# Patient Record
Sex: FEMALE | Race: WHITE | NOT HISPANIC OR LATINO | Employment: OTHER | URBAN - METROPOLITAN AREA
[De-identification: names, ages, dates, MRNs, and addresses within clinical notes are randomized per-mention and may not be internally consistent; named-entity substitution may affect disease eponyms.]

---

## 2023-03-07 DIAGNOSIS — F41.9 ANXIETY: Primary | ICD-10-CM

## 2023-03-07 RX ORDER — ALPRAZOLAM 0.5 MG/1
0.5 TABLET, EXTENDED RELEASE ORAL DAILY
Qty: 90 TABLET | Refills: 0 | Status: SHIPPED | OUTPATIENT
Start: 2023-03-07 | End: 2023-05-30 | Stop reason: SDUPTHER

## 2023-03-07 RX ORDER — ALPRAZOLAM 0.5 MG/1
1 TABLET, EXTENDED RELEASE ORAL DAILY
COMMUNITY
Start: 2013-03-21 | End: 2023-03-07 | Stop reason: SDUPTHER

## 2023-03-15 DIAGNOSIS — I10 BENIGN ESSENTIAL HYPERTENSION: ICD-10-CM

## 2023-03-15 DIAGNOSIS — E78.00 HYPERCHOLESTEROLEMIA: ICD-10-CM

## 2023-03-16 PROBLEM — E66.01 MORBID OBESITY (MULTI): Status: ACTIVE | Noted: 2023-03-16

## 2023-03-16 PROBLEM — R09.82 POST-NASAL DRIP: Status: ACTIVE | Noted: 2023-03-16

## 2023-03-16 PROBLEM — R07.9 CHEST PAIN: Status: ACTIVE | Noted: 2023-03-16

## 2023-03-16 PROBLEM — J45.40 MODERATE PERSISTENT ASTHMA WITHOUT COMPLICATION (HHS-HCC): Status: ACTIVE | Noted: 2023-03-16

## 2023-03-16 PROBLEM — M17.10 ARTHRITIS OF KNEE: Status: ACTIVE | Noted: 2023-03-16

## 2023-03-16 PROBLEM — Z96.611 STATUS POST REPLACEMENT OF RIGHT SHOULDER JOINT: Status: ACTIVE | Noted: 2023-03-16

## 2023-03-16 PROBLEM — R13.10 DYSPHAGIA: Status: ACTIVE | Noted: 2023-03-16

## 2023-03-16 PROBLEM — R73.09 ABNORMAL GLUCOSE: Status: ACTIVE | Noted: 2023-03-16

## 2023-03-16 PROBLEM — J31.0 RHINITIS, CHRONIC: Status: ACTIVE | Noted: 2023-03-16

## 2023-03-16 PROBLEM — K21.9 GERD (GASTROESOPHAGEAL REFLUX DISEASE): Status: ACTIVE | Noted: 2023-03-16

## 2023-03-16 PROBLEM — R53.1 WEAKNESS GENERALIZED: Status: ACTIVE | Noted: 2023-03-16

## 2023-03-16 PROBLEM — M17.11 LOCALIZED PRIMARY OSTEOARTHRITIS OF LOWER LEG, RIGHT: Status: ACTIVE | Noted: 2023-03-16

## 2023-03-16 PROBLEM — M54.12 CERVICAL RADICULAR PAIN: Status: ACTIVE | Noted: 2023-03-16

## 2023-03-16 PROBLEM — N90.4 LICHEN SCLEROSUS OF FEMALE GENITALIA: Status: ACTIVE | Noted: 2023-03-16

## 2023-03-16 PROBLEM — R06.00 DYSPNEA: Status: ACTIVE | Noted: 2023-03-16

## 2023-03-16 PROBLEM — K58.0 IRRITABLE BOWEL SYNDROME WITH DIARRHEA: Status: ACTIVE | Noted: 2023-03-16

## 2023-03-16 PROBLEM — J38.3 VOCAL CORD DYSFUNCTION: Status: ACTIVE | Noted: 2023-03-16

## 2023-03-16 PROBLEM — R05.3 COUGH, PERSISTENT: Status: ACTIVE | Noted: 2023-03-16

## 2023-03-16 PROBLEM — S43.429A ROTATOR CUFF (CAPSULE) SPRAIN: Status: ACTIVE | Noted: 2023-03-16

## 2023-03-16 PROBLEM — L02.01 ABSCESS OF SUBMENTAL REGION: Status: ACTIVE | Noted: 2023-03-16

## 2023-03-16 PROBLEM — G56.00 SEVERE CARPAL TUNNEL SYNDROME: Status: ACTIVE | Noted: 2023-03-16

## 2023-03-16 PROBLEM — J06.9 ACUTE URI: Status: ACTIVE | Noted: 2023-03-16

## 2023-03-16 PROBLEM — M47.812 SPONDYLOSIS OF CERVICAL REGION WITHOUT MYELOPATHY OR RADICULOPATHY: Status: ACTIVE | Noted: 2023-03-16

## 2023-03-16 PROBLEM — J95.04 TRACHEOCUTANEOUS FISTULA FOLLOWING TRACHEOSTOMY (MULTI): Status: ACTIVE | Noted: 2023-03-16

## 2023-03-16 PROBLEM — M16.12 ARTHRITIS OF LEFT HIP: Status: ACTIVE | Noted: 2023-03-16

## 2023-03-16 PROBLEM — M25.519 SHOULDER PAIN: Status: ACTIVE | Noted: 2023-03-16

## 2023-03-16 PROBLEM — R05.8 UPPER AIRWAY COUGH SYNDROME: Status: ACTIVE | Noted: 2023-03-16

## 2023-03-16 PROBLEM — J45.909 ASTHMA (HHS-HCC): Status: ACTIVE | Noted: 2023-03-16

## 2023-03-16 PROBLEM — M48.061 SPINAL STENOSIS OF LUMBAR REGION: Status: ACTIVE | Noted: 2023-03-16

## 2023-03-16 PROBLEM — M79.7 FIBROMYALGIA: Status: ACTIVE | Noted: 2023-03-16

## 2023-03-16 PROBLEM — E78.00 HYPERCHOLESTEROLEMIA: Status: ACTIVE | Noted: 2023-03-16

## 2023-03-16 PROBLEM — M62.81 MUSCLE WEAKNESS (GENERALIZED): Status: ACTIVE | Noted: 2023-03-16

## 2023-03-16 PROBLEM — Z93.0 STATUS POST TRACHEOSTOMY (MULTI): Status: ACTIVE | Noted: 2023-03-16

## 2023-03-16 PROBLEM — N95.2 VAGINAL ATROPHY: Status: ACTIVE | Noted: 2023-03-16

## 2023-03-16 PROBLEM — M25.559 ARTHRALGIA OF HIP: Status: ACTIVE | Noted: 2023-03-16

## 2023-03-16 PROBLEM — E03.9 HYPOTHYROIDISM: Status: ACTIVE | Noted: 2023-03-16

## 2023-03-16 PROBLEM — T78.3XXA ANGIOEDEMA: Status: ACTIVE | Noted: 2023-03-16

## 2023-03-16 PROBLEM — M25.569 JOINT PAIN, KNEE: Status: ACTIVE | Noted: 2023-03-16

## 2023-03-16 PROBLEM — R19.7 DIARRHEA: Status: ACTIVE | Noted: 2023-03-16

## 2023-03-16 PROBLEM — K25.9: Status: ACTIVE | Noted: 2023-03-16

## 2023-03-16 PROBLEM — R45.89 DEPRESSED MOOD: Status: ACTIVE | Noted: 2023-03-16

## 2023-03-16 PROBLEM — N90.89 VULVAR LESION: Status: ACTIVE | Noted: 2023-03-16

## 2023-03-16 PROBLEM — R06.1 STRIDOR: Status: ACTIVE | Noted: 2023-03-16

## 2023-03-16 PROBLEM — F41.9 ANXIETY: Status: ACTIVE | Noted: 2023-03-16

## 2023-03-16 PROBLEM — B35.6 TINEA CRURIS: Status: ACTIVE | Noted: 2023-03-16

## 2023-03-16 PROBLEM — E73.9 LACTOSE INTOLERANCE: Status: ACTIVE | Noted: 2023-03-16

## 2023-03-16 PROBLEM — J32.9 CHRONIC SINUSITIS: Status: ACTIVE | Noted: 2023-03-16

## 2023-03-16 PROBLEM — M47.816 SPONDYLOSIS OF LUMBAR JOINT: Status: ACTIVE | Noted: 2023-03-16

## 2023-03-16 PROBLEM — M51.36 DISC DEGENERATION, LUMBAR: Status: ACTIVE | Noted: 2023-03-16

## 2023-03-16 PROBLEM — R49.8 VOCAL FATIGUE: Status: ACTIVE | Noted: 2023-03-16

## 2023-03-16 PROBLEM — F32.5 MAJOR DEPRESSIVE DISORDER IN FULL REMISSION (CMS-HCC): Status: ACTIVE | Noted: 2023-03-16

## 2023-03-16 PROBLEM — R20.2 HAND PARESTHESIA: Status: ACTIVE | Noted: 2023-03-16

## 2023-03-16 PROBLEM — J30.2 SEASONAL ALLERGIC RHINITIS: Status: ACTIVE | Noted: 2023-03-16

## 2023-03-16 PROBLEM — D12.6 ADENOMATOUS POLYP OF COLON: Status: ACTIVE | Noted: 2023-03-16

## 2023-03-16 PROBLEM — M51.369 DISC DEGENERATION, LUMBAR: Status: ACTIVE | Noted: 2023-03-16

## 2023-03-16 PROBLEM — M70.61 GREATER TROCHANTERIC BURSITIS OF RIGHT HIP: Status: ACTIVE | Noted: 2023-03-16

## 2023-03-16 PROBLEM — G47.33 OBSTRUCTIVE SLEEP APNEA: Status: ACTIVE | Noted: 2023-03-16

## 2023-03-16 PROBLEM — U07.1 COVID-19 VIRUS INFECTION: Status: ACTIVE | Noted: 2023-03-16

## 2023-03-16 PROBLEM — M19.019 SHOULDER ARTHRITIS: Status: ACTIVE | Noted: 2023-03-16

## 2023-03-16 PROBLEM — M48.00 SPINAL STENOSIS: Status: ACTIVE | Noted: 2023-03-16

## 2023-03-16 PROBLEM — M21.969 ANKLE AND FOOT DEFORMITY, ACQUIRED: Status: ACTIVE | Noted: 2023-03-16

## 2023-03-16 PROBLEM — I10 BENIGN ESSENTIAL HYPERTENSION: Status: ACTIVE | Noted: 2023-03-16

## 2023-03-16 PROBLEM — M16.11 OSTEOARTHRITIS OF RIGHT HIP: Status: ACTIVE | Noted: 2023-03-16

## 2023-03-16 PROBLEM — M54.2 NECK PAIN: Status: ACTIVE | Noted: 2023-03-16

## 2023-03-17 RX ORDER — LEVOTHYROXINE SODIUM 150 UG/1
TABLET ORAL
Qty: 90 TABLET | Refills: 3 | Status: SHIPPED | OUTPATIENT
Start: 2023-03-17 | End: 2023-05-30 | Stop reason: SDUPTHER

## 2023-03-17 RX ORDER — LOSARTAN POTASSIUM 100 MG/1
TABLET ORAL
Qty: 90 TABLET | Refills: 3 | Status: SHIPPED | OUTPATIENT
Start: 2023-03-17 | End: 2023-05-30 | Stop reason: SDUPTHER

## 2023-03-20 DIAGNOSIS — R45.89 DEPRESSED MOOD: ICD-10-CM

## 2023-03-20 DIAGNOSIS — I10 BENIGN ESSENTIAL HYPERTENSION: ICD-10-CM

## 2023-03-20 RX ORDER — CITALOPRAM 40 MG/1
TABLET, FILM COATED ORAL
COMMUNITY
Start: 2012-02-09 | End: 2023-03-20 | Stop reason: SDUPTHER

## 2023-03-20 RX ORDER — CITALOPRAM 40 MG/1
40 TABLET, FILM COATED ORAL
Qty: 90 TABLET | Refills: 3 | Status: SHIPPED | OUTPATIENT
Start: 2023-03-20 | End: 2024-02-05

## 2023-03-20 RX ORDER — FUROSEMIDE 20 MG/1
20 TABLET ORAL DAILY PRN
Qty: 90 TABLET | Refills: 3 | Status: SHIPPED | OUTPATIENT
Start: 2023-03-20 | End: 2023-05-30 | Stop reason: SDUPTHER

## 2023-03-20 RX ORDER — FUROSEMIDE 20 MG/1
1 TABLET ORAL DAILY PRN
COMMUNITY
Start: 2014-12-15 | End: 2023-03-20 | Stop reason: SDUPTHER

## 2023-05-30 ENCOUNTER — OFFICE VISIT (OUTPATIENT)
Dept: PRIMARY CARE | Facility: CLINIC | Age: 73
End: 2023-05-30
Payer: MEDICARE

## 2023-05-30 ENCOUNTER — LAB (OUTPATIENT)
Dept: LAB | Facility: LAB | Age: 73
End: 2023-05-30
Payer: MEDICARE

## 2023-05-30 VITALS
DIASTOLIC BLOOD PRESSURE: 60 MMHG | SYSTOLIC BLOOD PRESSURE: 100 MMHG | BODY MASS INDEX: 34.82 KG/M2 | HEIGHT: 63 IN | WEIGHT: 196.5 LBS

## 2023-05-30 DIAGNOSIS — E78.00 HYPERCHOLESTEROLEMIA: ICD-10-CM

## 2023-05-30 DIAGNOSIS — K21.9 GASTROESOPHAGEAL REFLUX DISEASE, UNSPECIFIED WHETHER ESOPHAGITIS PRESENT: ICD-10-CM

## 2023-05-30 DIAGNOSIS — M51.36 DISC DEGENERATION, LUMBAR: ICD-10-CM

## 2023-05-30 DIAGNOSIS — J45.40 MODERATE PERSISTENT ASTHMA WITHOUT COMPLICATION (HHS-HCC): ICD-10-CM
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
## 2023-05-30 DIAGNOSIS — F41.9 ANXIETY: ICD-10-CM

## 2023-05-30 DIAGNOSIS — I10 BENIGN ESSENTIAL HYPERTENSION: ICD-10-CM

## 2023-05-30 DIAGNOSIS — E03.9 HYPOTHYROIDISM, UNSPECIFIED TYPE: ICD-10-CM

## 2023-05-30 LAB
ALANINE AMINOTRANSFERASE (SGPT) (U/L) IN SER/PLAS: 18 U/L (ref 7–45)
ALBUMIN (G/DL) IN SER/PLAS: 4.7 G/DL (ref 3.4–5)
ALKALINE PHOSPHATASE (U/L) IN SER/PLAS: 74 U/L (ref 33–136)
ANION GAP IN SER/PLAS: 13 MMOL/L (ref 10–20)
ASPARTATE AMINOTRANSFERASE (SGOT) (U/L) IN SER/PLAS: 24 U/L (ref 9–39)
BASOPHILS (10*3/UL) IN BLOOD BY AUTOMATED COUNT: 0.05 X10E9/L (ref 0–0.1)
BASOPHILS/100 LEUKOCYTES IN BLOOD BY AUTOMATED COUNT: 1 % (ref 0–2)
BILIRUBIN TOTAL (MG/DL) IN SER/PLAS: 0.6 MG/DL (ref 0–1.2)
CALCIUM (MG/DL) IN SER/PLAS: 10.1 MG/DL (ref 8.6–10.6)
CARBON DIOXIDE, TOTAL (MMOL/L) IN SER/PLAS: 30 MMOL/L (ref 21–32)
CHLORIDE (MMOL/L) IN SER/PLAS: 99 MMOL/L (ref 98–107)
CHOLESTEROL (MG/DL) IN SER/PLAS: 209 MG/DL (ref 0–199)
CHOLESTEROL IN HDL (MG/DL) IN SER/PLAS: 86.5 MG/DL
CHOLESTEROL/HDL RATIO: 2.4
CREATININE (MG/DL) IN SER/PLAS: 1 MG/DL (ref 0.5–1.05)
EOSINOPHILS (10*3/UL) IN BLOOD BY AUTOMATED COUNT: 0.09 X10E9/L (ref 0–0.4)
EOSINOPHILS/100 LEUKOCYTES IN BLOOD BY AUTOMATED COUNT: 1.9 % (ref 0–6)
ERYTHROCYTE DISTRIBUTION WIDTH (RATIO) BY AUTOMATED COUNT: 12.9 % (ref 11.5–14.5)
ERYTHROCYTE MEAN CORPUSCULAR HEMOGLOBIN CONCENTRATION (G/DL) BY AUTOMATED: 33.4 G/DL (ref 32–36)
ERYTHROCYTE MEAN CORPUSCULAR VOLUME (FL) BY AUTOMATED COUNT: 89 FL (ref 80–100)
ERYTHROCYTES (10*6/UL) IN BLOOD BY AUTOMATED COUNT: 4.1 X10E12/L (ref 4–5.2)
GFR FEMALE: 59 ML/MIN/1.73M2
GLUCOSE (MG/DL) IN SER/PLAS: 101 MG/DL (ref 74–99)
HEMATOCRIT (%) IN BLOOD BY AUTOMATED COUNT: 36.5 % (ref 36–46)
HEMOGLOBIN (G/DL) IN BLOOD: 12.2 G/DL (ref 12–16)
IMMATURE GRANULOCYTES/100 LEUKOCYTES IN BLOOD BY AUTOMATED COUNT: 0.4 % (ref 0–0.9)
LDL: 113 MG/DL (ref 0–99)
LEUKOCYTES (10*3/UL) IN BLOOD BY AUTOMATED COUNT: 4.8 X10E9/L (ref 4.4–11.3)
LYMPHOCYTES (10*3/UL) IN BLOOD BY AUTOMATED COUNT: 1.44 X10E9/L (ref 0.8–3)
LYMPHOCYTES/100 LEUKOCYTES IN BLOOD BY AUTOMATED COUNT: 29.8 % (ref 13–44)
MONOCYTES (10*3/UL) IN BLOOD BY AUTOMATED COUNT: 0.37 X10E9/L (ref 0.05–0.8)
MONOCYTES/100 LEUKOCYTES IN BLOOD BY AUTOMATED COUNT: 7.7 % (ref 2–10)
NEUTROPHILS (10*3/UL) IN BLOOD BY AUTOMATED COUNT: 2.86 X10E9/L (ref 1.6–5.5)
NEUTROPHILS/100 LEUKOCYTES IN BLOOD BY AUTOMATED COUNT: 59.2 % (ref 40–80)
NRBC (PER 100 WBCS) BY AUTOMATED COUNT: 0 /100 WBC (ref 0–0)
PLATELETS (10*3/UL) IN BLOOD AUTOMATED COUNT: 221 X10E9/L (ref 150–450)
POTASSIUM (MMOL/L) IN SER/PLAS: 4.5 MMOL/L (ref 3.5–5.3)
PROTEIN TOTAL: 6.8 G/DL (ref 6.4–8.2)
SODIUM (MMOL/L) IN SER/PLAS: 137 MMOL/L (ref 136–145)
THYROTROPIN (MIU/L) IN SER/PLAS BY DETECTION LIMIT <= 0.05 MIU/L: 1.11 MIU/L (ref 0.44–3.98)
TRIGLYCERIDE (MG/DL) IN SER/PLAS: 49 MG/DL (ref 0–149)
UREA NITROGEN (MG/DL) IN SER/PLAS: 23 MG/DL (ref 6–23)
VLDL: 10 MG/DL (ref 0–40)

## 2023-05-30 PROCEDURE — 85025 COMPLETE CBC W/AUTO DIFF WBC: CPT

## 2023-05-30 PROCEDURE — 80053 COMPREHEN METABOLIC PANEL: CPT

## 2023-05-30 PROCEDURE — 84443 ASSAY THYROID STIM HORMONE: CPT

## 2023-05-30 PROCEDURE — 80061 LIPID PANEL: CPT

## 2023-05-30 PROCEDURE — 3078F DIAST BP <80 MM HG: CPT | Performed by: INTERNAL MEDICINE

## 2023-05-30 PROCEDURE — 3074F SYST BP LT 130 MM HG: CPT | Performed by: INTERNAL MEDICINE

## 2023-05-30 PROCEDURE — 99214 OFFICE O/P EST MOD 30 MIN: CPT | Performed by: INTERNAL MEDICINE

## 2023-05-30 PROCEDURE — 36415 COLL VENOUS BLD VENIPUNCTURE: CPT

## 2023-05-30 RX ORDER — FLUTICASONE PROPIONATE 50 MCG
2 SPRAY, SUSPENSION (ML) NASAL DAILY
COMMUNITY
Start: 2018-05-08 | End: 2023-05-30 | Stop reason: SDUPTHER

## 2023-05-30 RX ORDER — LEVOTHYROXINE SODIUM 150 UG/1
150 TABLET ORAL DAILY
Qty: 90 TABLET | Refills: 3 | Status: SHIPPED | OUTPATIENT
Start: 2023-05-30 | End: 2024-05-29

## 2023-05-30 RX ORDER — FLUTICASONE PROPIONATE AND SALMETEROL 250; 50 UG/1; UG/1
1 POWDER RESPIRATORY (INHALATION) EVERY 12 HOURS
COMMUNITY
Start: 2021-10-06 | End: 2023-05-30 | Stop reason: SDUPTHER

## 2023-05-30 RX ORDER — FLUTICASONE PROPIONATE 50 MCG
2 SPRAY, SUSPENSION (ML) NASAL DAILY
Qty: 48 G | Refills: 3 | Status: SHIPPED | OUTPATIENT
Start: 2023-05-30 | End: 2024-05-29

## 2023-05-30 RX ORDER — SIMETHICONE 80 MG
TABLET,CHEWABLE ORAL
COMMUNITY

## 2023-05-30 RX ORDER — GLUCOSAMINE/CHONDROITIN/C/MANG 500-400 MG
1500 CAPSULE ORAL
COMMUNITY

## 2023-05-30 RX ORDER — MONTELUKAST SODIUM 10 MG/1
10 TABLET ORAL DAILY
COMMUNITY
Start: 2013-03-28 | End: 2024-02-05

## 2023-05-30 RX ORDER — MULTIVIT-MIN/IRON FUM/FOLIC AC 7.5 MG-4
1 TABLET ORAL DAILY
COMMUNITY
Start: 2014-09-30

## 2023-05-30 RX ORDER — KETOCONAZOLE 20 MG/G
CREAM TOPICAL 2 TIMES DAILY
COMMUNITY
Start: 2020-09-04

## 2023-05-30 RX ORDER — OMEPRAZOLE 40 MG/1
40 CAPSULE, DELAYED RELEASE ORAL 2 TIMES DAILY
Qty: 180 CAPSULE | Refills: 3 | Status: SHIPPED | OUTPATIENT
Start: 2023-05-30 | End: 2024-05-29

## 2023-05-30 RX ORDER — ALPRAZOLAM 0.5 MG/1
0.5 TABLET, EXTENDED RELEASE ORAL DAILY
Qty: 90 TABLET | Refills: 0 | Status: SHIPPED | OUTPATIENT
Start: 2023-05-30 | End: 2023-06-05

## 2023-05-30 RX ORDER — ALBUTEROL SULFATE 90 UG/1
2 AEROSOL, METERED RESPIRATORY (INHALATION) EVERY 6 HOURS PRN
COMMUNITY
Start: 2018-05-03 | End: 2023-11-07

## 2023-05-30 RX ORDER — ACETAMINOPHEN 500 MG
TABLET ORAL
COMMUNITY

## 2023-05-30 RX ORDER — ALBUTEROL SULFATE 0.83 MG/ML
2.5 SOLUTION RESPIRATORY (INHALATION) EVERY 8 HOURS PRN
COMMUNITY
Start: 2014-05-05

## 2023-05-30 RX ORDER — GABAPENTIN 300 MG/1
900 CAPSULE ORAL 3 TIMES DAILY
COMMUNITY
Start: 2012-11-20 | End: 2023-05-30 | Stop reason: SDUPTHER

## 2023-05-30 RX ORDER — NAPROXEN 500 MG/1
500 TABLET ORAL
COMMUNITY
Start: 2012-05-01 | End: 2024-01-24

## 2023-05-30 RX ORDER — CETIRIZINE HYDROCHLORIDE 10 MG/1
1 TABLET ORAL DAILY
COMMUNITY

## 2023-05-30 RX ORDER — CLOBETASOL PROPIONATE 0.5 MG/G
CREAM TOPICAL
COMMUNITY

## 2023-05-30 RX ORDER — ZINC SULFATE 50(220)MG
1 CAPSULE ORAL DAILY
COMMUNITY

## 2023-05-30 RX ORDER — NYSTATIN 100000 [USP'U]/G
POWDER TOPICAL
COMMUNITY
Start: 2019-12-09

## 2023-05-30 RX ORDER — CHOLECALCIFEROL (VITAMIN D3) 125 MCG
CAPSULE ORAL
COMMUNITY

## 2023-05-30 RX ORDER — GABAPENTIN 300 MG/1
900 CAPSULE ORAL 3 TIMES DAILY
Qty: 810 CAPSULE | Refills: 0 | Status: SHIPPED | OUTPATIENT
Start: 2023-05-30 | End: 2023-09-08

## 2023-05-30 RX ORDER — OMEPRAZOLE 40 MG/1
1 CAPSULE, DELAYED RELEASE ORAL 2 TIMES DAILY
COMMUNITY
Start: 2021-09-13 | End: 2023-05-30 | Stop reason: SDUPTHER

## 2023-05-30 RX ORDER — FLUTICASONE PROPIONATE AND SALMETEROL 250; 50 UG/1; UG/1
1 POWDER RESPIRATORY (INHALATION) EVERY 12 HOURS
Qty: 180 EACH | Refills: 3 | Status: SHIPPED | OUTPATIENT
Start: 2023-05-30 | End: 2024-05-29

## 2023-05-30 RX ORDER — PRAVASTATIN SODIUM 40 MG/1
40 TABLET ORAL DAILY
COMMUNITY
Start: 2012-01-10 | End: 2024-02-05

## 2023-05-30 RX ORDER — FUROSEMIDE 20 MG/1
20 TABLET ORAL DAILY PRN
Qty: 90 TABLET | Refills: 3 | Status: SHIPPED | OUTPATIENT
Start: 2023-05-30 | End: 2024-05-29

## 2023-05-30 RX ORDER — ESTRADIOL 0.1 MG/G
CREAM VAGINAL
COMMUNITY
Start: 2011-08-17

## 2023-05-30 RX ORDER — LOSARTAN POTASSIUM 50 MG/1
50 TABLET ORAL DAILY
Qty: 90 TABLET | Refills: 3 | Status: SHIPPED | OUTPATIENT
Start: 2023-05-30 | End: 2024-04-25

## 2023-05-30 RX ORDER — ACETAMINOPHEN 500 MG
1 TABLET ORAL 2 TIMES DAILY
COMMUNITY

## 2023-05-30 RX ORDER — TRIAMTERENE/HYDROCHLOROTHIAZID 37.5-25 MG
1 TABLET ORAL DAILY
COMMUNITY
Start: 2012-06-13 | End: 2024-02-05

## 2023-05-30 ASSESSMENT — ENCOUNTER SYMPTOMS
DEPRESSION: 0
LOSS OF SENSATION IN FEET: 1
OCCASIONAL FEELINGS OF UNSTEADINESS: 1
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
## 2023-05-31 NOTE — RESULT ENCOUNTER NOTE
Dear Patient,    GOOD NEWS    Attached to this note is a copy of the testing that I have ordered.The results indicate that there are no significant abnormalities in your results, even if some of the findings are reported as abnormal.    Please continue your current treatment plan as we have discussed and return for follow up as planned.    Do not hesitate to contact me if you have any questions or concerns.    Sincerely,  Skip Sal M.D.

## 2023-06-03 DIAGNOSIS — F41.9 ANXIETY: ICD-10-CM

## 2023-06-05 RX ORDER — ALPRAZOLAM 0.5 MG/1
0.5 TABLET, EXTENDED RELEASE ORAL DAILY
Qty: 90 TABLET | Refills: 0 | Status: SHIPPED | OUTPATIENT
Start: 2023-06-05 | End: 2023-12-11 | Stop reason: SDUPTHER

## 2023-07-12 ENCOUNTER — DOCUMENTATION (OUTPATIENT)
Dept: PRIMARY CARE | Facility: CLINIC | Age: 73
End: 2023-07-12
Payer: MEDICARE

## 2023-07-12 NOTE — PROGRESS NOTES
PT indicates that Pes planus and foot pain have not resolved despite the use of OTC orthotics. Will fax handwritten Rx to PT for custom foot orthotics.

## 2023-09-06 DIAGNOSIS — M51.36 DISC DEGENERATION, LUMBAR: ICD-10-CM

## 2023-09-08 RX ORDER — GABAPENTIN 300 MG/1
900 CAPSULE ORAL 3 TIMES DAILY
Qty: 810 CAPSULE | Refills: 3 | Status: SHIPPED | OUTPATIENT
Start: 2023-09-08 | End: 2024-05-28 | Stop reason: DRUGHIGH

## 2023-10-11 PROBLEM — Z87.891 PERSONAL HISTORY OF NICOTINE DEPENDENCE: Status: ACTIVE | Noted: 2022-05-02

## 2023-10-11 PROBLEM — L30.4 ERYTHEMA INTERTRIGO: Status: ACTIVE | Noted: 2023-01-31

## 2023-10-11 PROBLEM — D18.01 HEMANGIOMA OF SKIN AND SUBCUTANEOUS TISSUE: Status: ACTIVE | Noted: 2023-01-31

## 2023-10-11 PROBLEM — D48.5 NEOPLASM OF UNCERTAIN BEHAVIOR OF SKIN: Status: ACTIVE | Noted: 2023-01-31

## 2023-10-11 PROBLEM — G47.33 OBSTRUCTIVE SLEEP APNEA (ADULT) (PEDIATRIC): Status: ACTIVE | Noted: 2022-05-02

## 2023-10-11 PROBLEM — D22.70 MELANOCYTIC NEVI OF UNSPECIFIED LOWER LIMB, INCLUDING HIP: Status: ACTIVE | Noted: 2023-01-31

## 2023-10-11 PROBLEM — M41.9 SCOLIOSIS: Status: ACTIVE | Noted: 2022-05-02

## 2023-10-11 PROBLEM — L82.0 INFLAMED SEBORRHEIC KERATOSIS: Status: ACTIVE | Noted: 2023-01-31

## 2023-10-11 PROBLEM — D22.60 MELANOCYTIC NEVI OF UNSPECIFIED UPPER LIMB, INCLUDING SHOULDER: Status: ACTIVE | Noted: 2023-01-31

## 2023-10-11 PROBLEM — H52.202 ASTIGMATISM, LEFT: Status: ACTIVE | Noted: 2017-08-24

## 2023-10-11 PROBLEM — Z96.642 PRESENCE OF LEFT ARTIFICIAL HIP JOINT: Status: ACTIVE | Noted: 2022-05-02

## 2023-10-11 PROBLEM — R29.898 HIP WEAKNESS: Status: ACTIVE | Noted: 2023-10-11

## 2023-10-11 PROBLEM — L57.0 ACTINIC KERATOSIS: Status: ACTIVE | Noted: 2023-01-31

## 2023-10-11 PROBLEM — H52.201 ASTIGMATISM OF RIGHT EYE: Status: ACTIVE | Noted: 2017-08-24

## 2023-10-11 PROBLEM — M47.816 SPONDYLOSIS WITHOUT MYELOPATHY OR RADICULOPATHY, LUMBAR REGION: Status: ACTIVE | Noted: 2022-05-02

## 2023-10-11 PROBLEM — E11.9 TYPE 2 DIABETES MELLITUS WITHOUT COMPLICATIONS (MULTI): Status: ACTIVE | Noted: 2022-05-02

## 2023-10-11 PROBLEM — M51.369 OTHER INTERVERTEBRAL DISC DEGENERATION, LUMBAR REGION: Status: ACTIVE | Noted: 2022-05-02

## 2023-10-11 PROBLEM — M47.22 OTHER SPONDYLOSIS WITH RADICULOPATHY, CERVICAL REGION: Status: ACTIVE | Noted: 2022-05-02

## 2023-10-11 PROBLEM — D22.5 MELANOCYTIC NEVI OF TRUNK: Status: ACTIVE | Noted: 2023-01-31

## 2023-10-11 PROBLEM — R26.89 IMBALANCE: Status: ACTIVE | Noted: 2023-10-11

## 2023-10-11 PROBLEM — M21.969: Status: ACTIVE | Noted: 2022-05-02

## 2023-10-11 PROBLEM — L57.9 SKIN CHANGES DUE TO CHRONIC EXPOSURE TO NONIONIZING RADIATION, UNSPECIFIED: Status: ACTIVE | Noted: 2023-01-31

## 2023-10-11 PROBLEM — E66.01 MORBID (SEVERE) OBESITY DUE TO EXCESS CALORIES (MULTI): Status: ACTIVE | Noted: 2022-05-02

## 2023-10-11 PROBLEM — D22.39 MELANOCYTIC NEVI OF OTHER PARTS OF FACE: Status: ACTIVE | Noted: 2023-01-31

## 2023-10-11 PROBLEM — F32.5 MAJOR DEPRESSIVE DISORDER, SINGLE EPISODE IN FULL REMISSION (CMS-HCC): Status: ACTIVE | Noted: 2022-05-02

## 2023-10-11 PROBLEM — Z79.82 LONG TERM (CURRENT) USE OF ASPIRIN: Status: ACTIVE | Noted: 2022-05-02

## 2023-10-11 PROBLEM — D22.4 MELANOCYTIC NEVI OF SCALP AND NECK: Status: ACTIVE | Noted: 2023-01-31

## 2023-10-11 PROBLEM — M51.36 OTHER INTERVERTEBRAL DISC DEGENERATION, LUMBAR REGION: Status: ACTIVE | Noted: 2022-05-02
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
## 2023-10-11 PROBLEM — Z96.653 ARTIFICIAL KNEE JOINT PRESENT, BILATERAL: Status: ACTIVE | Noted: 2022-05-02

## 2023-10-11 PROBLEM — F43.10 POST-TRAUMATIC STRESS DISORDER, UNSPECIFIED: Status: ACTIVE | Noted: 2022-05-02

## 2023-10-11 RX ORDER — TALC
POWDER (GRAM) TOPICAL
COMMUNITY
Start: 2022-05-05

## 2023-10-11 RX ORDER — FLUTICASONE PROPIONATE 110 UG/1
AEROSOL, METERED RESPIRATORY (INHALATION)
COMMUNITY
Start: 2017-11-08

## 2023-10-11 RX ORDER — OMEPRAZOLE 20 MG/1
CAPSULE, DELAYED RELEASE ORAL
COMMUNITY
Start: 2011-06-10

## 2023-10-11 RX ORDER — ASPIRIN 81 MG/1
TABLET ORAL
COMMUNITY
Start: 2022-05-03

## 2023-10-11 RX ORDER — VITAMIN E MIXED 400 UNIT
CAPSULE ORAL
COMMUNITY
Start: 2003-05-29

## 2023-10-11 RX ORDER — GUAIFENESIN 1200 MG/1
1 TABLET, EXTENDED RELEASE ORAL EVERY 12 HOURS
COMMUNITY

## 2023-10-11 RX ORDER — NAPROXEN SODIUM 220 MG/1
1 TABLET ORAL DAILY
COMMUNITY

## 2023-10-11 RX ORDER — GLUCOSAMINE/CHONDR SU A SOD 750-600 MG
TABLET ORAL
COMMUNITY
Start: 2022-05-04

## 2023-10-11 RX ORDER — TRAMADOL HYDROCHLORIDE 50 MG/1
TABLET ORAL
COMMUNITY
Start: 2022-05-03 | End: 2024-03-18 | Stop reason: ALTCHOICE

## 2023-10-11 RX ORDER — LOSARTAN POTASSIUM 100 MG/1
1 TABLET ORAL DAILY
COMMUNITY
Start: 2022-02-13

## 2023-10-12 ENCOUNTER — TREATMENT (OUTPATIENT)
Dept: PHYSICAL THERAPY | Facility: CLINIC | Age: 73
End: 2023-10-12
Payer: MEDICARE

## 2023-10-12 DIAGNOSIS — R29.898 WEAKNESS OF BOTH HIPS: ICD-10-CM

## 2023-10-12 DIAGNOSIS — R26.89 IMBALANCE: Primary | ICD-10-CM

## 2023-10-12 PROCEDURE — 97110 THERAPEUTIC EXERCISES: CPT | Mod: GP

## 2023-10-12 PROCEDURE — 97112 NEUROMUSCULAR REEDUCATION: CPT | Mod: GP

## 2023-10-12 ASSESSMENT — ENCOUNTER SYMPTOMS
DEPRESSION: 0
OCCASIONAL FEELINGS OF UNSTEADINESS: 1
LOSS OF SENSATION IN FEET: 0

## 2023-10-12 NOTE — PROGRESS NOTES
Physical Therapy Treatment    Patient Name: Mary See  MRN: [id number]  Today's Date: 10/12/2023  Visit: 13/MN     SUBJECTIVE:  Patient was carrying a heavy bag while stepping down a step into living room.  Slowly slid down the wall until was sitting.  No injury.  Needed chair to pull up on to rise from floor.  Feels fibromyalgia is flared up lately.    OBJECTIVE:  Trendelenberg gait    TREATMENT:  Therex:  Scifit (LEs only) x 6  min  Gastroc and soleus stretch on slantboard x 1 min  .Multihip:  -plate 1 hip abduction L, plate 2 on R x 20 reps  -plate 2 hip flexion and extension x 30 reps  8 inch step ups x 10 reps bilat  6 inch hip hikes x 20 reps bilat    Neuromuscular Re-education:   Balance Training:  -rockerboard- AP and LR with UE assist- dynamic and static x 1 min each  -AP on 1/2 foam x 1 min  -AP toe raises and heel raises on 1/2 foam x 30 reps  -full tandem x 30 sec bilat    ASSESSMENT:   Patient appropriately challenged with exercises.  Fatigue in hips with hip hikes as well as increased challenge on 8 inch vs 6 inch step ups.  More difficulty with tandem with R foot in back.    PLAN:    Likely DC next visit.

## 2023-10-26 ENCOUNTER — APPOINTMENT (OUTPATIENT)
Dept: PHYSICAL THERAPY | Facility: CLINIC | Age: 73
End: 2023-10-26
Payer: MEDICARE

## 2023-10-26 DIAGNOSIS — Z96.653 ARTIFICIAL KNEE JOINT PRESENT, BILATERAL: Primary | ICD-10-CM

## 2023-10-31 RX ORDER — AMOXICILLIN 500 MG/1
500 TABLET, FILM COATED ORAL SEE ADMIN INSTRUCTIONS
Status: SHIPPED | OUTPATIENT
Start: 2023-10-31 | End: 2023-11-04

## 2023-11-03 DIAGNOSIS — J45.909 ASTHMA, UNSPECIFIED ASTHMA SEVERITY, UNSPECIFIED WHETHER COMPLICATED, UNSPECIFIED WHETHER PERSISTENT (HHS-HCC): Primary | ICD-10-CM

## 2023-11-07 ENCOUNTER — APPOINTMENT (OUTPATIENT)
Dept: ORTHOPEDIC SURGERY | Facility: CLINIC | Age: 73
End: 2023-11-07
Payer: MEDICARE

## 2023-11-07 RX ORDER — ALBUTEROL SULFATE 90 UG/1
AEROSOL, METERED RESPIRATORY (INHALATION)
Qty: 34 G | Refills: 3 | Status: SHIPPED | OUTPATIENT
Start: 2023-11-07

## 2023-11-09 ENCOUNTER — TREATMENT (OUTPATIENT)
Dept: PHYSICAL THERAPY | Facility: CLINIC | Age: 73
End: 2023-11-09
Payer: MEDICARE

## 2023-11-09 DIAGNOSIS — R26.89 IMBALANCE: Primary | ICD-10-CM

## 2023-11-09 DIAGNOSIS — R29.898 WEAKNESS OF BOTH HIPS: ICD-10-CM

## 2023-11-09 PROCEDURE — 97110 THERAPEUTIC EXERCISES: CPT | Mod: GP

## 2023-11-09 NOTE — PROGRESS NOTES
Physical Therapy Treatment    Patient Name: Mary See  MRN: [id number]  Today's Date: 11/9/2023  Visit: 14/MN     SUBJECTIVE:  Patient states has not fallen since last visit seen.    OBJECTIVE:  Trendelenberg gait    LE Strength (R/L):  Hip flexion=4/4  Hip abduction= 3/3+  Hip extension= able to full bridge  Knee flexion= 5/5  Knee extension= 5/5  Ankle DF= 5/5    WISDOM= 45/56    TREATMENT:  Therex:  Scifit (LEs only) x 5  min  Gastroc and soleus stretch on slantboard x 1 min  .Multihip:  -plate 2 hip abduction x 20 reps  -plate 2 hip flexion and extension x 30 reps    Reviewed HEP and updated (see in chart)  ASSESSMENT:   Patient has shown improvement in balance.  Does have some remaining imbalance which is likely due to hip weakness and foot weakness/misalignment.  Has HEP to continue to address this.    PLAN:    DC to indep HEP.

## 2023-11-13 ENCOUNTER — APPOINTMENT (OUTPATIENT)
Dept: PHYSICAL THERAPY | Facility: CLINIC | Age: 73
End: 2023-11-13
Payer: MEDICARE

## 2023-11-29 ENCOUNTER — APPOINTMENT (OUTPATIENT)
Dept: PRIMARY CARE | Facility: CLINIC | Age: 73
End: 2023-11-29
Payer: MEDICARE

## 2023-12-05 DIAGNOSIS — Z47.1 AFTERCARE FOLLOWING LEFT HIP JOINT REPLACEMENT SURGERY: Primary | ICD-10-CM

## 2023-12-05 DIAGNOSIS — Z96.642 AFTERCARE FOLLOWING LEFT HIP JOINT REPLACEMENT SURGERY: Primary | ICD-10-CM

## 2023-12-05 RX ORDER — AMOXICILLIN 500 MG/1
500 TABLET, FILM COATED ORAL SEE ADMIN INSTRUCTIONS
Status: SHIPPED | OUTPATIENT
Start: 2023-12-05 | End: 2023-12-10

## 2023-12-11 ENCOUNTER — OFFICE VISIT (OUTPATIENT)
Dept: PRIMARY CARE | Facility: CLINIC | Age: 73
End: 2023-12-11
Payer: MEDICARE

## 2023-12-11 VITALS
SYSTOLIC BLOOD PRESSURE: 110 MMHG | HEART RATE: 84 BPM | WEIGHT: 222.8 LBS | DIASTOLIC BLOOD PRESSURE: 72 MMHG | BODY MASS INDEX: 39.47 KG/M2 | TEMPERATURE: 97.6 F

## 2023-12-11 DIAGNOSIS — M19.072 PRIMARY OSTEOARTHRITIS OF BOTH FEET: Primary | ICD-10-CM

## 2023-12-11 DIAGNOSIS — M19.071 PRIMARY OSTEOARTHRITIS OF BOTH FEET: Primary | ICD-10-CM

## 2023-12-11 DIAGNOSIS — F41.9 ANXIETY: ICD-10-CM

## 2023-12-11 PROCEDURE — 1036F TOBACCO NON-USER: CPT | Performed by: INTERNAL MEDICINE

## 2023-12-11 PROCEDURE — 3078F DIAST BP <80 MM HG: CPT | Performed by: INTERNAL MEDICINE

## 2023-12-11 PROCEDURE — 99215 OFFICE O/P EST HI 40 MIN: CPT | Performed by: INTERNAL MEDICINE

## 2023-12-11 PROCEDURE — 4010F ACE/ARB THERAPY RXD/TAKEN: CPT | Performed by: INTERNAL MEDICINE

## 2023-12-11 PROCEDURE — 1126F AMNT PAIN NOTED NONE PRSNT: CPT | Performed by: INTERNAL MEDICINE

## 2023-12-11 PROCEDURE — 3074F SYST BP LT 130 MM HG: CPT | Performed by: INTERNAL MEDICINE

## 2023-12-11 RX ORDER — ALPRAZOLAM 0.5 MG/1
0.5 TABLET, EXTENDED RELEASE ORAL DAILY
Qty: 90 TABLET | Refills: 0 | Status: SHIPPED | OUTPATIENT
Start: 2023-12-11 | End: 2024-03-19 | Stop reason: SDUPTHER

## 2023-12-11 ASSESSMENT — PAIN SCALES - GENERAL: PAINLEVEL: 0-NO PAIN

## 2023-12-11 NOTE — PROGRESS NOTES
Subjective   Patient ID: Mary See is a 73 y.o. female who presents for Follow-up (Ms. See stated that she's having pain in her lower back, feet and left shoulder).  Mary has severe pain in her feet. It is NOT the pain of diabetic neuropathy. She has severe foot deformity and has been seeing podiatry. She has custom orthotics which add to her stability. The podiatrist says her pain is due to arthritis. She has had multiple X-rays which are purported to show arthritis. This impacts her ADLs. She has balance issues which are severely exacerbated by her foot pain. She has had a fall earlier this year. She is planning on spending the winter in [place].    Some of her weight has regained. Her podiatrist is Dr. Wetzel.      Current Outpatient Medications   Medication Instructions    acetaminophen (Tylenol Extra Strength) 500 mg tablet oral    albuterol 90 mcg/actuation inhaler USE 2 INHALATIONS BY MOUTH  EVERY 6 HOURS AS NEEDED FOR SHORTNESS OF BREATH    albuterol 2.5 mg, nebulization, Every 8 hours PRN    ALPRAZolam XR (XANAX XR) 0.5 mg, oral, Daily    ascorbic acid (VITAMIN C ORAL) 1 tablet, oral, Daily    ascorbic acid, vitamin C, 1,000 mg ER tablet 1 tablet, oral, Daily    aspirin (Adult Low Dose Aspirin) 81 mg EC tablet 1 tablet 2 TIMES DAILY (route: oral)    calcium carbonate-vitamin D3 600 mg-20 mcg (800 unit) tablet 1 tablet, oral, 2 times daily    carboxymethylcellulose-glycern 0.5-0.9 % drops Use 1-2 Drops in both eyes as needed.    cetirizine (ZyrTEC) 10 mg tablet 1 tablet, oral, Daily    cholecalciferol (Vitamin D-3) 125 MCG (5000 UT) capsule oral    citalopram (CELEXA) 40 mg, oral, Daily RT    clobetasol (Temovate) 0.05 % cream APPLY TO AFFECTED AREA(S) TOPICALLY ONCE DAILY FOR 1 WEEK, THEN TWICE WEEKLY FOR MAINTENANCE.    estradiol (Estrace) 0.1 MG/GM vaginal cream vaginal    fluticasone (Flonase) 50 mcg/actuation nasal spray 2 sprays, Each Nostril, Daily    fluticasone (Flovent) 110 mcg/actuation  inhaler inhalation    furosemide (LASIX) 20 mg, oral, Daily PRN    gabapentin (NEURONTIN) 900 mg, oral, 3 times daily    glucosamine-chondroit-vit C-Mn (Glucosamine Chondroitin MaxStr) 500-400 mg capsule 1,500 mg, oral    glucosamine/chondr wray A sod (glucosamine-chondroitin) 750-600 mg tablet tablet 2 tablet DAILY (route: oral)    guaiFENesin (Mucinex) 1,200 mg tablet extended release 12hr 1 tablet, oral, Every 12 hours, FOR CONGESTION    ketoconazole (NIZOral) 2 % cream Topical, 2 times daily    krill/om3/dha/epa/om6/lip/astx (KRILL OIL, OMEGA 3 AND 6, ORAL) oral    levothyroxine (SYNTHROID, LEVOXYL) 150 mcg, oral, Daily    losartan (Cozaar) 100 mg tablet 1 tablet, oral, Daily    losartan (COZAAR) 50 mg, oral, Daily    magnesium oxide-Mg AA chelate (Magnesium, oxide/AA chelate,) 300 mg capsule 1 capsule, oral, Daily    melatonin 3 mg tablet 2 tablet BEDTIME (route: oral)    montelukast (SINGULAIR) 10 mg, oral, Daily    multivitamin with minerals (multivit-min-iron fum-folic ac) tablet 1 tablet, oral, Daily    naproxen (NAPROSYN) 500 mg, oral, 2 times daily with meals    nystatin (Mycostatin) 100,000 unit/gram powder APPLY 2-3 TIMES DAILY TO AFFECTED AREA(S).  As needed.    omega 3-dha-epa-fish oil (Fish OiL) 1,200 (144-216) mg capsule 1 capsule, oral, Daily    omeprazole (PriLOSEC) 20 mg DR capsule oral    omeprazole (PRILOSEC) 40 mg, oral, 2 times daily    pravastatin (PRAVACHOL) 40 mg, oral, Daily    simethicone (Mylicon) 80 mg chewable tablet oral    traMADol (Ultram) 50 mg tablet 1 tablet EVERY 6 HOURS (route: oral)    triamterene-hydrochlorothiazid (Maxzide-25) 37.5-25 mg tablet 1 tablet, oral, Daily    vitamin E 180 mg (400 unit) capsule Take one(1) capsule twice daily.    Wixela Inhub 250-50 mcg/dose diskus inhaler 1 puff, inhalation, Every 12 hours    zinc sulfate (Zincate) 220 (50 Zn) MG capsule 1 capsule, oral, Daily     Review of Systems    Objective   /72 (BP Location: Left arm, Patient Position:  Sitting, BP Cuff Size: Adult)   Pulse 84   Temp 36.4 °C (97.6 °F) (Temporal)   Wt 101 kg (222 lb 12.8 oz)   BMI 39.47 kg/m²   Physical Exam  There is severe flat foot deformity B. She has no ulceration.    Assessment/Plan   Problem List Items Addressed This Visit             ICD-10-CM    Primary osteoarthritis of both feet - Primary M19.071, M19.072     At present, this is a disabling condition and I unfortunately do not see any easy intervention to reduce her pain. APT for generalized weakness did not lead to improvement in balance scores. She will need to consider adapting her environment to mitigate the impact of this condition on her independence. She may need to consider using a scooter and performing/implementing environmental modification to her dwelling. I do not think that surgical reconstruction of both feet is a realistic option. She should consider a mobility evaluation and purchasing a vehicle which might accommodate a scooter. She may wish to discuss her OA with a physiatrist and consult with a pain management specialist to see if neuromodulation might be of use. She is using appropriate, .non-addictive, but admittedly less than effective oral treatments for pain.

## 2023-12-11 NOTE — ASSESSMENT & PLAN NOTE
At present, this is a disabling condition and I unfortunately do not see any easy intervention to reduce her pain. APT for generalized weakness did not lead to improvement in balance scores. She will need to consider adapting her environment to mitigate the impact of this condition on her independence. She may need to consider using a scooter and performing/implementing environmental modification to her dwelling. I do not think that surgical reconstruction of both feet is a realistic option. She should consider a mobility evaluation and purchasing a vehicle which might accommodate a scooter. She may wish to discuss her OA with a physiatrist and consult with a pain management specialist to see if neuromodulation might be of use. She is using appropriate, .non-addictive, but admittedly less than effective oral treatments for pain.

## 2023-12-18 ENCOUNTER — ANCILLARY PROCEDURE (OUTPATIENT)
Dept: RADIOLOGY | Facility: CLINIC | Age: 73
End: 2023-12-18
Payer: MEDICARE

## 2023-12-18 DIAGNOSIS — M19.071 PRIMARY OSTEOARTHRITIS OF BOTH FEET: ICD-10-CM

## 2023-12-18 DIAGNOSIS — M19.072 PRIMARY OSTEOARTHRITIS OF BOTH FEET: ICD-10-CM

## 2023-12-18 PROCEDURE — 73630 X-RAY EXAM OF FOOT: CPT | Mod: RIGHT SIDE | Performed by: RADIOLOGY

## 2023-12-18 PROCEDURE — 73630 X-RAY EXAM OF FOOT: CPT | Mod: LT

## 2023-12-18 PROCEDURE — 73630 X-RAY EXAM OF FOOT: CPT | Mod: RT,FY

## 2023-12-18 PROCEDURE — 73630 X-RAY EXAM OF FOOT: CPT | Mod: LEFT SIDE | Performed by: RADIOLOGY

## 2023-12-20 NOTE — RESULT ENCOUNTER NOTE
Mary,  Not unexpectedly, the x-rays of your feet show severe advanced arthritis.  I really think a strategy of mitigating the disability caused by your chronic foot pain is what needs to be pursued.  I do not imagine that there is a simple fix to this problem.  Although this interpretation is attached to the left foot x-ray it also stands for the right foot x-ray and I am not going to send you a separate note about the right foot.    Do not hesitate to contact me if you have questions or concerns.    Sincerely,  Skip Sal M.D.

## 2024-01-24 DIAGNOSIS — M51.36 DISC DEGENERATION, LUMBAR: Primary | ICD-10-CM

## 2024-01-24 RX ORDER — NAPROXEN 500 MG/1
500 TABLET ORAL
Qty: 180 TABLET | Refills: 3 | Status: SHIPPED | OUTPATIENT
Start: 2024-01-24

## 2024-02-05 DIAGNOSIS — J45.30 MILD PERSISTENT ASTHMA WITHOUT COMPLICATION (HHS-HCC): ICD-10-CM

## 2024-02-05 DIAGNOSIS — R45.89 DEPRESSED MOOD: ICD-10-CM

## 2024-02-05 DIAGNOSIS — E78.00 HYPERCHOLESTEROLEMIA: Primary | ICD-10-CM

## 2024-02-05 DIAGNOSIS — I10 BENIGN ESSENTIAL HYPERTENSION: ICD-10-CM

## 2024-02-05 RX ORDER — PRAVASTATIN SODIUM 40 MG/1
40 TABLET ORAL DAILY
Qty: 90 TABLET | Refills: 3 | Status: SHIPPED | OUTPATIENT
Start: 2024-02-05 | End: 2024-05-28

## 2024-02-05 RX ORDER — MONTELUKAST SODIUM 10 MG/1
10 TABLET ORAL DAILY
Qty: 90 TABLET | Refills: 3 | Status: SHIPPED | OUTPATIENT
Start: 2024-02-05

## 2024-02-05 RX ORDER — TRIAMTERENE/HYDROCHLOROTHIAZID 37.5-25 MG
1 TABLET ORAL DAILY
Qty: 90 TABLET | Refills: 3 | Status: SHIPPED | OUTPATIENT
Start: 2024-02-05

## 2024-02-05 RX ORDER — CITALOPRAM 40 MG/1
60 TABLET, FILM COATED ORAL DAILY
Qty: 135 TABLET | Refills: 3 | Status: SHIPPED | OUTPATIENT
Start: 2024-02-05 | End: 2024-05-06 | Stop reason: SDUPTHER

## 2024-03-12 DIAGNOSIS — F41.9 ANXIETY: ICD-10-CM

## 2024-03-12 RX ORDER — ALPRAZOLAM 0.5 MG/1
0.5 TABLET, EXTENDED RELEASE ORAL DAILY
Qty: 90 TABLET | Refills: 0 | OUTPATIENT
Start: 2024-03-12 | End: 2024-06-10

## 2024-03-18 ENCOUNTER — OFFICE VISIT (OUTPATIENT)
Dept: PAIN MEDICINE | Facility: HOSPITAL | Age: 74
End: 2024-03-18
Payer: MEDICARE

## 2024-03-18 DIAGNOSIS — M19.072 PRIMARY OSTEOARTHRITIS OF BOTH FEET: ICD-10-CM

## 2024-03-18 DIAGNOSIS — M19.071 PRIMARY OSTEOARTHRITIS OF BOTH FEET: ICD-10-CM

## 2024-03-18 PROCEDURE — 1036F TOBACCO NON-USER: CPT | Performed by: ANESTHESIOLOGY

## 2024-03-18 PROCEDURE — 99203 OFFICE O/P NEW LOW 30 MIN: CPT | Performed by: ANESTHESIOLOGY

## 2024-03-18 PROCEDURE — 99213 OFFICE O/P EST LOW 20 MIN: CPT | Performed by: ANESTHESIOLOGY

## 2024-03-18 PROCEDURE — 4010F ACE/ARB THERAPY RXD/TAKEN: CPT | Performed by: ANESTHESIOLOGY

## 2024-03-18 PROCEDURE — 1125F AMNT PAIN NOTED PAIN PRSNT: CPT | Performed by: ANESTHESIOLOGY

## 2024-03-18 ASSESSMENT — PAIN SCALES - GENERAL: PAINLEVEL: 8

## 2024-03-18 NOTE — PROGRESS NOTES
Patient ID: Mary See is a 74 y.o. female who presents for initial evaluation of Foot Pain. Patient describes progressive bilateral foot pain and decreased ROM over the pas 30 years that severely increased about 3 years ago. She reports severe decrease in mobility over the past year with 1 fall almost every month. She's been seen by podiatry regularly, has tried PT and orthotics with limited success and was told her foot deformities are not surgically reparable. Patient takes Naproxen and Gabapentin for pain which does not provide much relief. Pain is constant when walking on feet and lately is painful even with light application of pressure when feet rest on ground.      Patient reports the pain began approximately                           Imaging:  Xrays Dec 2023     Right foot:  There is moderate joint space narrowing with osteophytosis the  midfoot articulations. There is moderate 1st MTP degenerative change  as well. There is soft tissue edema about the midfoot. There is pes  planus deformity. No definite fracture seen    Left foot:    There is severe arthropathy in the midfoot with joint space  narrowing, osteophytosis and sclerosis. In addition there is  fragmentation of the lateral navicular and subluxation of the  talonavicular and naviculocuneiform joints. 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    Lab Results   Component Value Date    HGBA1C 5.2 11/23/2021       Current Outpatient Medications   Medication Instructions    acetaminophen (Tylenol Extra Strength) 500 mg tablet oral    albuterol 90 mcg/actuation inhaler USE 2 INHALATIONS BY MOUTH  EVERY 6 HOURS AS NEEDED FOR SHORTNESS OF BREATH    albuterol 2.5 mg, nebulization, Every 8 hours PRN    ALPRAZolam XR (XANAX XR) 0.5 mg, oral, Daily    ascorbic acid (VITAMIN C ORAL) 1 tablet, oral, Daily    ascorbic acid, vitamin C, 1,000 mg ER tablet 1 tablet, oral, Daily    aspirin (Adult Low Dose Aspirin) 81 mg EC tablet 1 tablet 2 TIMES  DAILY (route: oral)    calcium carbonate-vitamin D3 600 mg-20 mcg (800 unit) tablet 1 tablet, oral, 2 times daily    carboxymethylcellulose-glycern 0.5-0.9 % drops Use 1-2 Drops in both eyes as needed.    cetirizine (ZyrTEC) 10 mg tablet 1 tablet, oral, Daily    cholecalciferol (Vitamin D-3) 125 MCG (5000 UT) capsule oral    citalopram (CELEXA) 60 mg, oral, Daily, Take 1 and 1/2 (one and one-half) tablets by mouth daily.    clobetasol (Temovate) 0.05 % cream APPLY TO AFFECTED AREA(S) TOPICALLY ONCE DAILY FOR 1 WEEK, THEN TWICE WEEKLY FOR MAINTENANCE.    estradiol (Estrace) 0.1 MG/GM vaginal cream vaginal    fluticasone (Flonase) 50 mcg/actuation nasal spray 2 sprays, Each Nostril, Daily    fluticasone (Flovent) 110 mcg/actuation inhaler inhalation    furosemide (LASIX) 20 mg, oral, Daily PRN    gabapentin (NEURONTIN) 900 mg, oral, 3 times daily    glucosamine-chondroit-vit C-Mn (Glucosamine Chondroitin MaxStr) 500-400 mg capsule 1,500 mg, oral    glucosamine/chondr wray A sod (glucosamine-chondroitin) 750-600 mg tablet tablet 2 tablet DAILY (route: oral)    guaiFENesin (Mucinex) 1,200 mg tablet extended release 12hr 1 tablet, oral, Every 12 hours, FOR CONGESTION    ketoconazole (NIZOral) 2 % cream Topical, 2 times daily    krill/om3/dha/epa/om6/lip/astx (KRILL OIL, OMEGA 3 AND 6, ORAL) oral    levothyroxine (SYNTHROID, LEVOXYL) 150 mcg, oral, Daily    losartan (Cozaar) 100 mg tablet 1 tablet, oral, Daily    losartan (COZAAR) 50 mg, oral, Daily    magnesium oxide-Mg AA chelate (Magnesium, oxide/AA chelate,) 300 mg capsule 1 capsule, oral, Daily    melatonin 3 mg tablet 2 tablet BEDTIME (route: oral)    montelukast (SINGULAIR) 10 mg, oral, Daily, as directed    multivitamin with minerals (multivit-min-iron fum-folic ac) tablet 1 tablet, oral, Daily    naproxen (NAPROSYN) 500 mg, oral, 2 times daily with meals    nystatin (Mycostatin) 100,000 unit/gram powder APPLY 2-3 TIMES DAILY TO AFFECTED AREA(S).  As needed.     omega 3-dha-epa-fish oil (Fish OiL) 1,200 (144-216) mg capsule 1 capsule, oral, Daily    omeprazole (PriLOSEC) 20 mg DR capsule oral    omeprazole (PRILOSEC) 40 mg, oral, 2 times daily    pravastatin (PRAVACHOL) 40 mg, oral, Daily    simethicone (Mylicon) 80 mg chewable tablet oral    triamterene-hydrochlorothiazid (Maxzide-25) 37.5-25 mg tablet 1 tablet, oral, Daily    vitamin E 180 mg (400 unit) capsule Take one(1) capsule twice daily.    Wixela Inhub 250-50 mcg/dose diskus inhaler 1 puff, inhalation, Every 12 hours    zinc sulfate (Zincate) 220 (50 Zn) MG capsule 1 capsule, oral, Daily        Past Medical History:   Diagnosis Date    Carpal tunnel syndrome, bilateral upper limbs 2020    Severe carpal tunnel syndrome of both wrists    Inadequate sleep hygiene 10/01/2018    Inadequate sleep hygiene    Jaw pain 2018    Jaw pain    Other conditions influencing health status 2018    Knee Replacement    Other specified postprocedural states 10/01/2018    History of hip surgery        Past Surgical History:   Procedure Laterality Date    ANKLE SURGERY  10/01/2018    Ankle Surgery    CATARACT EXTRACTION  10/01/2018    Cataract Surgery     SECTION, CLASSIC  10/01/2018     Section    HERNIA REPAIR  10/01/2018    Inguinal Hernia Repair    OTHER SURGICAL HISTORY  2021    Tracheostomy    OTHER SURGICAL HISTORY  10/01/2018    Corneal LASIK Bilateral    TONSILLECTOMY  10/01/2018    Tonsillectomy    TOTAL HIP ARTHROPLASTY  10/01/2018    Total Hip Replacement    TOTAL KNEE ARTHROPLASTY  10/01/2018    Knee Replacement        Family History   Problem Relation Name Age of Onset    Cancer Mother          ADRENAL GLAND; DIGESTIVE ORGAN    Other (ANXIETY AND DEPRESSION) Mother      Alcohol abuse Father      Liver cancer Father      Ovarian cancer Sister      Dilated cardiomyopathy Sister          NONISCHEMIC        Allergies   Allergen Reactions    Ibuprofen Shortness of breath     GI UPSET     Gluten Unknown     bloating,pain ,gas and diarrrhea    Adhesive Tape-Silicones Unknown and Other     incision glue    Lactose Other    Miconazole Hives    Other Unknown and Rash     Dial soap    Soap Rash     Dial soap        Objective     There were no vitals filed for this visit.     Physical Exam    GENERAL EXAM  Vital Signs: Vital signs to include heart rate, respiration rate, blood pressure, and temperature were reviewed.  General Appearance:  Awake, alert, healthy appearing, well developed, No acute distress.  Head: Normocephalic without evidence of head injury.  Neck: The appearance of the neck was normal without swelling with a midline trachea.  Eyes: The eyelids and eyebrows exhibited no abnormalities.  Pupils were not pin-point.  Sclera was without icterus.  Lungs: Respiration rhythm and depth was normal.  Respiratory movements were normal without labored breathing.  Cardiovascular: No peripheral edema was present.    Neurological: Patient was oriented to time, place, and person.  Speech was normal.  Balance, gait, and stance were unremarkable.    Psychiatric: Appearance was normal with appropriate dress.  Mood was euthymic and affect was normal.  Skin: Affected regions were without ecchymosis or skin lesions.    Musculoskeletal Exam:  5/5 muscle strength of bilateral hip flexors, knee extensors, ankle dorsiflexors and plantarflexors, and EHL  Light touch sensation intact to bilateral upper and lower extremities  Bilateral feet have flat souls with calluses  Mild pain to palpation left plantar fascia   ROM toes intact   LIMITED ROM on inversion and cannot venessa feet at all       Assessment/Plan   Diagnoses and all orders for this visit:  Primary osteoarthritis of both feet  -     Referral to Pain Medicine      Mary See is a 74 y.o. female who presents for bilateral foot pain 2/2 deformity for 30 years, more significantly increased over the past 3 years. Images significant for severe arthritis and pes  deformity. Patient sees podiatry regularly and has failed most conservative measures. There is not much we can offer her in terms of pain management other than possibly trying some different medications. We recommend she see orthopedic surgery for further evaluation.     Plan:  - Will refer patient for orthopedic surgery  - Advised patient to talk to her PCP about switching from her Citalopram, to Duloxetine   - Follow-up as needed    Rafat Spears MD  Anesthesiology     Patient seen and examined with attending, Dr. Santos who agrees with assessment and plan.

## 2024-03-19 NOTE — PROGRESS NOTES
"Chief complaint: Bilateral foot pain    Dear Dr. Sal, Skip CHAVEZ MD    I had the pleasure of seeing your patient, Mary See, in clinic today. As you know, She is a pleasant 74 y.o. right handed woman with past medical history significant for HTN, HLD, hypothyroidism, diabetes, IBS, depression, anxiety, fibromyalgia, migraines, asthma, MALIHA, tracheocutaneous fistula following tracheostomy who presents for evaluation of bilateral foot pain.    TIMELINE OF COMPLAINT(S):    She says that she has had many years of pain, but flatfeet \"all my life\".  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  She has had various different orthotics over the past 30 years, and her arch just kept getting worse bilaterally.  At 1 point a podiatrist put her into AFOs, but these hurt too much.  She recently got custom made orthotics from  which helped with her balance, but now with her pain.  No orthotics have ever helped with her pain, but help help with balance, stability, falling less.    She does fall, previously more frequently, either due to mechanical reasons or she can clear her toes and then falls forward.    Pain was present intermittnetly for many decades. Really bad in 50's. Worse with standing and walking 15 min , gets worse with time    Both knees, hips, R shoulder replaced, all helped.     She does have chronic low back pain with episodes of bilateral sciatica, 2011 and 2013  back injections helped.  She does not feel that the back pain in the foot pain is connected at all.    She has an appointment scheduled with Dr. Petersen coming up.    Pain:  LOCATION- Bilateral plantar foot, usually L>R starts at distal heel and spreads on plantar surface  RADIATION-  CONSTANT or INTERMITTENT- Intermittent   SEVERITY/QUANTITY- 8  QUALITY- burning and sharp, gnawing  WEAKNESS- No  NUMBNESS/TINGLING- Feet  ASSOCIATED WITH-used to fall a lot more before " recent orthotics, toe would snag or drag. Last major fall (hit head) 8/23.  EXACERBATED BY- Standing, walking  BETTER WITH- Sitting-feet up  TRIED- Tylenol takes edge off , CBD gel helped shoulder not feet      Anti-Inflammatories: Naproxen helps other pain, but not feet      Muscle relaxants:      Anti-depressants:      Neuroleptics: Gabapentin 900 mg 2-3 times daily helps      LDN:    PHYSICAL THERAPY: Yes, Fall 2023 for balance and feet pain, didn't help. No HEP  CHIROPRACTIC MANIPULATION: Yes  TENS unit: No  ACUPUNCTURE TREATMENTS: Yes, not for this  DEEP TISSUE MASSAGE THERAPY:  No  OSTEOPATHIC MANIPULATION THERAPY: No    EMG/NCS: No    INJECTIONS: only injections in the back, not feet    IMAGING: Yes    === 06/14/18 ===    MRI CERVICAL SPINE WO CONTRAST    - Impression -  *Cervical spondylosis as described  THIS EXAMIN[place] [facility] [place]    === 11/03/21 ===    CT 3D RECONSTRUCTION    - Impression -  1. Severe glenohumeral and acromioclavicular joint degenerative  change.  2. Rotator cuff muscle atrophy, moderate in the infraspinatus muscle.    === 12/18/23 ===    XR FOOT 3+ VIEWS RIGHT    - Impression -  Moderate degenerative change about the midfoot with pes planus  deformity. Findings may represent early neuropathic arthropathy  (Charcot foot)    Left foot xray 12/18/23  IMPRESSION:  Destructive arthropathy in the midfoot compatible with neuropathic  arthropathy (Charcot foot).            Lab Results   Component Value Date    HGBA1C 5.2 11/23/2021       FUNCTIONAL HISTORY: The patient is independent in all ADLs, mobility, and driving. The patient uses a cane for outside the home. Metatarsal pads help    SH:  Lives in: [place]  Lives with:   Occupation: Retired  Tobacco: Former,, in the 1970's on and off 7 years  Alcohol: Occasionally  Drugs: No    ROS: The patient denies any bowel or bladder incontinence/accidents, night sweats, fevers, chills, recent significant weight loss. A 14 point  review of systems was reviewed with the patient and is as above and otherwise negative.  ROS questionnaire positive for weight gain, poor balance, difficulty walking, falls, palpitations, shortness of breath, muscle pain/tightness, joint pain, back pain, swelling, rash, depression, anxiety    Physical Exam  Musculoskeletal:        Feet:    Feet:      Comments: Bilateral foot pain.         PHYSICAL EXAM    GEN - Alert, well-developed, well-nourished, no acute distress  PSYCH - Cooperative, appropriate mood and affect  HEENT - NC/AT  RESP - Non-labored respirations, equal expansion  CV - warm and well-perfused, No cyanosis or edema in extremities.   ABD- soft, ND, obese  SKIN - No rash.    Severe pes planus bilaterally, multiple calluses in the plantar aspect of the feet, mild tenderness to palpation in the midfoot metacarpals bilaterally, no tenderness elsewhere.  Full range of motion passively, she had some difficulty with EHL and ankle dorsiflexion active movement.    NEURO -   LE strength 5/5 -  including hip flexors (4/5 bilaterally), knee flexors, knee extensors, ankle dorsiflexors (4/5 bilaterally), ankle plantar flexors, and EHL (2/5 on the right, 5/5 on the left)  Sensation - intact to light touch in bilateral lower extremities.   Reflexes -absent patellar and Achilles reflexes bilaterally No Clonus  GAIT -wide base, shortened stride length, waddling gait, glue medius weakness, antalgic, bilateral foot out-toeing and bilateral pes planus.      IMPRESSION:    This is a pleasant 74 y.o. right handed woman with past medical history significant for HTN, HLD, hypothyroidism, diabetes, IBS, depression, anxiety, fibromyalgia, migraines, asthma, MALIHA, tracheocutaneous fistula following tracheostomy who presents for evaluation of bilateral foot pain.  Physical exam is notable for bilateral foot dorsiflexion weakness and right EHL weakness, likely stemming from her chronic lumbar radiculopathy.  Symptoms and physical  exam findings in this complex patient are undoubtably multifactorial in nature but due to combination of moderate to severe degenerative changes of the midfoot, exacerbated by pes planus, bilateral lower extremity weakness, abnormal gait biomechanics, possibly contribution from spinal stenosis/radiculopathy.        -Patient Education: Extensive time was spent educating the patient on relevant anatomy, clinical findings and imaging, as well as discussing the potential diagnoses as discussed above.   -Try Voltaren gel on the area of pain 4 times per day for 2 weeks straight and then as needed up to 4 times per day.  This is over-the-counter.    -Call the Gait lab at [phone].    -Consider other medications in the future  -Consider ultrasound-guided midfoot injections  -Proceed with appointment with Dr. Petersen  -The most important is to resume daily strengthening exercises, some exercises provided  -Follow-up 4 to 6 weeks, or after seeing the gait lab        The patient expressed understanding and agreement with the assessment and plan. Patient encouraged to contact us should they have any questions, concerns, or any changes in symptoms.     Thank you for allowing me to participate in the care of your patient.      ** Dictated with voice recognition software, please forgive any errors in grammar and/or spelling **

## 2024-03-20 ENCOUNTER — CONSULT (OUTPATIENT)
Dept: PHYSICAL MEDICINE AND REHAB | Facility: CLINIC | Age: 74
End: 2024-03-20
Payer: MEDICARE

## 2024-03-20 VITALS
DIASTOLIC BLOOD PRESSURE: 62 MMHG | WEIGHT: 242 LBS | TEMPERATURE: 97.9 F | OXYGEN SATURATION: 98 % | HEART RATE: 66 BPM | HEIGHT: 63 IN | SYSTOLIC BLOOD PRESSURE: 143 MMHG | BODY MASS INDEX: 42.88 KG/M2

## 2024-03-20 DIAGNOSIS — M21.42 PES PLANUS OF BOTH FEET: ICD-10-CM

## 2024-03-20 DIAGNOSIS — M19.072 PRIMARY OSTEOARTHRITIS OF BOTH FEET: ICD-10-CM

## 2024-03-20 DIAGNOSIS — M14.672 CHARCOT'S JOINT OF LEFT FOOT: ICD-10-CM

## 2024-03-20 DIAGNOSIS — M21.41 PES PLANUS OF BOTH FEET: ICD-10-CM

## 2024-03-20 DIAGNOSIS — R29.898 WEAKNESS OF BOTH LOWER EXTREMITIES: ICD-10-CM

## 2024-03-20 DIAGNOSIS — M19.071 PRIMARY OSTEOARTHRITIS OF BOTH FEET: ICD-10-CM

## 2024-03-20 DIAGNOSIS — M14.671 CHARCOT'S JOINT OF RIGHT FOOT: Primary | ICD-10-CM

## 2024-03-20 PROBLEM — M21.40 FLAT FOOT: Status: ACTIVE | Noted: 2024-03-20

## 2024-03-20 PROCEDURE — 99205 OFFICE O/P NEW HI 60 MIN: CPT | Performed by: PHYSICAL MEDICINE & REHABILITATION

## 2024-03-20 RX ORDER — ALPRAZOLAM 0.5 MG/1
0.5 TABLET, EXTENDED RELEASE ORAL DAILY
Qty: 90 TABLET | Refills: 0 | Status: SHIPPED | OUTPATIENT
Start: 2024-03-20 | End: 2024-06-18

## 2024-03-20 ASSESSMENT — PAIN SCALES - GENERAL: PAINLEVEL: 8

## 2024-03-20 NOTE — PATIENT INSTRUCTIONS
-Try Voltaren gel on the area of pain 4 times per day for 2 weeks straight and then as needed up to 4 times per day.  This is over-the-counter.    -Call the Gait lab at [phone].    -Consider other medications in the future  -Consider ultrasound-guided midfoot injections  -Proceed with appointment with Dr. Petersen  -The most important is to resume daily strengthening exercises, some exercises provided  -Follow-up 4 to 6 weeks, or after seeing the gait lab

## 2024-03-20 NOTE — LETTER
"March 20, 2024     Skip Sal MD  [address] [facility] [address]    Patient: Mary See   YOB: 1950   Date of Visit: 3/20/2024       Dear Dr. Skip Sal MD:    Thank you for referring Mary See to me for evaluation. Below are my notes for this consultation.  If you have questions, please do not hesitate to call me. I look forward to following your patient along with you.       Sincerely,     Juanis Knight MD      CC: No Recipients  ______________________________________________________________________________________    Chief complaint: Bilateral foot pain    Dear Dr. Sal, Skip CHAVEZ MD    I had the pleasure of seeing your patient, Mary See, in clinic today. As you know, She is a pleasant 74 y.o. right handed woman with past medical history significant for HTN, HLD, hypothyroidism, diabetes, IBS, depression, anxiety, fibromyalgia, migraines, asthma, MALIHA, tracheocutaneous fistula following tracheostomy who presents for evaluation of bilateral foot pain.    TIMELINE OF COMPLAINT(S):    She says that she has had many years of pain, but flatfeet \"all my life\".  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  She has had various different orthotics over the past 30 years, and her arch just kept getting worse bilaterally.  At 1 point a podiatrist put her into AFOs, but these hurt too much.  She recently got custom made orthotics from  which helped with her balance, but now with her pain.  No orthotics have ever helped with her pain, but help help with balance, stability, falling less.    She does fall, previously more frequently, either due to mechanical reasons or she can clear her toes and then falls forward.    Pain was present intermittnetly for many decades. Really bad in 50's. Worse with standing and walking 15 min , gets worse with time    Both knees, hips, R shoulder replaced, all " helped.     She does have chronic low back pain with episodes of bilateral sciatica, 2011 and 2013  back injections helped.  She does not feel that the back pain in the foot pain is connected at all.    She has an appointment scheduled with Dr. Petersen coming up.    Pain:  LOCATION- Bilateral plantar foot, usually L>R starts at distal heel and spreads on plantar surface  RADIATION-  CONSTANT or INTERMITTENT- Intermittent   SEVERITY/QUANTITY- 8  QUALITY- burning and sharp, gnawing  WEAKNESS- No  NUMBNESS/TINGLING- Feet  ASSOCIATED WITH-used to fall a lot more before recent orthotics, toe would snag or drag. Last major fall (hit head) 8/23.  EXACERBATED BY- Standing, walking  BETTER WITH- Sitting-feet up  TRIED- Tylenol takes edge off , CBD gel helped shoulder not feet      Anti-Inflammatories: Naproxen helps other pain, but not feet      Muscle relaxants:      Anti-depressants:      Neuroleptics: Gabapentin 900 mg 2-3 times daily helps      LDN:    PHYSICAL THERAPY: Yes, Fall 2023 for balance and feet pain, didn't help. No HEP  CHIROPRACTIC MANIPULATION: Yes  TENS unit: No  ACUPUNCTURE TREATMENTS: Yes, not for this  DEEP TISSUE MASSAGE THERAPY:  No  OSTEOPATHIC MANIPULATION THERAPY: No    EMG/NCS: No    INJECTIONS: only injections in the back, not feet    IMAGING: Yes    === 06/14/18 ===    MRI CERVICAL SPINE WO CONTRAST    - Impression -  *Cervical spondylosis as described  THIS EXAMINATION WAS INTERPRETED AT [facility] [place]    === 11/03/21 ===    CT 3D RECONSTRUCTION    - Impression -  1. Severe glenohumeral and acromioclavicular joint degenerative  change.  2. Rotator cuff muscle atrophy, moderate in the infraspinatus muscle.    === 12/18/23 ===    XR FOOT 3+ VIEWS RIGHT    - Impression -  Moderate degenerative change about the midfoot with pes planus  deformity. Findings may represent early neuropathic arthropathy  (Charcot foot)    Left foot xray 12/18/23  IMPRESSION:  Destructive arthropathy in the midfoot compatible with  neuropathic  arthropathy (Charcot foot).            Lab Results   Component Value Date    HGBA1C 5.2 11/23/2021       FUNCTIONAL HISTORY: The patient is independent in all ADLs, mobility, and driving. The patient uses a cane for outside the home. Metatarsal pads help    SH:  Lives in: [place]  Lives with:   Occupation: Retired  Tobacco: Former,, in the 1970's on and off 7 years  Alcohol: Occasionally  Drugs: No    ROS: The patient denies any bowel or bladder incontinence/accidents, night sweats, fevers, chills, recent significant weight loss. A 14 point review of systems was reviewed with the patient and is as above and otherwise negative.  ROS questionnaire positive for weight gain, poor balance, difficulty walking, falls, palpitations, shortness of breath, muscle pain/tightness, joint pain, back pain, swelling, rash, depression, anxiety    Physical Exam  Musculoskeletal:        Feet:    Feet:      Comments: Bilateral foot pain.         PHYSICAL EXAM    GEN - Alert, well-developed, well-nourished, no acute distress  PSYCH - Cooperative, appropriate mood and affect  HEENT - NC/AT  RESP - Non-labored respirations, equal expansion  CV - warm and well-perfused, No cyanosis or edema in extremities.   ABD- soft, ND, obese  SKIN - No rash.    Severe pes planus bilaterally, multiple calluses in the plantar aspect of the feet, mild tenderness to palpation in the midfoot metacarpals bilaterally, no tenderness elsewhere.  Full range of motion passively, she had some difficulty with EHL and ankle dorsiflexion active movement.    NEURO -   LE strength 5/5 -  including hip flexors (4/5 bilaterally), knee flexors, knee extensors, ankle dorsiflexors (4/5 bilaterally), ankle plantar flexors, and EHL (2/5 on the right, 5/5 on the left)  Sensation - intact to light touch in bilateral lower extremities.   Reflexes -absent patellar and Achilles reflexes bilaterally No Clonus  GAIT -wide base, shortened stride length, waddling  gait, glue medius weakness, antalgic, bilateral foot out-toeing and bilateral pes planus.      IMPRESSION:    This is a pleasant 74 y.o. right handed woman with past medical history significant for HTN, HLD, hypothyroidism, diabetes, IBS, depression, anxiety, fibromyalgia, migraines, asthma, MALIHA, tracheocutaneous fistula following tracheostomy who presents for evaluation of bilateral foot pain.  Physical exam is notable for bilateral foot dorsiflexion weakness and right EHL weakness, likely stemming from her chronic lumbar radiculopathy.  Symptoms and physical exam findings in this complex patient are undoubtably multifactorial in nature but due to combination of moderate to severe degenerative changes of the midfoot, exacerbated by pes planus, bilateral lower extremity weakness, abnormal gait biomechanics, possibly contribution from spinal stenosis/radiculopathy.        -Patient Education: Extensive time was spent educating the patient on relevant anatomy, clinical findings and imaging, as well as discussing the potential diagnoses as discussed above.   -Try Voltaren gel on the area of pain 4 times per day for 2 weeks straight and then as needed up to 4 times per day.  This is over-the-counter.    -Call the Gait lab at [phone].    -Consider other medications in the future  -Consider ultrasound-guided midfoot injections  -Proceed with appointment with Dr. Petersen  -The most important is to resume daily strengthening exercises, some exercises provided  -Follow-up 4 to 6 weeks, or after seeing the gait lab        The patient expressed understanding and agreement with the assessment and plan. Patient encouraged to contact us should they have any questions, concerns, or any changes in symptoms.     Thank you for allowing me to participate in the care of your patient.      ** Dictated with voice recognition software, please forgive any errors in grammar and/or spelling **

## 2024-03-26 ENCOUNTER — APPOINTMENT (OUTPATIENT)
Dept: ORTHOPEDIC SURGERY | Facility: CLINIC | Age: 74
End: 2024-03-26
Payer: MEDICARE

## 2024-03-26 ENCOUNTER — APPOINTMENT (OUTPATIENT)
Dept: RADIOLOGY | Facility: CLINIC | Age: 74
End: 2024-03-26
Payer: MEDICARE

## 2024-04-11 ENCOUNTER — OFFICE VISIT (OUTPATIENT)
Dept: ORTHOPEDIC SURGERY | Facility: CLINIC | Age: 74
End: 2024-04-11
Payer: MEDICARE

## 2024-04-11 ENCOUNTER — HOSPITAL ENCOUNTER (OUTPATIENT)
Dept: RADIOLOGY | Facility: CLINIC | Age: 74
Discharge: HOME | End: 2024-04-11
Payer: MEDICARE

## 2024-04-11 DIAGNOSIS — M79.672 BILATERAL FOOT PAIN: ICD-10-CM

## 2024-04-11 DIAGNOSIS — M79.671 BILATERAL FOOT PAIN: ICD-10-CM

## 2024-04-11 DIAGNOSIS — M14.671 CHARCOT'S JOINT OF RIGHT FOOT: ICD-10-CM

## 2024-04-11 DIAGNOSIS — M14.672 CHARCOT'S JOINT OF LEFT FOOT: Primary | ICD-10-CM

## 2024-04-11 DIAGNOSIS — M19.071 PRIMARY OSTEOARTHRITIS OF BOTH FEET: ICD-10-CM

## 2024-04-11 DIAGNOSIS — M19.072 PRIMARY OSTEOARTHRITIS OF BOTH FEET: ICD-10-CM

## 2024-04-11 DIAGNOSIS — M21.41 PES PLANUS OF BOTH FEET: ICD-10-CM

## 2024-04-11 DIAGNOSIS — M21.42 PES PLANUS OF BOTH FEET: ICD-10-CM

## 2024-04-11 PROCEDURE — 1160F RVW MEDS BY RX/DR IN RCRD: CPT | Performed by: STUDENT IN AN ORGANIZED HEALTH CARE EDUCATION/TRAINING PROGRAM

## 2024-04-11 PROCEDURE — 73630 X-RAY EXAM OF FOOT: CPT | Mod: 50

## 2024-04-11 PROCEDURE — 1125F AMNT PAIN NOTED PAIN PRSNT: CPT | Performed by: STUDENT IN AN ORGANIZED HEALTH CARE EDUCATION/TRAINING PROGRAM

## 2024-04-11 PROCEDURE — 4010F ACE/ARB THERAPY RXD/TAKEN: CPT | Performed by: STUDENT IN AN ORGANIZED HEALTH CARE EDUCATION/TRAINING PROGRAM

## 2024-04-11 PROCEDURE — 99213 OFFICE O/P EST LOW 20 MIN: CPT | Performed by: STUDENT IN AN ORGANIZED HEALTH CARE EDUCATION/TRAINING PROGRAM

## 2024-04-11 PROCEDURE — 73630 X-RAY EXAM OF FOOT: CPT | Mod: BILATERAL PROCEDURE | Performed by: RADIOLOGY

## 2024-04-11 PROCEDURE — 1036F TOBACCO NON-USER: CPT | Performed by: STUDENT IN AN ORGANIZED HEALTH CARE EDUCATION/TRAINING PROGRAM

## 2024-04-11 PROCEDURE — 1159F MED LIST DOCD IN RCRD: CPT | Performed by: STUDENT IN AN ORGANIZED HEALTH CARE EDUCATION/TRAINING PROGRAM

## 2024-04-11 ASSESSMENT — PAIN SCALES - GENERAL: PAINLEVEL_OUTOF10: 8

## 2024-04-11 ASSESSMENT — PAIN - FUNCTIONAL ASSESSMENT: PAIN_FUNCTIONAL_ASSESSMENT: 0-10

## 2024-04-11 NOTE — LETTER
April 11, 2024     Nhung Santos MD PhD  [id number]  [facility] [address]    Patient: Mary See   YOB: 1950   Date of Visit: 4/11/2024       Dear Dr. Nhung Santos MD PhD:    Thank you for referring Mary See to me for evaluation. Below are my notes for this consultation.  If you have questions, please do not hesitate to call me. I look forward to following your patient along with you.       Sincerely,     Wiliam Petersen MD      CC: No Recipients  ______________________________________________________________________________________    ORTHOPAEDIC SURGERY HISTORY & PHYSICAL     Chief Complaint:  Bilateral r>L foot pain    History Of Present Illness  Mary See is a 74 y.o. female who presents for evaluation of bilateral longstanding right worse than left foot pain.  This has been going on for more than 10 years.  Patient reports no significant pain at rest but does report baseline 4 out of 10 pain with ambulation that can exacerbate to 7-8 out of 10 with walking after 15 minutes and she ambulates with the use of a cane at baseline.  She takes Naprosyn at baseline for pain in multiple joints.  She has had bilateral knee, hip and right total shoulder arthroplasty.      Patient has not had any significant change in symptoms of late.  She has followed with a podiatrist and most recently updated her orthotics in October 2023.  She is awaiting a device that is being fabricated with the gait lab and she is planning to pick this up next week.  She has had a history of bilateral foot drop and has difficulty raising her great toe on the right.  Patient reports that she has had flatfeet all of her life they have only progressed over time.     Past Medical History  Past Medical History:   Diagnosis Date   • Carpal tunnel syndrome, bilateral upper limbs 05/18/2020    Severe carpal tunnel syndrome of both wrists   • Inadequate sleep hygiene 10/01/2018    Inadequate sleep hygiene   • Jaw pain  07/16/2018    Jaw pain   • Other conditions influencing health status 07/16/2018    Knee Replacement   • Other specified postprocedural states 10/01/2018    History of hip surgery       Surgical History  Recent Surgeries in Orthopaedic Surgery            No cases to display             Social History  Social History     Socioeconomic History   • Marital status:      Spouse name: Not on file   • Number of children: Not on file   • Years of education: Not on file   • Highest education level: Not on file   Occupational History   • Not on file   Tobacco Use   • Smoking status: Never   • Smokeless tobacco: Never   Vaping Use   • Vaping status: Never Used   Substance and Sexual Activity   • Alcohol use: Never   • Drug use: Never   • Sexual activity: Not on file   Other Topics Concern   • Not on file   Social History Narrative   • Not on file     Social Determinants of Health     Financial Resource Strain: Not on file   Food Insecurity: Not on file   Transportation Needs: Not on file   Physical Activity: Not on file   Stress: Not on file   Social Connections: Not on file   Intimate Partner Violence: Not on file   Housing Stability: Not on file       Family History  Family History   Problem Relation Name Age of Onset   • Cancer Mother          ADRENAL GLAND; DIGESTIVE ORGAN   • Other (ANXIETY AND DEPRESSION) Mother     • Alcohol abuse Father     • Liver cancer Father     • Ovarian cancer Sister     • Dilated cardiomyopathy Sister          NONISCHEMIC        Allergies  Allergies   Allergen Reactions   • Ibuprofen Shortness of breath     GI UPSET   • Gluten Unknown     bloating,pain ,gas and diarrrhea   • Adhesive Tape-Silicones Unknown and Other     incision glue   • Lactose Other   • Miconazole Hives   • Other Unknown and Rash     Dial soap   • Soap Rash     Dial soap       Review of Systems  REVIEW OF SYSTEMS  Constitutional: no unplanned weight loss  Psychiatric: no suicidal ideation  ENT: no vision changes, no  sinus problems  Pulmonary: no shortness of breath  Lymphatic: no enlarged lymph nodes  Cardiovascular: no chest pain or shortness of breath  Gastrointestinal: no stomach problems  Genitourinary: no dysuria   Skin: no rashes  Endocrine: no thyroid problems  Neurological: no headache, no numbness  Hematological: no easy bruising  Musculoskeletal: Bilateral foot pain    Physical Exam  PHYSICAL EXAMINATION  Constitutional Exam: well developed and well nourished  Psychiatric Exam: alert and oriented, appropriate mood and behavior  Eye Exam: EOMI  Pulmonary Exam: breathing non-labored, no apparent distress  Lymphatic exam: no appreciable lymphadenopathy in the lower extremities  Cardiovascular exam: RRR to peripheral palpation, DP pulses 2+, PT 2+, toes are pink with good capillary refill, no pitting edema  Skin exam: no open lesions, rashes, abrasions or ulcerations  Neurological exam: sensation to light touch intact in both lower extremities in peripheral and dermatomal distributions (except for any abnormalities noted in musculoskeletal exam)    Musculoskeletal exam: Bilateral lower extremity examination.  Patient has diffuse pain about bilateral feet with localized tenderness to palpation about the sinus Tarsi bilaterally.  She has minimal tenderness palpation along the joint line of the tibiotalar joint bilaterally.  She has no obvious plantar ulceration bilaterally.  Patient has greater than physiologic hindfoot valgus, right worse than left with bilateral rocker-bottom deformity and 2 through 5 claw toe deformity bilaterally.  Patient is unable to perform assisted or double leg heel raise.  Patient has relatively pain-free ankle range of motion but restricted and painful subtalar and midtarsal joint range of motion.  Patient has sensation intact light touch grossly in saphenous, sural, superficial peroneal, deep peroneal and tibial nerve distribution.  Patient has 5 out of 5 strength in plantarflexion with 4- out  of 5 strength in dorsiflexion bilaterally and absent EHL function on the right.  She has 2+ DP/PT pulse palpated.    Last Recorded Vitals  There were no vitals taken for this visit.    Laboratory Results    No results found for this or any previous visit (from the past 24 hour(s)).    Radiology Results   X-ray imaging 3 view weightbearing bilateral feet reviewed from 04/11/2024 and independently evaluated by me demonstrates severe degenerative changes of the bilateral feet, particular the midfoot likely consistent with Charcot process versus OA with notable peritalar subluxation of the talonavicular joint on the right with rocker-bottom deformity bilaterally and degenerative midfoot changes versus Charcot arthropathy of the left side.    Assessment/Plan:  74-year-old female who presents for evaluation of bilateral foot pain likely stemming from arthritis versus Charcot process in the setting of bilateral pes planus.  I have reviewed the diagnosis and treatment options extensively with the patient.  In my impression the patient may continue weightbearing as tolerated in her bilateral lower extremity.  I discussed the role for TCI and Crow walkers, patient is currently awaiting construction of the device from the gait lab.  I discussed that she should trial this device and consideration could be made for modification versus obtaining new orthotic in the future.  I will plan to see the patient back in approximately 2 months to monitor clinical progress.  I reviewed that from a surgical perspective she would likely require a significant realignment arthrodesis, possibly involving the ankle. The patient is expressly not interested in pursuing surgery for this problem at this time.  Upon return, patient would require 3 view weightbearing bilateral ankle.    Wiliam Petersen MD, EFRAIN  Department of Orthopaedic Surgery  [facility]    The diagnosis and treatment plan were reviewed with  the patient. All questions were answered. The patient verbalized understanding of the treatment plan. There were no barriers to understanding identified.    Note dictated with [employer] software.  Completed without full type editing and sent to avoid delay.

## 2024-04-11 NOTE — PROGRESS NOTES
ORTHOPAEDIC SURGERY HISTORY & PHYSICAL     Chief Complaint:  Bilateral r>L foot pain    History Of Present Illness  Mary See is a 74 y.o. female who presents for evaluation of bilateral longstanding right worse than left foot pain.  This has been going on for more than 10 years.  Patient reports no significant pain at rest but does report baseline 4 out of 10 pain with ambulation that can exacerbate to 7-8 out of 10 with walking after 15 minutes and she ambulates with the use of a cane at baseline.  She takes Naprosyn at baseline for pain in multiple joints.  She has had bilateral knee, hip and right total shoulder arthroplasty.      Patient has not had any significant change in symptoms of late.  She has followed with a podiatrist and most recently updated her orthotics in October 2023.  She is awaiting a device that is being fabricated with the gait lab and she is planning to pick this up next week.  She has had a history of bilateral foot drop and has difficulty raising her great toe on the right.  Patient reports that she has had flatfeet all of her life they have only progressed over time.     Past Medical History  Past Medical History:   Diagnosis Date    Carpal tunnel syndrome, bilateral upper limbs 05/18/2020    Severe carpal tunnel syndrome of both wrists    Inadequate sleep hygiene 10/01/2018    Inadequate sleep hygiene    Jaw pain 07/16/2018    Jaw pain    Other conditions influencing health status 07/16/2018    Knee Replacement    Other specified postprocedural states 10/01/2018    History of hip surgery       Surgical History  Recent Surgeries in Orthopaedic Surgery            No cases to display             Social History  Social History     Socioeconomic History    Marital status:      Spouse name: Not on file    Number of children: Not on file    Years of education: Not on file    Highest education level: Not on file   Occupational History    Not on file   Tobacco Use    Smoking status:  Never    Smokeless tobacco: Never   Vaping Use    Vaping status: Never Used   Substance and Sexual Activity    Alcohol use: Never    Drug use: Never    Sexual activity: Not on file   Other Topics Concern    Not on file   Social History Narrative    Not on file     Social Determinants of Health     Financial Resource Strain: Not on file   Food Insecurity: Not on file   Transportation Needs: Not on file   Physical Activity: Not on file   Stress: Not on file   Social Connections: Not on file   Intimate Partner Violence: Not on file   Housing Stability: Not on file       Family History  Family History   Problem Relation Name Age of Onset    Cancer Mother          ADRENAL GLAND; DIGESTIVE ORGAN    Other (ANXIETY AND DEPRESSION) Mother      Alcohol abuse Father      Liver cancer Father      Ovarian cancer Sister      Dilated cardiomyopathy Sister          NONISCHEMIC        Allergies  Allergies   Allergen Reactions    Ibuprofen Shortness of breath     GI UPSET    Gluten Unknown     bloating,pain ,gas and diarrrhea    Adhesive Tape-Silicones Unknown and Other     incision glue    Lactose Other    Miconazole Hives    Other Unknown and Rash     Dial soap    Soap Rash     Dial soap       Review of Systems  REVIEW OF SYSTEMS  Constitutional: no unplanned weight loss  Psychiatric: no suicidal ideation  ENT: no vision changes, no sinus problems  Pulmonary: no shortness of breath  Lymphatic: no enlarged lymph nodes  Cardiovascular: no chest pain or shortness of breath  Gastrointestinal: no stomach problems  Genitourinary: no dysuria   Skin: no rashes  Endocrine: no thyroid problems  Neurological: no headache, no numbness  Hematological: no easy bruising  Musculoskeletal: Bilateral foot pain    Physical Exam  PHYSICAL EXAMINATION  Constitutional Exam: well developed and well nourished  Psychiatric Exam: alert and oriented, appropriate mood and behavior  Eye Exam: EOMI  Pulmonary Exam: breathing non-labored, no apparent  distress  Lymphatic exam: no appreciable lymphadenopathy in the lower extremities  Cardiovascular exam: RRR to peripheral palpation, DP pulses 2+, PT 2+, toes are pink with good capillary refill, no pitting edema  Skin exam: no open lesions, rashes, abrasions or ulcerations  Neurological exam: sensation to light touch intact in both lower extremities in peripheral and dermatomal distributions (except for any abnormalities noted in musculoskeletal exam)    Musculoskeletal exam: Bilateral lower extremity examination.  Patient has diffuse pain about bilateral feet with localized tenderness to palpation about the sinus Tarsi bilaterally.  She has minimal tenderness palpation along the joint line of the tibiotalar joint bilaterally.  She has no obvious plantar ulceration bilaterally.  Patient has greater than physiologic hindfoot valgus, right worse than left with bilateral rocker-bottom deformity and 2 through 5 claw toe deformity bilaterally.  Patient is unable to perform assisted or double leg heel raise.  Patient has relatively pain-free ankle range of motion but restricted and painful subtalar and midtarsal joint range of motion.  Patient has sensation intact light touch grossly in saphenous, sural, superficial peroneal, deep peroneal and tibial nerve distribution.  Patient has 5 out of 5 strength in plantarflexion with 4- out of 5 strength in dorsiflexion bilaterally and absent EHL function on the right.  She has 2+ DP/PT pulse palpated.    Last Recorded Vitals  There were no vitals taken for this visit.    Laboratory Results    No results found for this or any previous visit (from the past 24 hour(s)).    Radiology Results   X-ray imaging 3 view weightbearing bilateral feet reviewed from 04/11/2024 and independently evaluated by me demonstrates severe degenerative changes of the bilateral feet, particular the midfoot likely consistent with Charcot process versus OA with notable peritalar subluxation of the  talonavicular joint on the right with rocker-bottom deformity bilaterally and degenerative midfoot changes versus Charcot arthropathy of the left side.    Assessment/Plan:  74-year-old female who presents for evaluation of bilateral foot pain likely stemming from arthritis versus Charcot process in the setting of bilateral pes planus.  I have reviewed the diagnosis and treatment options extensively with the patient.  In my impression the patient may continue weightbearing as tolerated in her bilateral lower extremity.  I discussed the role for TCI and Crow walkers, patient is currently awaiting construction of the device from the gait lab.  I discussed that she should trial this device and consideration could be made for modification versus obtaining new orthotic in the future.  I will plan to see the patient back in approximately 2 months to monitor clinical progress.  I reviewed that from a surgical perspective she would likely require a significant realignment arthrodesis, possibly involving the ankle. The patient is expressly not interested in pursuing surgery for this problem at this time.  Upon return, patient would require 3 view weightbearing bilateral ankle.    Wiliam Petersen MD, EFRAIN  Department of Orthopaedic Surgery  [facility]    The diagnosis and treatment plan were reviewed with the patient. All questions were answered. The patient verbalized understanding of the treatment plan. There were no barriers to understanding identified.    Note dictated with [employer] software.  Completed without full type editing and sent to avoid delay.

## 2024-04-23 DIAGNOSIS — I10 BENIGN ESSENTIAL HYPERTENSION: ICD-10-CM

## 2024-04-25 RX ORDER — LOSARTAN POTASSIUM 50 MG/1
50 TABLET ORAL DAILY
Qty: 90 TABLET | Refills: 3 | Status: SHIPPED | OUTPATIENT
Start: 2024-04-25

## 2024-05-03 ENCOUNTER — HOSPITAL ENCOUNTER (OUTPATIENT)
Dept: RADIOLOGY | Facility: HOSPITAL | Age: 74
Discharge: HOME | End: 2024-05-03
Payer: MEDICARE

## 2024-05-03 ENCOUNTER — OFFICE VISIT (OUTPATIENT)
Dept: ORTHOPEDIC SURGERY | Facility: HOSPITAL | Age: 74
End: 2024-05-03
Payer: MEDICARE

## 2024-05-03 VITALS — WEIGHT: 242 LBS | HEIGHT: 63 IN | BODY MASS INDEX: 42.88 KG/M2

## 2024-05-03 DIAGNOSIS — M19.011 PRIMARY OSTEOARTHRITIS OF BOTH SHOULDERS: ICD-10-CM

## 2024-05-03 DIAGNOSIS — M19.012 PRIMARY OSTEOARTHRITIS OF LEFT SHOULDER: Primary | ICD-10-CM

## 2024-05-03 DIAGNOSIS — M19.012 PRIMARY OSTEOARTHRITIS OF BOTH SHOULDERS: ICD-10-CM

## 2024-05-03 DIAGNOSIS — Z01.818 PREOP EXAMINATION: ICD-10-CM

## 2024-05-03 PROCEDURE — 99214 OFFICE O/P EST MOD 30 MIN: CPT | Performed by: NURSE PRACTITIONER

## 2024-05-03 PROCEDURE — L3670 SO ACRO/CLAV CAN WEB PRE OTS: HCPCS | Performed by: NURSE PRACTITIONER

## 2024-05-03 PROCEDURE — 1159F MED LIST DOCD IN RCRD: CPT | Performed by: NURSE PRACTITIONER

## 2024-05-03 PROCEDURE — 73030 X-RAY EXAM OF SHOULDER: CPT | Mod: 50

## 2024-05-03 PROCEDURE — 1160F RVW MEDS BY RX/DR IN RCRD: CPT | Performed by: NURSE PRACTITIONER

## 2024-05-03 PROCEDURE — 73030 X-RAY EXAM OF SHOULDER: CPT | Mod: BILATERAL PROCEDURE | Performed by: RADIOLOGY

## 2024-05-03 PROCEDURE — 4010F ACE/ARB THERAPY RXD/TAKEN: CPT | Performed by: NURSE PRACTITIONER

## 2024-05-03 PROCEDURE — 1125F AMNT PAIN NOTED PAIN PRSNT: CPT | Performed by: NURSE PRACTITIONER

## 2024-05-03 ASSESSMENT — PAIN - FUNCTIONAL ASSESSMENT: PAIN_FUNCTIONAL_ASSESSMENT: 0-10

## 2024-05-03 ASSESSMENT — PAIN SCALES - GENERAL: PAINLEVEL_OUTOF10: 8

## 2024-05-03 ASSESSMENT — PAIN DESCRIPTION - DESCRIPTORS: DESCRIPTORS: SHARP

## 2024-05-03 NOTE — PROGRESS NOTES
Provider Impression/Assessment:  Mary See has end-stage arthritis of left shoulder with history of rotator cuff insufficiency. +2 years status post right reverse shoulder replacement.       Patient Discussion/Plan:  At this time the patient has failed conservative management of activity modifications, exercises, anti-inflammatories and injections for her left shoulder. The risks, benefits and alternatives of a shoulder replacement were discussed at length with Dr Nix previously. These were reviewed with the patient today. They understand that this is a salvage procedure getting a 70-75% of the normal shoulder. The primary reason to perform this procedure is for pain. Function is a secondary gain. The risks of surgery are infection, dislocation, nerve injury, blood loss. The benefits are pain relief and restoration of function. The patient verbalize an understanding of the risks benefits alternatives and the expected outcomes and wishes to proceed with elective surgery. Patient was given a handout today on reverse shoulder replacement.     The PLAN will be for LEFT reverse total shoulder replacement, pending final CT review.     The rehabilitation after surgery was discussed at length. It would be approximately 2-4 weeks in a sling with activities as tolerated at the waist level. Following this they will start stretching and range of motion exercises. We anticipate they will make a near full recovery around 3 months, but with continued improvement up to a year after surgery. We also discussed the hospital course. Usually patients stay one night but sometimes they are able to leave same day. We will plan on patient leaving same day as they have good family support for immediate post operative care following surgery to assist at home.     Patient was offered a follow up visit with Dr Nix in clinic prior to surgery. She declined needing another visit with our team before surgery in July. She verbalizes  understanding that she can see us back in clinic anytime prior to surgery as needed.     We have scheduled Mary See for reverse total shoulder replacement on 7/26/24 to take place at [facility]. Preoperative CT scan will need to be completed for pre-operative planning with Blueprint protocol prior to surgery date to be sure that we did not miss anything from a structural standpoint. Patient was fitted for their post operative sling today. She already has a cold therapy machine. She is aware that they need to be medically cleared by the surgical anesthesia team prior to surgery.    All questions were answered to the patient's satisfaction and they are comfortable with the above plan. Patient was discussed with Dr Nix and he is in agreement as well with the surgical plan.     Should you have any questions or concerns after your visit today, please do not hesitate to call the office at [phone]. I am honored to be a provider on your health care team and remain dedicated to helping you achieve your healthcare goals.  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 She is well known to our office and is presenting in clinic today with left shoulder pain. She is over 2 years S/P right reverse total shoulder replacement, done 1/28/22. She is thrilled with her right shoulder since replacement over 2 years ago. She has returned to everything she wants to do with her right shoulder at this point. XR of both shoulders today. Mary has had left shoulder pain for several years now. She is unable to perform most ADLs due to the daily pain. Mary reports decreased shoulder strength and ROM. She denies any new trauma to either shoulder since her last visit. Mary is interested in scheduling surgery for her left shoulder. She  continues to use a cane for balance. She has had injections for her left shoulder and worked with physical therapy in the past. Nothing has helped him. She is not sleeping due to the left shoulder pain. Alternating with Tylenol and Naproxen, without much relief now. She does report pain that radiates to her neck, but not passed her elbow. Her goal is to return to [id number] playing after surgery.    Status post left radial head fracture and rotator cuff tear.     Intolerance to surgical glue and steris strips. Has used Benadryl for symptoms  Smoking Status: Non-smoker  Diabetic: Denies  BMI: 41    Review of Systems:   Review of systems for all 14 systems is negative for complaint except for the above noted findings in the history of present illness.    Family, social, and medical histories are obtained and reviewed.    Allergies:  Allergies   Allergen Reactions    Ibuprofen Shortness of breath     GI UPSET    Gluten Unknown     bloating,pain ,gas and diarrrhea    Adhesive Tape-Silicones Unknown and Other     incision glue    Lactose Other    Miconazole Hives    Other Unknown and Rash     Dial soap    Soap Rash     Dial soap       Past Medical History:   Diagnosis Date    Carpal tunnel syndrome, bilateral upper limbs 2020    Severe carpal tunnel syndrome of both wrists    Inadequate sleep hygiene 10/01/2018    Inadequate sleep hygiene    Jaw pain 2018    Jaw pain    Other conditions influencing health status 2018    Knee Replacement    Other specified postprocedural states 10/01/2018    History of hip surgery       Past Surgical History:   Procedure Laterality Date    ANKLE SURGERY  10/01/2018    Ankle Surgery    CATARACT EXTRACTION  10/01/2018    Cataract Surgery     SECTION, CLASSIC  10/01/2018     Section    HERNIA REPAIR  10/01/2018    Inguinal Hernia Repair    OTHER SURGICAL HISTORY  2021    Tracheostomy    OTHER SURGICAL HISTORY  10/01/2018    Corneal LASIK Bilateral     SHOULDER SURGERY Right     TONSILLECTOMY  10/01/2018    Tonsillectomy    TOTAL HIP ARTHROPLASTY  10/01/2018    Total Hip Replacement    TOTAL KNEE ARTHROPLASTY  10/01/2018    Knee Replacement       Social History     Socioeconomic History    Marital status:      Spouse name: Not on file    Number of children: Not on file    Years of education: Not on file    Highest education level: Not on file   Occupational History    Not on file   Tobacco Use    Smoking status: Never    Smokeless tobacco: Never   Vaping Use    Vaping status: Never Used   Substance and Sexual Activity    Alcohol use: Never    Drug use: Never    Sexual activity: Not on file   Other Topics Concern    Not on file   Social History Narrative    Not on file     Social Determinants of Health     Financial Resource Strain: Not on file   Food Insecurity: Not on file   Transportation Needs: Not on file   Physical Activity: Not on file   Stress: Not on file   Social Connections: Not on file   Intimate Partner Violence: Not on file   Housing Stability: Not on file       Medications:    Current Outpatient Medications:     acetaminophen (Tylenol Extra Strength) 500 mg tablet, Take by mouth., Disp: , Rfl:     albuterol 2.5 mg /3 mL (0.083 %) nebulizer solution, Take 3 mL (2.5 mg) by nebulization every 8 hours if needed., Disp: , Rfl:     albuterol 90 mcg/actuation inhaler, USE 2 INHALATIONS BY MOUTH  EVERY 6 HOURS AS NEEDED FOR SHORTNESS OF BREATH, Disp: 34 g, Rfl: 3    ALPRAZolam XR (Xanax XR) 0.5 mg 24 hr tablet, Take 1 tablet (0.5 mg) by mouth once daily., Disp: 90 tablet, Rfl: 0    ascorbic acid (VITAMIN C ORAL), Take 1 tablet by mouth once daily., Disp: , Rfl:     ascorbic acid, vitamin C, 1,000 mg ER tablet, Take 1 tablet (1,000 mg) by mouth once daily., Disp: , Rfl:     aspirin (Adult Low Dose Aspirin) 81 mg EC tablet, 1 tablet 2 TIMES DAILY (route: oral), Disp: , Rfl:     calcium carbonate-vitamin D3 600 mg-20 mcg (800 unit) tablet, Take 1 tablet  by mouth 2 times a day., Disp: , Rfl:     carboxymethylcellulose-glycern 0.5-0.9 % drops, Use 1-2 Drops in both eyes as needed., Disp: , Rfl:     cetirizine (ZyrTEC) 10 mg tablet, Take 1 tablet (10 mg) by mouth once daily., Disp: , Rfl:     cholecalciferol (Vitamin D-3) 125 MCG (5000 UT) capsule, Take by mouth., Disp: , Rfl:     citalopram (CeleXA) 40 mg tablet, Take 0.5 tablets (20 mg) by mouth once daily., Disp: 15 tablet, Rfl: 1    clobetasol (Temovate) 0.05 % cream, APPLY TO AFFECTED AREA(S) TOPICALLY ONCE DAILY FOR 1 WEEK, THEN TWICE WEEKLY FOR MAINTENANCE., Disp: , Rfl:     DULoxetine (Cymbalta) 30 mg DR capsule, Take 1 capsule (30 mg) by mouth once daily for 7 days, THEN 2 capsules (60 mg) once daily for 21 days. Do not crush or chew.., Disp: 49 capsule, Rfl: 2    estradiol (Estrace) 0.1 MG/GM vaginal cream, Insert into the vagina., Disp: , Rfl:     fluticasone (Flonase) 50 mcg/actuation nasal spray, Administer 2 sprays into each nostril once daily., Disp: 48 g, Rfl: 3    fluticasone (Flovent) 110 mcg/actuation inhaler, Inhale., Disp: , Rfl:     furosemide (Lasix) 20 mg tablet, Take 1 tablet (20 mg) by mouth once daily as needed (edema)., Disp: 90 tablet, Rfl: 3    gabapentin (Neurontin) 300 mg capsule, TAKE 3 CAPSULES BY MOUTH 3 TIMES DAILY, Disp: 810 capsule, Rfl: 3    glucosamine-chondroit-vit C-Mn (Glucosamine Chondroitin MaxStr) 500-400 mg capsule, Take 1,500 mg by mouth., Disp: , Rfl:     glucosamine/chondr wray A sod (glucosamine-chondroitin) 750-600 mg tablet tablet, 2 tablet DAILY (route: oral), Disp: , Rfl:     guaiFENesin (Mucinex) 1,200 mg tablet extended release 12hr, Take 1 tablet (1,200 mg) by mouth every 12 hours. FOR CONGESTION, Disp: , Rfl:     ketoconazole (NIZOral) 2 % cream, Apply topically twice a day., Disp: , Rfl:     krill/om3/dha/epa/om6/lip/astx (KRILL OIL, OMEGA 3 AND 6, ORAL), Take by mouth., Disp: , Rfl:     levothyroxine (Synthroid, Levoxyl) 150 mcg tablet, Take 1 tablet (150 mcg)  by mouth once daily., Disp: 90 tablet, Rfl: 3    losartan (Cozaar) 100 mg tablet, Take 1 tablet (100 mg) by mouth once daily., Disp: , Rfl:     losartan (Cozaar) 50 mg tablet, TAKE 1 TABLET BY MOUTH ONCE  DAILY, Disp: 90 tablet, Rfl: 3    magnesium oxide-Mg AA chelate (Magnesium, oxide/AA chelate,) 300 mg capsule, Take 1 capsule (300 mg) by mouth once daily., Disp: , Rfl:     melatonin 3 mg tablet, 2 tablet BEDTIME (route: oral), Disp: , Rfl:     montelukast (Singulair) 10 mg tablet, TAKE 1 TABLET BY MOUTH DAILY AS  DIRECTED, Disp: 90 tablet, Rfl: 3    multivitamin with minerals (multivit-min-iron fum-folic ac) tablet, Take 1 tablet by mouth once daily., Disp: , Rfl:     naproxen (Naprosyn) 500 mg tablet, TAKE 1 TABLET BY MOUTH TWICE  DAILY WITH FOOD, Disp: 180 tablet, Rfl: 3    nystatin (Mycostatin) 100,000 unit/gram powder, APPLY 2-3 TIMES DAILY TO AFFECTED AREA(S).  As needed., Disp: , Rfl:     omega 3-dha-epa-fish oil (Fish OiL) 1,200 (144-216) mg capsule, Take 1 capsule (1,200 mg) by mouth once daily., Disp: , Rfl:     omeprazole (PriLOSEC) 20 mg DR capsule, Take by mouth., Disp: , Rfl:     omeprazole (PriLOSEC) 40 mg DR capsule, Take 1 capsule (40 mg) by mouth 2 times a day., Disp: 180 capsule, Rfl: 3    pravastatin (Pravachol) 40 mg tablet, TAKE 1 TABLET BY MOUTH DAILY, Disp: 90 tablet, Rfl: 3    simethicone (Mylicon) 80 mg chewable tablet, Chew., Disp: , Rfl:     triamterene-hydrochlorothiazid (Maxzide-25) 37.5-25 mg tablet, TAKE 1 TABLET BY MOUTH DAILY, Disp: 90 tablet, Rfl: 3    vitamin E 180 mg (400 unit) capsule, Take one(1) capsule twice daily., Disp: , Rfl:     Wixela Inhub 250-50 mcg/dose diskus inhaler, Inhale 1 puff every 12 hours., Disp: 180 each, Rfl: 3    zinc sulfate (Zincate) 220 (50 Zn) MG capsule, Take 1 capsule (50 mg of elemental zinc) by mouth once daily., Disp: , Rfl:     Family History:  Family History   Problem Relation Name Age of Onset    Cancer Mother          ADRENAL GLAND;  DIGESTIVE ORGAN    Other (ANXIETY AND DEPRESSION) Mother      Alcohol abuse Father      Liver cancer Father      Ovarian cancer Sister      Dilated cardiomyopathy Sister          NONISCHEMIC       Diagnoses/Problems:  Left shoulder arthritis    Physical Examination:  Mary See is a well-developed well-nourished female in no acute distress. Breathes with normal chest rises. Eye exam reveals round pupils. Awake alert and oriented x3.     Examination of right shoulder reveals well healed incision. Active range of motion 160° of forward elevation. 60° of external rotation. Internal rotation was to T12. Cross arm adduction without pain. Can reach opposite axilla.     Examination of left shoulder reveals skin is intact. No edema. No ecchymosis. No erythema. Active forward elevation to 80°, passively correctable with pain. External rotation to 10°, fixed. Internally rotates to buttock. No pain over acromioclavicular joint. Neurovascular intact distally.     Results/Imaging:  X-ray today of left shoulder shows severe end-stage arthritis. No acute fractures. No dislocation.     X-ray today of right shoulder shows excellent alignment of reverse shoulder replacement. No signs of lucency, dislocation or acute findings. I personally spent time reviewing digital images of the radiology testing with the patient today.     Medical Equipment:  Patient was prescribed a shoulder sling for postoperative care. The patient will have weakness, instability and/or deformity of their LEFT arm which requires stabilization from this orthosis to improve their function after surgery.     Verbal and written instructions for the use, wear schedule, cleaning and application of this item were given. Patient was instructed that should the brace result in increased pain, decreased sensation, increased swelling, or an overall worsening of their medical condition, to please contact our office immediately at [phone].     Orthotic management and  training was provided for skin care, modifications due to healing tissues, edema changes, interruption in skin integrity, and safety precautions with the orthosis.      *While intending to generate a timely document that accurately reflects the content of the visit, no guarantee can be provided that every grammatical or spelling mistake has been or will be identified or corrected. Thank you for your understanding.

## 2024-05-03 NOTE — PROGRESS NOTES
"Chief complaint: Bilateral foot pain, follow-up    This is a pleasant 74 y.o. right handed woman with past medical history significant for HTN, HLD, hypothyroidism, diabetes, IBS, depression, anxiety, fibromyalgia, migraines, asthma, MALIHA, tracheocutaneous fistula following tracheostomy who presents for follow-up of bilateral foot pain.    She was last seen here on 3/20/2024, at which point I advised her to try Voltaren gel. This helps    I advised her to call the gait lab. She got the gait  shoe attachments about 3 weeks ago. Feels like they help her walk better. She is using her orthotics, voltaren, and exercises and its all helps. Can walk a bit longer now before they start hurting, feels more movement in her ligaments and her callous is softening.      Advised to continue her exercises. She is doing this, helps.     I advised her to proceed with her appointment with Dr. Petersen and she saw him on 4/11/2024, note personally reviewed today.  He wanted to try the gait  shoe before considering other orthotics or surgical intervention.  Surgical options would include a significant realignment arthrodesis possibly involving the ankle.    L shoulder replacement 7/26/24 coming up.     She is interested in trying cymbalta.    She rates her pain as    Otherwise, there have been no changes to her medications or past medical history since the last visit    _____________________________________  5/6/2024: wean down on citalopram, start Cymbalta, Continue voltaren gel as needed, continue HEP, orthotics. Consider midfoot injections.     ____________________________________  As reminder:    TIMELINE OF COMPLAINT(S):    She says that she has had many years of pain, but flatfeet \"all my life\".  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  She has had various different orthotics over the past 30 years, and her arch just kept getting " worse bilaterally.  At 1 point a podiatrist put her into AFOs, but these hurt too much.  She recently got custom made orthotics from  which helped with her balance, but now with her pain.  No orthotics have ever helped with her pain, but help help with balance, stability, falling less.    She does fall, previously more frequently, either due to mechanical reasons or she can clear her toes and then falls forward.    Pain was present intermittnetly for many decades. Really bad in 50's. Worse with standing and walking 15 min , gets worse with time    Both knees, hips, R shoulder replaced, all helped.     She does have chronic low back pain with episodes of bilateral sciatica, 2011 and 2013  back injections helped.  She does not feel that the back pain in the foot pain is connected at all.    She has an appointment scheduled with Dr. Petersen coming up.    Pain:  LOCATION- Bilateral plantar foot, usually L>R starts at distal heel and spreads on plantar surface  RADIATION-  CONSTANT or INTERMITTENT- Intermittent   SEVERITY/QUANTITY- 8  QUALITY- burning and sharp, gnawing  WEAKNESS- No  NUMBNESS/TINGLING- Feet  ASSOCIATED WITH-used to fall a lot more before recent orthotics, toe would snag or drag. Last major fall (hit head) 8/23.  EXACERBATED BY- Standing, walking  BETTER WITH- Sitting-feet up  TRIED- Tylenol takes edge off , CBD gel helped shoulder not feet      Anti-Inflammatories: Naproxen helps other pain, but not feet      Muscle relaxants:      Anti-depressants:      Neuroleptics: Gabapentin 900 mg 2-3 times daily helps      LDN:    PHYSICAL THERAPY: Yes, Fall 2023 for balance and feet pain, didn't help. No HEP  CHIROPRACTIC MANIPULATION: Yes  TENS unit: No  ACUPUNCTURE TREATMENTS: Yes, not for this  DEEP TISSUE MASSAGE THERAPY:  No  OSTEOPATHIC MANIPULATION THERAPY: No    EMG/NCS: No    INJECTIONS: only injections in the back, not feet    IMAGING: Yes    === 06/14/18 ===    MRI CERVICAL SPINE WO CONTRAST    -  Impression -  *Cervical spondylosis as described  [place] [facility] [place]    === 11/03/21 ===    CT 3D RECONSTRUCTION    - Impression -  1. Severe glenohumeral and acromioclavicular joint degenerative  change.  2. Rotator cuff muscle atrophy, moderate in the infraspinatus muscle.    === 12/18/23 ===    XR FOOT 3+ VIEWS RIGHT    - Impression -  Moderate degenerative change about the midfoot with pes planus  deformity. Findings may represent early neuropathic arthropathy  (Charcot foot)    Left foot xray 12/18/23  IMPRESSION:  Destructive arthropathy in the midfoot compatible with neuropathic  arthropathy (Charcot foot).            Lab Results   Component Value Date    HGBA1C 5.2 11/23/2021       FUNCTIONAL HISTORY: The patient is independent in all ADLs, mobility, and driving. The patient uses a cane for outside the home. Metatarsal pads help    SH:  Lives in: [place]  Lives with:   Occupation: Retired  Tobacco: Former,, in the 1970's on and off 7 years  Alcohol: Occasionally  Drugs: No    ______________________________    ROS: The patient denies any bowel or bladder incontinence/accidents, night sweats, fevers, chills, recent significant weight loss. A 14 point review of systems was reviewed with the patient and is as above and otherwise negative.  ROS questionnaire positive for balance, difficulty walking, muscle pain/tightness, depression and anxiety, limited range of motion        PHYSICAL EXAM    GEN - Alert, well-developed, well-nourished, no acute distress  PSYCH - Cooperative, appropriate mood and affect  HEENT - NC/AT  RESP - Non-labored respirations, equal expansion  CV - warm and well-perfused, No cyanosis or edema in extremities.   ABD- soft, ND, obese  SKIN - No rash.    Previous:    Severe pes planus bilaterally, multiple calluses in the plantar aspect of the feet, mild tenderness to palpation in the midfoot metacarpals bilaterally, no tenderness elsewhere.  Full range of motion  passively, she had some difficulty with EHL and ankle dorsiflexion active movement.    NEURO -   LE strength 5/5 -  including hip flexors (4/5 bilaterally), knee flexors, knee extensors, ankle dorsiflexors (4/5 bilaterally), ankle plantar flexors, and EHL (2/5 on the right, 5/5 on the left)  Sensation - intact to light touch in bilateral lower extremities.   Reflexes -absent patellar and Achilles reflexes bilaterally No Clonus  GAIT -wide base, shortened stride length, waddling gait, glue medius weakness, antalgic, bilateral foot out-toeing and bilateral pes planus.      IMPRESSION:    This is a pleasant 74 y.o. right handed woman with past medical history significant for HTN, HLD, hypothyroidism, diabetes, IBS, depression, anxiety, fibromyalgia, migraines, asthma, MALIHA, tracheocutaneous fistula following tracheostomy who presents for follow-up of bilateral foot pain.  Physical exam is notable for bilateral foot dorsiflexion weakness and right EHL weakness, likely stemming from her chronic lumbar radiculopathy.  Symptoms and physical exam findings in this complex patient are undoubtably multifactorial in nature but due to combination of moderate to severe degenerative changes of the midfoot, exacerbated by pes planus, bilateral lower extremity weakness, abnormal gait biomechanics, possibly contribution from spinal stenosis/radiculopathy.        -Wean down on citalopram to 20 mg daily  -Start Cymbalta according to up titration instructions. The medication mechanism, side effects as well as the risks, benefits, and alternatives were discussed. Goals of therapy discussed. The patient expressed understanding of this.    -Continue Voltaren gel on the area of pain 4 times per day as needed  -Consider other medications in the future  -Consider ultrasound-guided midfoot injections  -The most important is to resume daily strengthening exercises, some exercises provided  -Follow-up 3 months      The patient expressed  understanding and agreement with the assessment and plan. Patient encouraged to contact us should they have any questions, concerns, or any changes in symptoms.     Thank you for allowing me to participate in the care of your patient.      ** Dictated with voice recognition software, please forgive any errors in grammar and/or spelling **

## 2024-05-03 NOTE — PATIENT INSTRUCTIONS
-Wean down on citalopram to 20 mg daily  -Start Cymbalta according to up titration instructions.  -Continue Voltaren gel on the area of pain 4 times per day as needed  -Consider other medications in the future  -Consider ultrasound-guided midfoot injections  -The most important is to resume daily strengthening exercises, some exercises provided  -Follow-up 3 months      GOOD LUCK WITH SHOULDER SURGERY!

## 2024-05-06 ENCOUNTER — OFFICE VISIT (OUTPATIENT)
Dept: PHYSICAL MEDICINE AND REHAB | Facility: CLINIC | Age: 74
End: 2024-05-06
Payer: MEDICARE

## 2024-05-06 VITALS
SYSTOLIC BLOOD PRESSURE: 118 MMHG | TEMPERATURE: 96.6 F | HEIGHT: 63 IN | WEIGHT: 234 LBS | BODY MASS INDEX: 41.46 KG/M2 | DIASTOLIC BLOOD PRESSURE: 71 MMHG | RESPIRATION RATE: 16 BRPM | HEART RATE: 60 BPM

## 2024-05-06 DIAGNOSIS — G89.29 CHRONIC RIGHT-SIDED LOW BACK PAIN WITHOUT SCIATICA: ICD-10-CM

## 2024-05-06 DIAGNOSIS — M14.671 CHARCOT'S JOINT OF RIGHT FOOT: ICD-10-CM

## 2024-05-06 DIAGNOSIS — M25.551 RIGHT HIP PAIN: ICD-10-CM

## 2024-05-06 DIAGNOSIS — R29.898 WEAKNESS OF BOTH LOWER EXTREMITIES: Primary | ICD-10-CM

## 2024-05-06 DIAGNOSIS — M70.61 TROCHANTERIC BURSITIS OF RIGHT HIP: ICD-10-CM

## 2024-05-06 DIAGNOSIS — M21.41 PES PLANUS OF BOTH FEET: ICD-10-CM

## 2024-05-06 DIAGNOSIS — M19.071 PRIMARY OSTEOARTHRITIS OF BOTH FEET: ICD-10-CM

## 2024-05-06 DIAGNOSIS — M53.3 SACROILIAC JOINT DYSFUNCTION OF RIGHT SIDE: ICD-10-CM

## 2024-05-06 DIAGNOSIS — R45.89 DEPRESSED MOOD: ICD-10-CM

## 2024-05-06 DIAGNOSIS — M21.42 PES PLANUS OF BOTH FEET: ICD-10-CM

## 2024-05-06 DIAGNOSIS — M14.672 CHARCOT'S JOINT OF LEFT FOOT: ICD-10-CM

## 2024-05-06 DIAGNOSIS — M54.50 CHRONIC RIGHT-SIDED LOW BACK PAIN WITHOUT SCIATICA: ICD-10-CM

## 2024-05-06 DIAGNOSIS — M79.18 MYOFASCIAL PAIN: ICD-10-CM

## 2024-05-06 DIAGNOSIS — M19.072 PRIMARY OSTEOARTHRITIS OF BOTH FEET: ICD-10-CM

## 2024-05-06 PROCEDURE — 99214 OFFICE O/P EST MOD 30 MIN: CPT | Performed by: PHYSICAL MEDICINE & REHABILITATION

## 2024-05-06 PROCEDURE — 3074F SYST BP LT 130 MM HG: CPT | Performed by: PHYSICAL MEDICINE & REHABILITATION

## 2024-05-06 PROCEDURE — 1159F MED LIST DOCD IN RCRD: CPT | Performed by: PHYSICAL MEDICINE & REHABILITATION

## 2024-05-06 PROCEDURE — 1160F RVW MEDS BY RX/DR IN RCRD: CPT | Performed by: PHYSICAL MEDICINE & REHABILITATION

## 2024-05-06 PROCEDURE — 3078F DIAST BP <80 MM HG: CPT | Performed by: PHYSICAL MEDICINE & REHABILITATION

## 2024-05-06 PROCEDURE — 1125F AMNT PAIN NOTED PAIN PRSNT: CPT | Performed by: PHYSICAL MEDICINE & REHABILITATION

## 2024-05-06 PROCEDURE — 4010F ACE/ARB THERAPY RXD/TAKEN: CPT | Performed by: PHYSICAL MEDICINE & REHABILITATION

## 2024-05-06 RX ORDER — DULOXETIN HYDROCHLORIDE 30 MG/1
CAPSULE, DELAYED RELEASE ORAL DAILY
Qty: 49 CAPSULE | Refills: 2 | Status: SHIPPED | OUTPATIENT
Start: 2024-05-06 | End: 2024-05-28 | Stop reason: SINTOL

## 2024-05-06 RX ORDER — CITALOPRAM 40 MG/1
20 TABLET, FILM COATED ORAL DAILY
Qty: 15 TABLET | Refills: 1 | Status: SHIPPED | OUTPATIENT
Start: 2024-05-06 | End: 2024-07-05

## 2024-05-06 ASSESSMENT — PAIN SCALES - GENERAL: PAINLEVEL: 8

## 2024-05-17 ENCOUNTER — HOSPITAL ENCOUNTER (OUTPATIENT)
Dept: RADIOLOGY | Facility: CLINIC | Age: 74
Discharge: HOME | End: 2024-05-17
Payer: MEDICARE

## 2024-05-17 DIAGNOSIS — M19.012 PRIMARY OSTEOARTHRITIS OF LEFT SHOULDER: ICD-10-CM

## 2024-05-17 PROCEDURE — 73200 CT UPPER EXTREMITY W/O DYE: CPT | Mod: LT

## 2024-05-21 ENCOUNTER — APPOINTMENT (OUTPATIENT)
Dept: RADIOLOGY | Facility: CLINIC | Age: 74
End: 2024-05-21
Payer: MEDICARE

## 2024-05-28 ENCOUNTER — LAB (OUTPATIENT)
Dept: LAB | Facility: LAB | Age: 74
End: 2024-05-28
Payer: MEDICARE

## 2024-05-28 ENCOUNTER — OFFICE VISIT (OUTPATIENT)
Dept: PRIMARY CARE | Facility: CLINIC | Age: 74
End: 2024-05-28
Payer: MEDICARE

## 2024-05-28 VITALS
DIASTOLIC BLOOD PRESSURE: 83 MMHG | SYSTOLIC BLOOD PRESSURE: 132 MMHG | BODY MASS INDEX: 40.74 KG/M2 | WEIGHT: 230 LBS | TEMPERATURE: 98.3 F | HEART RATE: 78 BPM

## 2024-05-28 DIAGNOSIS — E66.01 MORBID (SEVERE) OBESITY DUE TO EXCESS CALORIES (MULTI): ICD-10-CM

## 2024-05-28 DIAGNOSIS — E66.01 MORBID (SEVERE) OBESITY DUE TO EXCESS CALORIES (MULTI): Primary | ICD-10-CM

## 2024-05-28 DIAGNOSIS — M51.36 DISC DEGENERATION, LUMBAR: ICD-10-CM

## 2024-05-28 DIAGNOSIS — E78.00 HYPERCHOLESTEROLEMIA: ICD-10-CM

## 2024-05-28 DIAGNOSIS — E03.9 HYPOTHYROIDISM, UNSPECIFIED TYPE: ICD-10-CM

## 2024-05-28 LAB
ALBUMIN SERPL BCP-MCNC: 4.2 G/DL (ref 3.4–5)
ALP SERPL-CCNC: 96 U/L (ref 33–136)
ALT SERPL W P-5'-P-CCNC: 15 U/L (ref 7–45)
ANION GAP SERPL CALC-SCNC: 14 MMOL/L (ref 10–20)
AST SERPL W P-5'-P-CCNC: 20 U/L (ref 9–39)
BASOPHILS # BLD AUTO: 0.05 X10*3/UL (ref 0–0.1)
BASOPHILS NFR BLD AUTO: 0.9 %
BILIRUB SERPL-MCNC: 0.5 MG/DL (ref 0–1.2)
BUN SERPL-MCNC: 17 MG/DL (ref 6–23)
CALCIUM SERPL-MCNC: 9.5 MG/DL (ref 8.6–10.6)
CHLORIDE SERPL-SCNC: 102 MMOL/L (ref 98–107)
CHOLEST SERPL-MCNC: 278 MG/DL (ref 0–199)
CHOLESTEROL/HDL RATIO: 4.1
CO2 SERPL-SCNC: 27 MMOL/L (ref 21–32)
CREAT SERPL-MCNC: 0.9 MG/DL (ref 0.5–1.05)
EGFRCR SERPLBLD CKD-EPI 2021: 67 ML/MIN/1.73M*2
EOSINOPHIL # BLD AUTO: 0.13 X10*3/UL (ref 0–0.4)
EOSINOPHIL NFR BLD AUTO: 2.2 %
ERYTHROCYTE [DISTWIDTH] IN BLOOD BY AUTOMATED COUNT: 12.9 % (ref 11.5–14.5)
GLUCOSE SERPL-MCNC: 89 MG/DL (ref 74–99)
HCT VFR BLD AUTO: 37.6 % (ref 36–46)
HDLC SERPL-MCNC: 67.6 MG/DL
HGB BLD-MCNC: 12.1 G/DL (ref 12–16)
IMM GRANULOCYTES # BLD AUTO: 0.03 X10*3/UL (ref 0–0.5)
IMM GRANULOCYTES NFR BLD AUTO: 0.5 % (ref 0–0.9)
LDLC SERPL CALC-MCNC: 169 MG/DL
LYMPHOCYTES # BLD AUTO: 1.56 X10*3/UL (ref 0.8–3)
LYMPHOCYTES NFR BLD AUTO: 26.9 %
MCH RBC QN AUTO: 28.8 PG (ref 26–34)
MCHC RBC AUTO-ENTMCNC: 32.2 G/DL (ref 32–36)
MCV RBC AUTO: 90 FL (ref 80–100)
MONOCYTES # BLD AUTO: 0.44 X10*3/UL (ref 0.05–0.8)
MONOCYTES NFR BLD AUTO: 7.6 %
NEUTROPHILS # BLD AUTO: 3.58 X10*3/UL (ref 1.6–5.5)
NEUTROPHILS NFR BLD AUTO: 61.9 %
NON HDL CHOLESTEROL: 210 MG/DL (ref 0–149)
NRBC BLD-RTO: 0 /100 WBCS (ref 0–0)
PLATELET # BLD AUTO: 219 X10*3/UL (ref 150–450)
POTASSIUM SERPL-SCNC: 4 MMOL/L (ref 3.5–5.3)
PROT SERPL-MCNC: 6.6 G/DL (ref 6.4–8.2)
RBC # BLD AUTO: 4.2 X10*6/UL (ref 4–5.2)
SODIUM SERPL-SCNC: 139 MMOL/L (ref 136–145)
TRIGL SERPL-MCNC: 205 MG/DL (ref 0–149)
TSH SERPL-ACNC: 1.3 MIU/L (ref 0.44–3.98)
VLDL: 41 MG/DL (ref 0–40)
WBC # BLD AUTO: 5.8 X10*3/UL (ref 4.4–11.3)

## 2024-05-28 PROCEDURE — 80061 LIPID PANEL: CPT

## 2024-05-28 PROCEDURE — 1160F RVW MEDS BY RX/DR IN RCRD: CPT | Performed by: INTERNAL MEDICINE

## 2024-05-28 PROCEDURE — 1036F TOBACCO NON-USER: CPT | Performed by: INTERNAL MEDICINE

## 2024-05-28 PROCEDURE — 36415 COLL VENOUS BLD VENIPUNCTURE: CPT

## 2024-05-28 PROCEDURE — 3079F DIAST BP 80-89 MM HG: CPT | Performed by: INTERNAL MEDICINE

## 2024-05-28 PROCEDURE — 4010F ACE/ARB THERAPY RXD/TAKEN: CPT | Performed by: INTERNAL MEDICINE

## 2024-05-28 PROCEDURE — 84443 ASSAY THYROID STIM HORMONE: CPT

## 2024-05-28 PROCEDURE — 1125F AMNT PAIN NOTED PAIN PRSNT: CPT | Performed by: INTERNAL MEDICINE

## 2024-05-28 PROCEDURE — 1159F MED LIST DOCD IN RCRD: CPT | Performed by: INTERNAL MEDICINE

## 2024-05-28 PROCEDURE — 99214 OFFICE O/P EST MOD 30 MIN: CPT | Performed by: INTERNAL MEDICINE

## 2024-05-28 PROCEDURE — 85025 COMPLETE CBC W/AUTO DIFF WBC: CPT

## 2024-05-28 PROCEDURE — 80053 COMPREHEN METABOLIC PANEL: CPT

## 2024-05-28 PROCEDURE — G2211 COMPLEX E/M VISIT ADD ON: HCPCS | Performed by: INTERNAL MEDICINE

## 2024-05-28 PROCEDURE — 3075F SYST BP GE 130 - 139MM HG: CPT | Performed by: INTERNAL MEDICINE

## 2024-05-28 RX ORDER — GABAPENTIN 300 MG/1
300 CAPSULE ORAL 2 TIMES DAILY
Qty: 810 CAPSULE | Refills: 3 | Status: SHIPPED | OUTPATIENT
Start: 2024-05-28

## 2024-05-28 ASSESSMENT — PAIN SCALES - GENERAL: PAINLEVEL: 2

## 2024-05-28 NOTE — PROGRESS NOTES
Subjective   Patient ID: Mary See is a 74 y.o. female who presents for Follow-up.  With regard to her severe foot pain, there has been a FAILURE of trial with duloxetine due to side effects. She is using Voltaren gel, which helps a little and has specific footwear modification which make gait easier. Still, three is significant impact on daily activities. She is not interested in surgery. Questions of neuromodulation have not been investigated, nor has pain pump. Her morbid obesity is a REAL ISSUE. She is having a R reverse shoulder replacement in July.  Aakash has CKD 3. She is driving him around d/t his bad back.    Very interested in Wegovy. Will sample Ozempic and get pharmacy involvement to see if covered.      Current Outpatient Medications   Medication Instructions    acetaminophen (Tylenol Extra Strength) 500 mg tablet oral    albuterol 90 mcg/actuation inhaler USE 2 INHALATIONS BY MOUTH  EVERY 6 HOURS AS NEEDED FOR SHORTNESS OF BREATH    albuterol 2.5 mg, nebulization, Every 8 hours PRN    ALPRAZolam XR (XANAX XR) 0.5 mg, oral, Daily    ascorbic acid (VITAMIN C ORAL) 1 tablet, oral, Daily    ascorbic acid, vitamin C, 1,000 mg ER tablet 1 tablet, oral, Daily    aspirin (Adult Low Dose Aspirin) 81 mg EC tablet 1 tablet 2 TIMES DAILY (route: oral)    calcium carbonate-vitamin D3 600 mg-20 mcg (800 unit) tablet 1 tablet, oral, 2 times daily    carboxymethylcellulose-glycern 0.5-0.9 % drops Use 1-2 Drops in both eyes as needed.    cetirizine (ZyrTEC) 10 mg tablet 1 tablet, oral, Daily    cholecalciferol (Vitamin D-3) 125 MCG (5000 UT) capsule oral    citalopram (CELEXA) 20 mg, oral, Daily    clobetasol (Temovate) 0.05 % cream APPLY TO AFFECTED AREA(S) TOPICALLY ONCE DAILY FOR 1 WEEK, THEN TWICE WEEKLY FOR MAINTENANCE.    estradiol (Estrace) 0.1 MG/GM vaginal cream vaginal    fluticasone (Flonase) 50 mcg/actuation nasal spray 2 sprays, Each Nostril, Daily    fluticasone (Flovent) 110 mcg/actuation inhaler  inhalation    furosemide (LASIX) 20 mg, oral, Daily PRN    gabapentin (NEURONTIN) 300 mg, oral, 2 times daily    glucosamine-chondroit-vit C-Mn (Glucosamine Chondroitin MaxStr) 500-400 mg capsule 1,500 mg, oral    glucosamine/chondr wray A sod (glucosamine-chondroitin) 750-600 mg tablet tablet 2 tablet DAILY (route: oral)    guaiFENesin (Mucinex) 1,200 mg tablet extended release 12hr 1 tablet, oral, Every 12 hours, FOR CONGESTION    ketoconazole (NIZOral) 2 % cream Topical, 2 times daily    krill/om3/dha/epa/om6/lip/astx (KRILL OIL, OMEGA 3 AND 6, ORAL) oral    levothyroxine (SYNTHROID, LEVOXYL) 150 mcg, oral, Daily    losartan (Cozaar) 100 mg tablet 1 tablet, oral, Daily    losartan (COZAAR) 50 mg, oral, Daily    magnesium oxide-Mg AA chelate (Magnesium, oxide/AA chelate,) 300 mg capsule 1 capsule, oral, Daily    melatonin 3 mg tablet 2 tablet BEDTIME (route: oral)    montelukast (SINGULAIR) 10 mg, oral, Daily, as directed    multivitamin with minerals (multivit-min-iron fum-folic ac) tablet 1 tablet, oral, Daily    naproxen (NAPROSYN) 500 mg, oral, 2 times daily (morning and late afternoon)    nystatin (Mycostatin) 100,000 unit/gram powder APPLY 2-3 TIMES DAILY TO AFFECTED AREA(S).  As needed.    omega 3-dha-epa-fish oil (Fish OiL) 1,200 (144-216) mg capsule 1 capsule, oral, Daily    omeprazole (PriLOSEC) 20 mg DR capsule oral    omeprazole (PRILOSEC) 40 mg, oral, 2 times daily    simethicone (Mylicon) 80 mg chewable tablet oral    triamterene-hydrochlorothiazid (Maxzide-25) 37.5-25 mg tablet 1 tablet, oral, Daily    vitamin E 180 mg (400 unit) capsule Take one(1) capsule twice daily.    Wixela Inhub 250-50 mcg/dose diskus inhaler 1 puff, inhalation, Every 12 hours    zinc sulfate (Zincate) 220 (50 Zn) MG capsule 1 capsule, oral, Daily     Review of Systems  All other systems are reviewed and are without complaint.    Objective   /83 (BP Location: Left arm, Patient Position: Sitting, BP Cuff Size: Adult)    Pulse 78   Temp 36.8 °C (98.3 °F) (Oral)   Wt 104 kg (230 lb)   BMI 40.74 kg/m²   Physical Exam  >50% of today's visit was counseling and coordination of care.      Assessment/Plan   Problem List Items Addressed This Visit             ICD-10-CM    Disc degeneration, lumbar M51.36    Relevant Medications    gabapentin (Neurontin) 300 mg capsule    Hypercholesterolemia E78.00    Relevant Orders    CBC and Auto Differential    Comprehensive Metabolic Panel    Lipid Panel    Hypothyroidism E03.9    Relevant Orders    Thyroid Stimulating Hormone    Morbid (severe) obesity due to excess calories (Multi) - Primary E66.01     Semaglutide ramp. Sample given.         Relevant Medications    semaglutide 0.25 mg or 0.5 mg (2 mg/3 mL) pen injector (Start on 6/2/2024)    Other Relevant Orders    CBC and Auto Differential    Follow Up In [facility] Pharmacy

## 2024-05-29 DIAGNOSIS — I10 BENIGN ESSENTIAL HYPERTENSION: ICD-10-CM

## 2024-05-29 DIAGNOSIS — E78.00 HYPERCHOLESTEROLEMIA: Primary | ICD-10-CM

## 2024-05-29 DIAGNOSIS — E66.01 MORBID (SEVERE) OBESITY DUE TO EXCESS CALORIES (MULTI): ICD-10-CM

## 2024-05-29 NOTE — RESULT ENCOUNTER NOTE
I'm glad you are feeling better, but I have to note that your cholesterol is much higher off of a statin. There arenow great non-statin methods to lower cholesterol effectively which do not have musculoskeletal pain as a side effect. I would like you to go to the [facility] for a consultation with [facility] multidisciplinary team of cardiology, endocrinology, dietary professionals to adjust your treatment so that all targets can be achieved to reduce your cardiovascular risk.  I am placing a referral in the chart for you to get this appointment.    The remainder of your testing is satisfactory.  Do not hesitate to contact me if you have questions or concerns.    Sincerely,  Skip Sal M.D.

## 2024-06-11 ENCOUNTER — HOSPITAL ENCOUNTER (EMERGENCY)
Facility: HOSPITAL | Age: 74
Discharge: HOME | End: 2024-06-12
Attending: EMERGENCY MEDICINE
Payer: MEDICARE

## 2024-06-11 ENCOUNTER — APPOINTMENT (OUTPATIENT)
Dept: RADIOLOGY | Facility: HOSPITAL | Age: 74
End: 2024-06-11
Payer: MEDICARE

## 2024-06-11 DIAGNOSIS — S09.90XA CLOSED HEAD INJURY, INITIAL ENCOUNTER: ICD-10-CM
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
## 2024-06-11 PROCEDURE — 72125 CT NECK SPINE W/O DYE: CPT

## 2024-06-11 PROCEDURE — 70450 CT HEAD/BRAIN W/O DYE: CPT | Performed by: RADIOLOGY

## 2024-06-11 PROCEDURE — 70450 CT HEAD/BRAIN W/O DYE: CPT

## 2024-06-11 PROCEDURE — 99285 EMERGENCY DEPT VISIT HI MDM: CPT | Mod: 25

## 2024-06-11 PROCEDURE — 72125 CT NECK SPINE W/O DYE: CPT | Performed by: RADIOLOGY

## 2024-06-11 RX ORDER — ACETAMINOPHEN 325 MG/1
975 TABLET ORAL ONCE
Status: COMPLETED | OUTPATIENT
Start: 2024-06-11 | End: 2024-06-11

## 2024-06-11 ASSESSMENT — PAIN SCALES - GENERAL
PAINLEVEL_OUTOF10: 2
PAINLEVEL_OUTOF10: 2
PAINLEVEL_OUTOF10: 3

## 2024-06-11 ASSESSMENT — PAIN - FUNCTIONAL ASSESSMENT
PAIN_FUNCTIONAL_ASSESSMENT: 0-10
PAIN_FUNCTIONAL_ASSESSMENT: 0-10

## 2024-06-11 ASSESSMENT — PAIN DESCRIPTION - LOCATION: LOCATION: HEAD

## 2024-06-12 ENCOUNTER — APPOINTMENT (OUTPATIENT)
Dept: ORTHOPEDIC SURGERY | Facility: CLINIC | Age: 74
End: 2024-06-12
Payer: MEDICARE

## 2024-06-12 VITALS
HEART RATE: 66 BPM | DIASTOLIC BLOOD PRESSURE: 75 MMHG | WEIGHT: 220 LBS | TEMPERATURE: 98.1 F | HEIGHT: 63 IN | RESPIRATION RATE: 18 BRPM | OXYGEN SATURATION: 98 % | BODY MASS INDEX: 38.98 KG/M2 | SYSTOLIC BLOOD PRESSURE: 116 MMHG

## 2024-06-12 DIAGNOSIS — M21.42 PES PLANUS OF BOTH FEET: ICD-10-CM

## 2024-06-12 DIAGNOSIS — M21.41 PES PLANUS OF BOTH FEET: ICD-10-CM

## 2024-06-12 DIAGNOSIS — M14.672 CHARCOT'S JOINT OF LEFT FOOT: ICD-10-CM

## 2024-06-12 DIAGNOSIS — M19.072 PRIMARY OSTEOARTHRITIS OF BOTH FEET: ICD-10-CM

## 2024-06-12 DIAGNOSIS — M19.071 PRIMARY OSTEOARTHRITIS OF BOTH FEET: ICD-10-CM

## 2024-06-12 DIAGNOSIS — M14.671 CHARCOT'S JOINT OF RIGHT FOOT: ICD-10-CM

## 2024-06-12 NOTE — ED PROVIDER NOTES
HPI   Chief Complaint   Patient presents with    Fall     Patient lost balance in garage ; hit back of head; no LOC; no thinners; patient c/o headache; A&O x4;  patient has goose egg on back of head       Patient presents after a fall today.  She is apparently supposed be using cane or walker.  She has not fallen for over a year.  She was driving in her garbage cans try to get in the door and then fell backwards hit the back of her head.  She denies any loss consciousness.  She denies any injury to her extremities or torso.  Has no neck or back pain.                          No data recorded                   Patient History   Past Medical History:   Diagnosis Date    Carpal tunnel syndrome, bilateral upper limbs 2020    Severe carpal tunnel syndrome of both wrists    Inadequate sleep hygiene 10/01/2018    Inadequate sleep hygiene    Jaw pain 2018    Jaw pain    Other conditions influencing health status 2018    Knee Replacement    Other specified postprocedural states 10/01/2018    History of hip surgery     Past Surgical History:   Procedure Laterality Date    ANKLE SURGERY  10/01/2018    Ankle Surgery    CATARACT EXTRACTION  10/01/2018    Cataract Surgery     SECTION, CLASSIC  10/01/2018     Section    HERNIA REPAIR  10/01/2018    Inguinal Hernia Repair    OTHER SURGICAL HISTORY  2021    Tracheostomy    OTHER SURGICAL HISTORY  10/01/2018    Corneal LASIK Bilateral    SHOULDER SURGERY Right     TONSILLECTOMY  10/01/2018    Tonsillectomy    TOTAL HIP ARTHROPLASTY  10/01/2018    Total Hip Replacement    TOTAL KNEE ARTHROPLASTY  10/01/2018    Knee Replacement     Family History   Problem Relation Name Age of Onset    Cancer Mother          ADRENAL GLAND; DIGESTIVE ORGAN    Other (ANXIETY AND DEPRESSION) Mother      Alcohol abuse Father      Liver cancer Father      Ovarian cancer Sister      Dilated cardiomyopathy Sister          NONISCHEMIC     Social History     Tobacco Use     Smoking status: Never     Passive exposure: Never    Smokeless tobacco: Never   Vaping Use    Vaping status: Never Used   Substance Use Topics    Alcohol use: Never    Drug use: Never       Physical Exam   ED Triage Vitals   Temp Pulse Resp BP   -- -- -- --      SpO2 Temp src Heart Rate Source Patient Position   -- -- -- --      BP Location FiO2 (%)     -- --       Physical Exam  Vitals and nursing note reviewed.   Constitutional:       General: She is not in acute distress.     Appearance: She is well-developed.   HENT:      Head: Normocephalic.      Comments: Large 5 cm contusion at the posterior occiput somewhat right of center  Eyes:      Conjunctiva/sclera: Conjunctivae normal.   Cardiovascular:      Rate and Rhythm: Normal rate and regular rhythm.      Heart sounds: No murmur heard.  Pulmonary:      Effort: Pulmonary effort is normal. No respiratory distress.      Breath sounds: Normal breath sounds.   Abdominal:      Palpations: Abdomen is soft.      Tenderness: There is no abdominal tenderness.   Musculoskeletal:         General: No swelling.      Cervical back: Neck supple.   Skin:     General: Skin is warm and dry.      Capillary Refill: Capillary refill takes less than 2 seconds.   Neurological:      Mental Status: She is alert.   Psychiatric:         Mood and Affect: Mood normal.         ED Course & MDM   Diagnoses as of 06/13/24 0025   Fall, initial encounter   Closed head injury, initial encounter       Medical Decision Making  Patient is full range of motion of her neck.  No given her age and mechanism I do feel CT head neck is indicated.  Takes no blood thinners.  Has a large contusion on the back of the head.  No external bleeding.  CT is unremarkable.  Discharged home.    Procedure  Procedures     Skip Bell MD  06/13/24 0026

## 2024-06-17 ENCOUNTER — APPOINTMENT (OUTPATIENT)
Dept: PHARMACY | Facility: HOSPITAL | Age: 74
End: 2024-06-17
Payer: MEDICARE

## 2024-06-17 DIAGNOSIS — R45.89 DEPRESSED MOOD: ICD-10-CM

## 2024-06-17 DIAGNOSIS — E66.01 MORBID (SEVERE) OBESITY DUE TO EXCESS CALORIES (MULTI): ICD-10-CM

## 2024-06-17 RX ORDER — DOCUSATE SODIUM 100 MG/1
100 CAPSULE, LIQUID FILLED ORAL 2 TIMES DAILY PRN
COMMUNITY

## 2024-06-17 RX ORDER — CITALOPRAM 40 MG/1
40 TABLET, FILM COATED ORAL DAILY
Qty: 90 TABLET | Refills: 0 | OUTPATIENT
Start: 2024-06-17 | End: 2024-09-15

## 2024-06-17 NOTE — PROGRESS NOTES
Pharmacist Clinic: Weight Management    Mary See was referred to the Clinical Pharmacy Team for weight management.     Referring Provider: Skip Sal MD  Problem List Items Addressed This Visit       Depressed mood    Morbid (severe) obesity due to excess calories (Multi)    Relevant Medications    semaglutide 0.25 mg or 0.5 mg (2 mg/3 mL) pen injector (Start on 6/23/2024)    Other Relevant Orders    Follow Up In [facility] - Pharmacy       HISTORY OF PRESENT ILLNESS    Diet: coffee and water, meat, vegetables, fruit; avoids bread and pasta, intolerant of gluten    Exercise Routine: experiences pain in feet, issues walking.  active around the house.     Weight  220 lb (initial)  Has lost 24 pounds in first 4 weeks    PHARMACY ASSESSMENT  Pertinent PMH Review:  - PMH of Pancreatitis: No  - PMH of Retinopathy: No  - PMH of MTC: No    Allergies: Ibuprofen, Gluten, Adhesive tape-silicones, Lactose, Miconazole, Other, and Soap     [id number] [address] AT [place] [id number]  Phone: [phone] Fax: [phone]    [facility] - [facility] [address]  [address]  [facility] [address]  [facility] [address]  Phone: [phone] Fax: [phone]    Affordability/Accessibility: Ozempic covered by insurance plan, off label use    DRUG INTERACTIONS  - No significant drug-drug interactions exist that require adjustment to therapy    LAB  Lab Results   Component Value Date    BILITOT 0.5 05/28/2024    CALCIUM 9.5 05/28/2024    CO2 27 05/28/2024     05/28/2024    CREATININE 0.90 05/28/2024    GLUCOSE 89 05/28/2024    ALKPHOS 96 05/28/2024    K 4.0 05/28/2024    PROT 6.6 05/28/2024     05/28/2024    AST 20 05/28/2024    ALT 15 05/28/2024    BUN 17 05/28/2024    ANIONGAP 14 05/28/2024    PHOS 2.3 (L) 05/11/2021     05/08/2021    ALBUMIN 4.2 05/28/2024    EGFR 67 05/28/2024     Lab Results    Component Value Date    TRIG 205 (H) 05/28/2024    CHOL 278 (H) 05/28/2024    LDLCALC 169 (H) 05/28/2024    HDL 67.6 05/28/2024     Lab Results   Component Value Date    HGBA1C 5.2 11/23/2021       Current Outpatient Medications on File Prior to Visit   Medication Sig Dispense Refill    acetaminophen (Tylenol Extra Strength) 500 mg tablet Take by mouth.      albuterol 2.5 mg /3 mL (0.083 %) nebulizer solution Take 3 mL (2.5 mg) by nebulization every 8 hours if needed.      albuterol 90 mcg/actuation inhaler USE 2 INHALATIONS BY MOUTH  EVERY 6 HOURS AS NEEDED FOR SHORTNESS OF BREATH 34 g 3    ALPRAZolam XR (Xanax XR) 0.5 mg 24 hr tablet Take 1 tablet (0.5 mg) by mouth once daily. 90 tablet 0    ascorbic acid (VITAMIN C ORAL) Take 1 tablet by mouth once daily.      calcium carbonate-vitamin D3 600 mg-20 mcg (800 unit) tablet Take 1 tablet by mouth 2 times a day.      carboxymethylcellulose-glycern 0.5-0.9 % drops Use 1-2 Drops in both eyes as needed.      cetirizine (ZyrTEC) 10 mg tablet Take 1 tablet (10 mg) by mouth once daily.      cholecalciferol (Vitamin D-3) 125 MCG (5000 UT) capsule Take by mouth.      citalopram (CeleXA) 40 mg tablet Take 0.5 tablets (20 mg) by mouth once daily. 15 tablet 1    clobetasol (Temovate) 0.05 % cream APPLY TO AFFECTED AREA(S) TOPICALLY ONCE DAILY FOR 1 WEEK, THEN TWICE WEEKLY FOR MAINTENANCE.      estradiol (Estrace) 0.1 MG/GM vaginal cream Insert into the vagina.      fluticasone (Flonase) 50 mcg/actuation nasal spray Administer 2 sprays into each nostril once daily. 48 g 3    furosemide (Lasix) 20 mg tablet Take 1 tablet (20 mg) by mouth once daily as needed (edema). 90 tablet 3    gabapentin (Neurontin) 300 mg capsule Take 1 capsule (300 mg) by mouth 2 times a day. 810 capsule 3    glucosamine-chondroit-vit C-Mn (Glucosamine Chondroitin MaxStr) 500-400 mg capsule Take 1,500 mg by mouth.      guaiFENesin (Mucinex) 1,200 mg tablet extended release 12hr Take 1 tablet (1,200 mg) by  mouth every 12 hours. FOR CONGESTION      ketoconazole (NIZOral) 2 % cream Apply topically twice a day.      krill/om3/dha/epa/om6/lip/astx (KRILL OIL, OMEGA 3 AND 6, ORAL) Take by mouth.      levothyroxine (Synthroid, Levoxyl) 150 mcg tablet Take 1 tablet (150 mcg) by mouth once daily. 90 tablet 3    losartan (Cozaar) 50 mg tablet TAKE 1 TABLET BY MOUTH ONCE  DAILY 90 tablet 3    magnesium oxide-Mg AA chelate (Magnesium, oxide/AA chelate,) 300 mg capsule Take 1 capsule (300 mg) by mouth once daily.      melatonin 3 mg tablet Take 10 mg by mouth once daily at bedtime.      montelukast (Singulair) 10 mg tablet TAKE 1 TABLET BY MOUTH DAILY AS  DIRECTED 90 tablet 3    multivitamin with minerals (multivit-min-iron fum-folic ac) tablet Take 1 tablet by mouth once daily.      nystatin (Mycostatin) 100,000 unit/gram powder APPLY 2-3 TIMES DAILY TO AFFECTED AREA(S).  As needed.      omeprazole (PriLOSEC) 40 mg DR capsule Take 1 capsule (40 mg) by mouth 2 times a day. 180 capsule 3    semaglutide 0.25 mg or 0.5 mg (2 mg/3 mL) pen injector Inject 0.25 mg under the skin 1 (one) time per week for 28 days, THEN 0.5 mg 1 (one) time per week for 14 days. 2 mL 0    simethicone (Mylicon) 80 mg chewable tablet Chew.      triamterene-hydrochlorothiazid (Maxzide-25) 37.5-25 mg tablet TAKE 1 TABLET BY MOUTH DAILY 90 tablet 3    Wixela Inhub 250-50 mcg/dose diskus inhaler Inhale 1 puff every 12 hours. 180 each 3    zinc sulfate (Zincate) 220 (50 Zn) MG capsule Take 1 capsule (50 mg of elemental zinc) by mouth once daily.      [DISCONTINUED] naproxen (Naprosyn) 500 mg tablet TAKE 1 TABLET BY MOUTH TWICE  DAILY WITH FOOD 180 tablet 3    [DISCONTINUED] omeprazole (PriLOSEC) 20 mg DR capsule Take by mouth.      [DISCONTINUED] vitamin E 180 mg (400 unit) capsule Take one(1) capsule twice daily.      docusate sodium (Colace) 100 mg capsule Take 1 capsule (100 mg) by mouth 2 times a day as needed for constipation.      [DISCONTINUED] ascorbic  acid, vitamin C, 1,000 mg ER tablet Take 1 tablet (1,000 mg) by mouth once daily.      [DISCONTINUED] fluticasone (Flovent) 110 mcg/actuation inhaler Inhale.      [DISCONTINUED] glucosamine/chondr wray A sod (glucosamine-chondroitin) 750-600 mg tablet tablet 2 tablet DAILY (route: oral)      [DISCONTINUED] losartan (Cozaar) 100 mg tablet Take 1 tablet (100 mg) by mouth once daily.      [DISCONTINUED] omega 3-dha-epa-fish oil (Fish OiL) 1,200 (144-216) mg capsule Take 1 capsule (1,200 mg) by mouth once daily.       No current facility-administered medications on file prior to visit.       PATIENT EDUCATION/GOALS  - Counseled patient on MOA, expectations, side effects, duration of therapy, contraindications, administration, and monitoring parameters  - Answered all patient questions and concerns; provided [facility] phone number if issues/questions arise    RECOMMENDATIONS/PLAN  1. Weight Loss Plan: Increase Ozempic 0.5 mg subcutaneously once weekly  Patient has taken 3 injections of 0.25 mg dose, tolerating well.  Will have one more injection of 0.25 mg and then increase to 0.5 mg weekly.   Reports she experienced diarrhea after first dose but now experiences some constipation for which she is using Colace with relief.  She is experiencing appetite suppression.    -[facility] prescription to [facility] per patient request    Ozempic Education:     - Counseled patient on Ozempic MOA, expectations, side effects, duration of therapy, administration, and monitoring parameters.  - Provided detailed dosing and administration counseling to ensure proper technique.   - Reviewed Ozempic titration schedule, starting with 0.25 mg once weekly for 4 weeks, then continuing on 0.5 mg once weekly. Pt verbalized understanding.  - Advised patient that they may experience improved satiety after meals and portion sizes of meals may be reduced as doses of Ozempic increase.  - Counseled patient to avoid foods that are fatty/oily as this may precipitate  the nausea/GI upset that may occur with new start Ozempic.     Clinical Pharmacist follow up: 7/15/24 2 pm    Continue all meds under the continuation of care with the referring provider and clinical pharmacy team.    Stefanie Thacker, PharmD  Clinical Pharmacy Specialist, Primary Care   [phone]    Verbal consent to manage patient's drug therapy was obtained from patient. They were informed they may decline to participate or withdraw from participation in pharmacy services at any time.

## 2024-06-19 DIAGNOSIS — R45.89 DEPRESSED MOOD: ICD-10-CM

## 2024-06-19 RX ORDER — CITALOPRAM 40 MG/1
20 TABLET, FILM COATED ORAL DAILY
Qty: 45 TABLET | Refills: 0 | OUTPATIENT
Start: 2024-06-19 | End: 2024-08-18

## 2024-06-25 DIAGNOSIS — F41.9 ANXIETY: ICD-10-CM

## 2024-06-25 DIAGNOSIS — I10 BENIGN ESSENTIAL HYPERTENSION: ICD-10-CM

## 2024-06-26 RX ORDER — ALPRAZOLAM 0.5 MG/1
0.5 TABLET, EXTENDED RELEASE ORAL DAILY
Qty: 90 TABLET | Refills: 0 | Status: SHIPPED | OUTPATIENT
Start: 2024-06-26 | End: 2024-09-24

## 2024-06-26 RX ORDER — FUROSEMIDE 20 MG/1
20 TABLET ORAL DAILY PRN
Qty: 90 TABLET | Refills: 3 | Status: SHIPPED | OUTPATIENT
Start: 2024-06-26 | End: 2025-06-26

## 2024-07-06 DIAGNOSIS — E66.01 MORBID (SEVERE) OBESITY DUE TO EXCESS CALORIES (MULTI): ICD-10-CM

## 2024-07-08 RX ORDER — SEMAGLUTIDE 0.68 MG/ML
0.5 INJECTION, SOLUTION SUBCUTANEOUS
Qty: 3 ML | Refills: 11 | OUTPATIENT
Start: 2024-07-14

## 2024-07-11 ENCOUNTER — CLINICAL SUPPORT (OUTPATIENT)
Dept: PREADMISSION TESTING | Facility: HOSPITAL | Age: 74
End: 2024-07-11
Payer: MEDICARE

## 2024-07-11 DIAGNOSIS — M19.012 PRIMARY OSTEOARTHRITIS OF LEFT SHOULDER: ICD-10-CM

## 2024-07-11 RX ORDER — DICLOFENAC SODIUM 10 MG/G
4 GEL TOPICAL 2 TIMES DAILY PRN
Status: ON HOLD | COMMUNITY

## 2024-07-15 ENCOUNTER — APPOINTMENT (OUTPATIENT)
Dept: PHARMACY | Facility: HOSPITAL | Age: 74
End: 2024-07-15
Payer: MEDICARE

## 2024-07-15 DIAGNOSIS — E66.01 MORBID (SEVERE) OBESITY DUE TO EXCESS CALORIES (MULTI): ICD-10-CM

## 2024-07-15 NOTE — PROGRESS NOTES
Pharmacist Clinic: Weight Management    Mary See was referred to the Clinical Pharmacy Team for weight management.     Referring Provider: Skip Sal MD  Problem List Items Addressed This Visit       Morbid (severe) obesity due to excess calories (Multi)    Relevant Medications    semaglutide 0.25 mg or 0.5 mg (2 mg/3 mL) pen injector    Other Relevant Orders    Follow Up In [facility] - Pharmacy       HISTORY OF PRESENT ILLNESS    Diet: coffee and water, meat, vegetables, fruit; avoids bread and pasta, intolerant of gluten    Exercise Routine: experiences pain in feet, issues walking.  active around the house.     Weight  220 lb (initial)  Has lost 24 pounds in first 4 weeks  <5 lb weight loss since last pharmacy follow up    Reporting much more severe side effects, burping/bloating, gas, constipation alternating with diarrhea   Patient has upcoming surgical procedure Friday 7/26/24, has been advised to hold Ozempic 10-14 days prior (last dose 7/10) and will continue to hold until after procedure    PHARMACY ASSESSMENT  Pertinent PMH Review:  - PMH of Pancreatitis: No  - PMH of Retinopathy: No  - PMH of MTC: No    Allergies: Ibuprofen, Gluten, Adhesive tape-silicones, Lactose, Miconazole, and Soap     [id number] - [facility] [address] [facility] AT [facility]  [id number] [place], [place] [id number]  Phone: [phone] Fax: [phone]    [facility] - [place] - [address]  [address]  [facility] [address]  [facility] [address]  Phone: [phone] Fax: [phone]    Affordability/Accessibility: Ozempic covered by insurance plan, off label use    DRUG INTERACTIONS  - No significant drug-drug interactions exist that require adjustment to therapy    LAB  Lab Results   Component Value Date    BILITOT 0.5 05/28/2024    CALCIUM 9.5 05/28/2024    CO2 27 05/28/2024     05/28/2024    CREATININE 0.90 05/28/2024    GLUCOSE 89 05/28/2024    ALKPHOS  96 05/28/2024    K 4.0 05/28/2024    PROT 6.6 05/28/2024     05/28/2024    AST 20 05/28/2024    ALT 15 05/28/2024    BUN 17 05/28/2024    ANIONGAP 14 05/28/2024    PHOS 2.3 (L) 05/11/2021     05/08/2021    ALBUMIN 4.2 05/28/2024    EGFR 67 05/28/2024     Lab Results   Component Value Date    TRIG 205 (H) 05/28/2024    CHOL 278 (H) 05/28/2024    LDLCALC 169 (H) 05/28/2024    HDL 67.6 05/28/2024     Lab Results   Component Value Date    HGBA1C 5.2 11/23/2021       Current Outpatient Medications on File Prior to Visit   Medication Sig Dispense Refill    acetaminophen (Tylenol Extra Strength) 500 mg tablet Take 2 tablets (1,000 mg) by mouth every 4 hours if needed.      albuterol 2.5 mg /3 mL (0.083 %) nebulizer solution Take 3 mL (2.5 mg) by nebulization every 8 hours if needed.      albuterol 90 mcg/actuation inhaler USE 2 INHALATIONS BY MOUTH  EVERY 6 HOURS AS NEEDED FOR SHORTNESS OF BREATH 34 g 3    ALPRAZolam XR (Xanax XR) 0.5 mg 24 hr tablet Take 1 tablet (0.5 mg) by mouth once daily. 90 tablet 0    ascorbic acid (VITAMIN C ORAL) Take 1 tablet by mouth once daily.      calcium carbonate-vitamin D3 600 mg-20 mcg (800 unit) tablet Take 1 tablet by mouth 2 times a day.      carboxymethylcellulose-glycern 0.5-0.9 % drops Use 1-2 Drops in both eyes as needed.      cetirizine (ZyrTEC) 10 mg tablet Take 1 tablet (10 mg) by mouth once daily.      cholecalciferol (Vitamin D-3) 125 MCG (5000 UT) capsule Take 1 capsule (125 mcg) by mouth once daily.      citalopram (CeleXA) 40 mg tablet Take 0.5 tablets (20 mg) by mouth once daily. (Patient taking differently: Take 1 tablet (40 mg) by mouth once daily.) 15 tablet 1    clobetasol (Temovate) 0.05 % cream APPLY TO AFFECTED AREA(S) TOPICALLY ONCE DAILY FOR 1 WEEK, THEN TWICE WEEKLY FOR MAINTENANCE.      diclofenac sodium (Voltaren Arthritis Pain) 1 % gel Apply 4.5 inches (4 g) topically 2 times a day as needed.      docusate sodium (Colace) 100 mg capsule Take 1  capsule (100 mg) by mouth 2 times a day as needed for constipation.      estradiol (Estrace) 0.1 MG/GM vaginal cream Insert 1 Applicatorful into the vagina 3 times a week.      fluticasone (Flonase) 50 mcg/actuation nasal spray Administer 2 sprays into each nostril once daily. 48 g 3    furosemide (Lasix) 20 mg tablet Take 1 tablet (20 mg) by mouth once daily as needed (edema). 90 tablet 3    gabapentin (Neurontin) 300 mg capsule Take 1 capsule (300 mg) by mouth 2 times a day. 810 capsule 3    glucosamine-chondroit-vit C-Mn (Glucosamine Chondroitin MaxStr) 500-400 mg capsule Take 1,500 mg by mouth once daily.      guaiFENesin (Mucinex) 1,200 mg tablet extended release 12hr Take 1 tablet (1,200 mg) by mouth every 12 hours. FOR CONGESTION      ketoconazole (NIZOral) 2 % cream Apply topically twice a day.      krill/om3/dha/epa/om6/lip/astx (KRILL OIL, OMEGA 3 AND 6, ORAL) Take 1 capsule by mouth once daily.      levothyroxine (Synthroid, Levoxyl) 150 mcg tablet Take 1 tablet (150 mcg) by mouth once daily. 90 tablet 3    losartan (Cozaar) 50 mg tablet TAKE 1 TABLET BY MOUTH ONCE  DAILY 90 tablet 3    magnesium oxide-Mg AA chelate (Magnesium, oxide/AA chelate,) 300 mg capsule Take 1 capsule (300 mg) by mouth once daily.      melatonin 3 mg tablet Take 10 mg by mouth once daily at bedtime.      montelukast (Singulair) 10 mg tablet TAKE 1 TABLET BY MOUTH DAILY AS  DIRECTED 90 tablet 3    multivitamin with minerals (multivit-min-iron fum-folic ac) tablet Take 1 tablet by mouth once daily.      nystatin (Mycostatin) 100,000 unit/gram powder APPLY 2-3 TIMES DAILY TO AFFECTED AREA(S).  As needed.      omeprazole (PriLOSEC) 40 mg DR capsule Take 1 capsule (40 mg) by mouth 2 times a day. 180 capsule 3    semaglutide 0.25 mg or 0.5 mg (2 mg/3 mL) pen injector Inject 0.25 mg under the skin 1 (one) time per week for 28 days, THEN 0.5 mg 1 (one) time per week for 14 days. 2 mL 0    simethicone (Mylicon) 80 mg chewable tablet Chew 1  tablet (80 mg) every 6 hours if needed.      triamterene-hydrochlorothiazid (Maxzide-25) 37.5-25 mg tablet TAKE 1 TABLET BY MOUTH DAILY 90 tablet 3    Wixela Inhub 250-50 mcg/dose diskus inhaler Inhale 1 puff every 12 hours. 180 each 3    zinc sulfate (Zincate) 220 (50 Zn) MG capsule Take 1 capsule (50 mg of elemental zinc) by mouth once daily.      [DISCONTINUED] semaglutide 0.25 mg or 0.5 mg (2 mg/3 mL) pen injector Inject 0.5 mg under the skin 1 (one) time per week. (Patient taking differently: Inject 0.5 mg under the skin 1 (one) time per week. TAKES ON WEDNESDAYS, LAST DOSE 7/10/24) 3 mL 0     No current facility-administered medications on file prior to visit.       PATIENT EDUCATION/GOALS  - Counseled patient on MOA, expectations, side effects, duration of therapy, contraindications, administration, and monitoring parameters  - Answered all patient questions and concerns; provided [facility] phone number if issues/questions arise    RECOMMENDATIONS/PLAN  Weight Loss Plan: HOLD Ozempic for upcoming surgery 7/26/24, under direction of surgical team.  Will follow up one week after procedure and can assess for resuming Ozempic, will resume at 0.25 mg weekly dose and will likely maintain on that dose due to significant side effects experienced on 0.5 mg dose.  Requesting refills, sent prescription to [facility] per patient request    Ozempic Education:     - Counseled patient on Ozempic MOA, expectations, side effects, duration of therapy, administration, and monitoring parameters.  - Provided detailed dosing and administration counseling to ensure proper technique.   - Reviewed Ozempic titration schedule, starting with 0.25 mg once weekly for 4 weeks, then continuing on 0.5 mg once weekly. Pt verbalized understanding.  - Advised patient that they may experience improved satiety after meals and portion sizes of meals may be reduced as doses of Ozempic increase.  - Counseled patient to avoid foods that are fatty/oily as  this may precipitate the nausea/GI upset that may occur with new start Ozempic.     Clinical Pharmacist follow up: 8/2/24 2 pm    Continue all meds under the continuation of care with the referring provider and clinical pharmacy team.    Adria RudolphD  Clinical Pharmacy Specialist, Primary Care   [phone]    Verbal consent to manage patient's drug therapy was obtained from patient. They were informed they may decline to participate or withdraw from participation in pharmacy services at any time.

## 2024-07-18 ENCOUNTER — PRE-ADMISSION TESTING (OUTPATIENT)
Dept: PREADMISSION TESTING | Facility: HOSPITAL | Age: 74
End: 2024-07-18
Payer: MEDICARE

## 2024-07-18 ENCOUNTER — LAB (OUTPATIENT)
Dept: LAB | Facility: LAB | Age: 74
End: 2024-07-18
Payer: MEDICARE

## 2024-07-18 VITALS
DIASTOLIC BLOOD PRESSURE: 50 MMHG | WEIGHT: 223.55 LBS | BODY MASS INDEX: 39.61 KG/M2 | RESPIRATION RATE: 18 BRPM | HEART RATE: 66 BPM | HEIGHT: 63 IN | TEMPERATURE: 97.7 F | OXYGEN SATURATION: 99 % | SYSTOLIC BLOOD PRESSURE: 123 MMHG

## 2024-07-18 DIAGNOSIS — R73.9 ELEVATED BLOOD SUGAR: ICD-10-CM

## 2024-07-18 DIAGNOSIS — Z01.818 PREOP TESTING: Primary | ICD-10-CM

## 2024-07-18 DIAGNOSIS — Z01.818 PREOP TESTING: ICD-10-CM

## 2024-07-18 LAB
ABO GROUP (TYPE) IN BLOOD: NORMAL
ANION GAP SERPL CALC-SCNC: 13 MMOL/L (ref 10–20)
ANTIBODY SCREEN: NORMAL
ATRIAL RATE: 62 BPM
BASOPHILS # BLD AUTO: 0.03 X10*3/UL (ref 0–0.1)
BASOPHILS NFR BLD AUTO: 0.5 %
BUN SERPL-MCNC: 19 MG/DL (ref 6–23)
CALCIUM SERPL-MCNC: 9 MG/DL (ref 8.6–10.3)
CHLORIDE SERPL-SCNC: 98 MMOL/L (ref 98–107)
CO2 SERPL-SCNC: 29 MMOL/L (ref 21–32)
CREAT SERPL-MCNC: 0.9 MG/DL (ref 0.5–1.05)
EGFRCR SERPLBLD CKD-EPI 2021: 67 ML/MIN/1.73M*2
EOSINOPHIL # BLD AUTO: 0.11 X10*3/UL (ref 0–0.4)
EOSINOPHIL NFR BLD AUTO: 1.8 %
ERYTHROCYTE [DISTWIDTH] IN BLOOD BY AUTOMATED COUNT: 13.4 % (ref 11.5–14.5)
EST. AVERAGE GLUCOSE BLD GHB EST-MCNC: 94 MG/DL
GLUCOSE SERPL-MCNC: 79 MG/DL (ref 74–99)
HBA1C MFR BLD: 4.9 %
HCT VFR BLD AUTO: 36.5 % (ref 36–46)
HGB BLD-MCNC: 11.9 G/DL (ref 12–16)
IMM GRANULOCYTES # BLD AUTO: 0.03 X10*3/UL (ref 0–0.5)
IMM GRANULOCYTES NFR BLD AUTO: 0.5 % (ref 0–0.9)
LYMPHOCYTES # BLD AUTO: 1.55 X10*3/UL (ref 0.8–3)
LYMPHOCYTES NFR BLD AUTO: 24.8 %
MCH RBC QN AUTO: 29.2 PG (ref 26–34)
MCHC RBC AUTO-ENTMCNC: 32.6 G/DL (ref 32–36)
MCV RBC AUTO: 90 FL (ref 80–100)
MONOCYTES # BLD AUTO: 0.33 X10*3/UL (ref 0.05–0.8)
MONOCYTES NFR BLD AUTO: 5.3 %
NEUTROPHILS # BLD AUTO: 4.2 X10*3/UL (ref 1.6–5.5)
NEUTROPHILS NFR BLD AUTO: 67.1 %
NRBC BLD-RTO: 0 /100 WBCS (ref 0–0)
P AXIS: 29 DEGREES
P OFFSET: 176 MS
P ONSET: 111 MS
PLATELET # BLD AUTO: 237 X10*3/UL (ref 150–450)
POTASSIUM SERPL-SCNC: 3.3 MMOL/L (ref 3.5–5.3)
PR INTERVAL: 204 MS
Q ONSET: 213 MS
QRS COUNT: 11 BEATS
QRS DURATION: 104 MS
QT INTERVAL: 444 MS
QTC CALCULATION(BAZETT): 450 MS
QTC FREDERICIA: 449 MS
R AXIS: -35 DEGREES
RBC # BLD AUTO: 4.07 X10*6/UL (ref 4–5.2)
RH FACTOR (ANTIGEN D): NORMAL
SODIUM SERPL-SCNC: 137 MMOL/L (ref 136–145)
T AXIS: 22 DEGREES
T OFFSET: 435 MS
VENTRICULAR RATE: 62 BPM
WBC # BLD AUTO: 6.3 X10*3/UL (ref 4.4–11.3)

## 2024-07-18 PROCEDURE — 87081 CULTURE SCREEN ONLY: CPT | Mod: AHULAB | Performed by: ORTHOPAEDIC SURGERY

## 2024-07-18 PROCEDURE — 99204 OFFICE O/P NEW MOD 45 MIN: CPT | Performed by: PHYSICIAN ASSISTANT

## 2024-07-18 PROCEDURE — 86850 RBC ANTIBODY SCREEN: CPT

## 2024-07-18 PROCEDURE — 93005 ELECTROCARDIOGRAM TRACING: CPT | Performed by: PHYSICIAN ASSISTANT

## 2024-07-18 PROCEDURE — 86900 BLOOD TYPING SEROLOGIC ABO: CPT

## 2024-07-18 PROCEDURE — 80048 BASIC METABOLIC PNL TOTAL CA: CPT

## 2024-07-18 PROCEDURE — 36415 COLL VENOUS BLD VENIPUNCTURE: CPT

## 2024-07-18 PROCEDURE — 86901 BLOOD TYPING SEROLOGIC RH(D): CPT

## 2024-07-18 PROCEDURE — 85025 COMPLETE CBC W/AUTO DIFF WBC: CPT

## 2024-07-18 PROCEDURE — 83036 HEMOGLOBIN GLYCOSYLATED A1C: CPT

## 2024-07-18 RX ORDER — CHLORHEXIDINE GLUCONATE ORAL RINSE 1.2 MG/ML
SOLUTION DENTAL
Qty: 475 ML | Refills: 0 | Status: ON HOLD | OUTPATIENT
Start: 2024-07-18

## 2024-07-18 ASSESSMENT — ENCOUNTER SYMPTOMS
SKIN CHANGES: 0
ABDOMINAL PAIN: 0
DYSPNEA WITH EXERTION: 0
CHILLS: 0
SHORTNESS OF BREATH: 0
VOMITING: 0
DYSPNEA AT REST: 0
NUMBNESS: 0
EYE DISCHARGE: 0
SINUS CONGESTION: 0
DIFFICULTY URINATING: 0
PALPITATIONS: 0
COUGH: 0
MYALGIAS: 0
LIMITED RANGE OF MOTION: 1
EYE PAIN: 0
ABDOMINAL DISTENTION: 0
DIARRHEA: 0
TROUBLE SWALLOWING: 0
VISUAL CHANGE: 0
WEAKNESS: 0
DOUBLE VISION: 0
HEMOPTYSIS: 0
ARTHRALGIAS: 1
FEVER: 0
BLOOD IN STOOL: 0
NAUSEA: 0
WHEEZING: 0
RHINORRHEA: 0
NECK PAIN: 0
UNEXPECTED WEIGHT CHANGE: 0
NECK STIFFNESS: 0
DYSURIA: 0
CONFUSION: 0
CONSTIPATION: 1
WOUND: 0
BRUISES/BLEEDS EASILY: 0
EXCESSIVE BLEEDING: 0
LIGHT-HEADEDNESS: 0

## 2024-07-18 NOTE — H&P (VIEW-ONLY)
[facility]/PAT Evaluation       Name: Mary See (Mary See)  /Age: 1950/74 y.o.       Date of Consult: 24     Referring Provider: Dr. Nix    Surgery, Date, and Length: Left Shoulder Reverse Total Arthroplasty - Left , 24, 150MIN    Mary See is a 74 year-old female who presents to the [facility] for perioperative risk assessment prior to surgery.    Patient presents with a primary diagnosis of left shoulder osteoarthritis. Pt refers to pain in left shoulder for many years.  Pt refers to limited ROM in left shoulder. Pain is waking her up at night. She has tried steroid joint injections which provide only 1 hour pain relief.  She wishes to proceed with TJR of shoulder as planned.     This note was created in part upon personal review of patient's medical records.      Patient is scheduled to have Left Shoulder Reverse Total Arthroplasty - Left       Pt denies any past history of anesthetic complications such as PONV, awareness, prolonged sedation, dental damage, aspiration, cardiac arrest, difficult intubation, difficult I.V. access or unexpected hospital admissions.  NO malignant hyperthermia and or pseudocholinesterase deficiency.  No history of blood transfusions     The patient is not a [employer] and will accept blood and blood products if medically indicated.   Type and screen sent.      Past Medical History:   Diagnosis Date    Abnormal amount of sputum     Mucinex BID    Anemia     Anxiety     Asthma, moderate persistent (HHS-HCC)     controlled on Wixela    Carpal tunnel syndrome, bilateral upper limbs 2020    Severe carpal tunnel syndrome of both wrists    Depression     Fibromyalgia     Gastric ulcer     GERD (gastroesophageal reflux disease)     Hyperlipidemia     Hypertension     Hypothyroidism     IBS (irritable bowel syndrome)     Inadequate sleep hygiene 10/01/2018    Inadequate sleep hygiene    Morbid obesity (Multi)     OA (osteoarthritis)     PTSD  (post-traumatic stress disorder)     PVC (premature ventricular contraction)     Remote PVCs, PACs    Scoliosis     Sleep apnea     CPAP setting 10-20, keeps HOB elevated 33 degrees    Spinal stenosis     Submandibular abscess 2021    Vocal cord dysfunction     w/ chronic upper airway cough and reccurent laryngospasm       Past Surgical History:   Procedure Laterality Date    ABSCESS DRAINAGE      submandibular    ANKLE SURGERY Right     CARPAL TUNNEL RELEASE      CATARACT EXTRACTION Bilateral 10/01/2018     SECTION, CLASSIC  1987     Section    COLONOSCOPY      HERNIA REPAIR      Inguinal Hernia Repair    HIP DEBRIDEMENT      s/p replacement    LASIK      ROTATOR CUFF REPAIR Left     TONSILLECTOMY      Tonsillectomy    TOTAL HIP ARTHROPLASTY Right     TOTAL KNEE ARTHROPLASTY      , 2016    TOTAL SHOULDER ARTHROPLASTY Right 2022    TRACHEOSTOMY CLOSURE      TRACHEOSTOMY TUBE PLACEMENT  2021       Patient  has no history on file for sexual activity.    Family History   Problem Relation Name Age of Onset    Cancer Mother          ADRENAL GLAND; DIGESTIVE ORGAN    Other (ANXIETY AND DEPRESSION) Mother      Alcohol abuse Father      Liver cancer Father      Ovarian cancer Sister      Dilated cardiomyopathy Sister          NONISCHEMIC     Social History     Socioeconomic History    Marital status:      Spouse name: Not on file    Number of children: Not on file    Years of education: Not on file    Highest education level: Not on file   Occupational History    Not on file   Tobacco Use    Smoking status: Former     Current packs/day: 0.00     Average packs/day: 1 pack/day for 7.0 years (7.0 ttl pk-yrs)     Types: Cigarettes     Start date:      Quit date:      Years since quittin.5     Passive exposure: Never    Smokeless tobacco: Never   Vaping Use    Vaping status: Never Used   Substance and Sexual Activity    Alcohol use: Never    Drug use:  Never    Sexual activity: Not on file   Other Topics Concern    Not on file   Social History Narrative    Not on file     Social Determinants of Health     Financial Resource Strain: Not on file   Food Insecurity: Not on file   Transportation Needs: Not on file   Physical Activity: Not on file   Stress: Not on file   Social Connections: Unknown (5/19/2024)    Received from [facility], [facility] Social Needs Screening - Social Connection     Would you like help with any of the following needs: food, medicine/medical supplies, transportation, loneliness, housing or utilities?: Not on file   Intimate Partner Violence: Not on file   Housing Stability: Not on file        Allergies   Allergen Reactions    Ibuprofen Shortness of breath and Blurred vision     GI UPSET  tachycardia    Gluten Unknown     bloating,pain ,gas and diarrrhea    Adhesive Tape-Silicones Itching     incision glue, redness, blisters    Lactose GI Upset    Miconazole Hives    Soap Rash     Dial soap       Prior to Admission medications    Medication Sig Start Date End Date Taking? Authorizing Provider   acetaminophen (Tylenol Extra Strength) 500 mg tablet Take 2 tablets (1,000 mg) by mouth every 4 hours if needed.   Yes Historical Provider, MD   albuterol 2.5 mg /3 mL (0.083 %) nebulizer solution Take 3 mL (2.5 mg) by nebulization every 8 hours if needed. 5/5/14  Yes Historical Provider, MD   albuterol 90 mcg/actuation inhaler USE 2 INHALATIONS BY MOUTH  EVERY 6 HOURS AS NEEDED FOR SHORTNESS OF BREATH 11/7/23  Yes Skip aSl MD   ALPRAZolam XR (Xanax XR) 0.5 mg 24 hr tablet Take 1 tablet (0.5 mg) by mouth once daily. 6/26/24 9/24/24 Yes Skip Sal MD   ascorbic acid (VITAMIN C ORAL) Take 1 tablet by mouth once daily.   Yes Historical Provider, MD   calcium carbonate-vitamin D3 600 mg-20 mcg (800 unit) tablet Take 1 tablet by mouth 2 times a day.   Yes Historical Provider, MD   carboxymethylcellulose-glycern 0.5-0.9 %  drops Use 1-2 Drops in both eyes as needed.   Yes Historical Provider, MD   cetirizine (ZyrTEC) 10 mg tablet Take 1 tablet (10 mg) by mouth once daily.   Yes Historical Provider, MD   cholecalciferol (Vitamin D-3) 125 MCG (5000 UT) capsule Take 1 capsule (125 mcg) by mouth once daily.   Yes Historical Provider, MD   citalopram (CeleXA) 40 mg tablet Take 0.5 tablets (20 mg) by mouth once daily.  Patient taking differently: Take 1 tablet (40 mg) by mouth once daily. 5/6/24 7/18/24 Yes Juanis Knight MD   diclofenac sodium (Voltaren Arthritis Pain) 1 % gel Apply 4.5 inches (4 g) topically 2 times a day as needed.   Yes Historical Provider, MD   docusate sodium (Colace) 100 mg capsule Take 1 capsule (100 mg) by mouth 2 times a day as needed for constipation.   Yes Historical Provider, MD   estradiol (Estrace) 0.1 MG/GM vaginal cream Insert 1 Applicatorful into the vagina 3 times a week. 8/17/11  Yes Historical Provider, MD   fluticasone (Flonase) 50 mcg/actuation nasal spray Administer 2 sprays into each nostril once daily. 5/30/23 7/18/24 Yes Skip Sal MD   furosemide (Lasix) 20 mg tablet Take 1 tablet (20 mg) by mouth once daily as needed (edema). 6/26/24 6/26/25 Yes Skip Sal MD   gabapentin (Neurontin) 300 mg capsule Take 1 capsule (300 mg) by mouth 2 times a day. 5/28/24  Yes Skip Sal MD   glucosamine-chondroit-vit C-Mn (Glucosamine Chondroitin MaxStr) 500-400 mg capsule Take 1,500 mg by mouth once daily.   Yes Historical Provider, MD   guaiFENesin (Mucinex) 1,200 mg tablet extended release 12hr Take 1 tablet (1,200 mg) by mouth every 12 hours. FOR CONGESTION   Yes Historical Provider, MD   krill/om3/dha/epa/om6/lip/astx (KRILL OIL, OMEGA 3 AND 6, ORAL) Take 1 capsule by mouth once daily.   Yes Historical Provider, MD   levothyroxine (Synthroid, Levoxyl) 150 mcg tablet Take 1 tablet (150 mcg) by mouth once daily. 5/30/23 7/18/24 Yes Skip Sal MD   losartan (Cozaar) 50 mg tablet TAKE 1 TABLET  BY MOUTH ONCE  DAILY 4/25/24  Yes Skip Sal MD   magnesium oxide-Mg AA chelate (Magnesium, oxide/AA chelate,) 300 mg capsule Take 1 capsule (300 mg) by mouth once daily.   Yes Historical Provider, MD   melatonin 3 mg tablet Take 10 mg by mouth once daily at bedtime. 5/5/22  Yes Historical Provider, MD   montelukast (Singulair) 10 mg tablet TAKE 1 TABLET BY MOUTH DAILY AS  DIRECTED 2/5/24  Yes Skip Sal MD   multivitamin with minerals (multivit-min-iron fum-folic ac) tablet Take 1 tablet by mouth once daily. 9/30/14  Yes Historical Provider, MD   nystatin (Mycostatin) 100,000 unit/gram powder APPLY 2-3 TIMES DAILY TO AFFECTED AREA(S).  As needed. 12/9/19  Yes Historical Provider, MD   omeprazole (PriLOSEC) 40 mg DR capsule Take 1 capsule (40 mg) by mouth 2 times a day. 5/30/23 7/18/24 Yes Skip Sal MD   semaglutide 0.25 mg or 0.5 mg (2 mg/3 mL) pen injector Inject 0.5 mg under the skin 1 (one) time per week. 7/15/24  Yes Skip Sal MD   triamterene-hydrochlorothiazid (Maxzide-25) 37.5-25 mg tablet TAKE 1 TABLET BY MOUTH DAILY 2/5/24  Yes Skip Sal MD   Wixela Inhub 250-50 mcg/dose diskus inhaler Inhale 1 puff every 12 hours. 5/30/23 7/18/24 Yes Skip Sal MD   zinc sulfate (Zincate) 220 (50 Zn) MG capsule Take 1 capsule (50 mg of elemental zinc) by mouth once daily.   Yes Historical Provider, MD   chlorhexidine (Peridex) 0.12 % solution Swish for 30 seconds and spit 15mL of solution the night before and morning of surgery 7/18/24   Kenzie Alanis PA-C   clobetasol (Temovate) 0.05 % cream APPLY TO AFFECTED AREA(S) TOPICALLY ONCE DAILY FOR 1 WEEK, THEN TWICE WEEKLY FOR MAINTENANCE.    Historical Provider, MD   ketoconazole (NIZOral) 2 % cream Apply topically twice a day. 9/4/20   Historical Provider, MD   semaglutide 0.25 mg or 0.5 mg (2 mg/3 mL) pen injector Inject 0.25 mg under the skin 1 (one) time per week for 28 days, THEN 0.5 mg 1 (one) time per week for 14 days. 6/2/24  7/14/24  Skip Sal MD   simethicone (Mylicon) 80 mg chewable tablet Chew 1 tablet (80 mg) every 6 hours if needed.    Historical Provider, MD   semaglutide 0.25 mg or 0.5 mg (2 mg/3 mL) pen injector Inject 0.5 mg under the skin 1 (one) time per week.  Patient taking differently: Inject 0.5 mg under the skin 1 (one) time per week. TAKES ON WEDNESDAYS, LAST DOSE 7/10/24 6/23/24 7/15/24  Skip Sal MD        PAT ROS:   Constitutional:    no fever   no chills   no unexpected weight change  Neuro/Psych:    no numbness   no weakness   no light-headedness   no confusion  Eyes:    no discharge   no pain   no vision loss   no diplopia   no visual disturbance   use of corrective lenses  Ears:    no ear pain   no hearing loss   no tinnitus  Nose:    no nasal discharge   no sinus congestion   no epistaxis  Mouth:    no dental issues   no mouth pain   no oral bleeding   no mouth lesions  Throat:    no throat pain   no dysphagia  Neck:    no neck pain   no neck stiffness  Cardio:    Functional 4 Mets. Patient denies SOB walking up 1 flights of stairs   PT exercises daily ; cooking, cleaning    no chest pain   no palpitations   no peripheral edema   no dyspnea   no OLSON  Respiratory:    no cough   no wheezing   no hemoptysis   no shortness of breath  Endocrine:    no cold intolerance   no heat intolerance  GI:    no abdominal distention   no abdominal pain   constipation   no diarrhea   no nausea   no vomiting   no blood in stool  :    no difficulty urinating   no dysuria   no oliguria  Musculoskeletal:    arthralgias (left shoulder)   no myalgias   decreased ROM (left shoulder)  Hematologic:    does not bruise/bleed easily   no excessive bleeding   no history of blood transfusion   no blood clots  Skin:   no skin changes   no sores/wound   no rash      Physical Exam  Constitutional:       General: She is not in acute distress.     Appearance: Normal appearance. She is obese. She is not ill-appearing, toxic-appearing  "or diaphoretic.   HENT:      Head: Normocephalic and atraumatic.      Nose: Nose normal. No rhinorrhea.      Mouth/Throat:      Pharynx: No oropharyngeal exudate.   Eyes:      Extraocular Movements: Extraocular movements intact.      Conjunctiva/sclera: Conjunctivae normal.   Cardiovascular:      Rate and Rhythm: Normal rate and regular rhythm.      Heart sounds: No murmur heard.     No friction rub. No gallop.      Comments: Functional 4 Mets. Patient denies SOB walking up 2 flights of stairs     Pulmonary:      Effort: Pulmonary effort is normal. No respiratory distress.      Breath sounds: Normal breath sounds. No stridor. No wheezing or rhonchi.   Abdominal:      General: Bowel sounds are normal. There is no distension.      Palpations: Abdomen is soft. There is no mass.      Tenderness: There is no abdominal tenderness. There is no guarding or rebound.      Hernia: No hernia is present.   Musculoskeletal:         General: Tenderness (left shoulder with decreased ROM with abduction of left arm) present. No swelling, deformity or signs of injury.      Cervical back: Normal range of motion and neck supple. No rigidity or tenderness.   Skin:     General: Skin is warm and dry.      Coloration: Skin is not jaundiced or pale.      Findings: No bruising, erythema, lesion or rash.   Neurological:      General: No focal deficit present.      Mental Status: She is alert and oriented to person, place, and time.      Cranial Nerves: No cranial nerve deficit.      Sensory: No sensory deficit.      Motor: Weakness (LUE) present.      Coordination: Coordination normal.   Psychiatric:         Mood and Affect: Mood normal.         Behavior: Behavior normal.          PAT AIRWAY:   Airway:     Mallampati::  II    Neck ROM::  Full   No broken teeth, no dentures and no missing teeth          Visit Vitals  /50   Pulse 66   Temp 36.5 °C (97.7 °F)   Resp 18   Ht 1.6 m (5' 3\")   Wt 101 kg (223 lb 8.7 oz)   SpO2 99%   BMI 39.60 " kg/m²   OB Status Postmenopausal   Smoking Status Former   BSA 2.12 m²      LABS:  Lab Results   Component Value Date    GLUCOSE 79 07/18/2024    CALCIUM 9.0 07/18/2024     07/18/2024    K 3.3 (L) 07/18/2024    CO2 29 07/18/2024    CL 98 07/18/2024    BUN 19 07/18/2024    CREATININE 0.90 07/18/2024      Lab Results   Component Value Date    WBC 6.3 07/18/2024    HGB 11.9 (L) 07/18/2024    HCT 36.5 07/18/2024    MCV 90 07/18/2024     07/18/2024      Lab Results   Component Value Date    HGBA1C 4.9 07/18/2024      EKG 7/18/24  NSR  Left axis deviation  Possible lateral infarct, age undetermined  Abnormal EKG   Vent rate = 62 bpm    Assessment and Plan:   Patient is a 74-year-old female scheduled for a Left Shoulder Reverse Total Arthroplasty - Left  with Dr. Nix on 7/26/24.    Patient has no active cardiac symptoms.   Patient denies any chest pain, tightness, heaviness, pressure, radiating pain, palpitations, irregular heartbeats, lightheadedness, cough, congestion, shortness of breath, OLSON, PND, near syncope, weight loss or gain.     RCRI  0  , 3.9 % Risk of MACE    Cardiac:  HTN  Losartan HOLD evening prior to surgery and morning of surgery       Encouraged lifestyle modifications, low-sodium diet, and increase activity as tolerated.  Monitor BP and follow up with managing physician for readings sustaining >140/90.    Lower extremity edema - hold furosemide dos    Pulmonary:  MALIHA- Known and treated  MALIHA- Patient was instructed to bring CPAP machine the morning of surgery. Recommend prioritizing  nonopioid analgesic techniques (regional and local anesthesia, nonsteroidal medications, etc) before the administration of opioids and close monitoring for hypoventilation after surgery due to MALIHA. Increased vigilance is recommended with the use of narcotics due to an increased risk for opioid induced respiratory depression       Psych:  Obesity - hold Ozempic 7 days prior to  surgery    Hematology:  Anemia  7/18/24 H/H  11.9/36.5%    Hypokalemia  7/18/24 K 3.3    Patient instructed to ambulate as soon as possible postoperatively to decrease thromboembolic risk.   Initiate mechanical DVT prophylaxis as soon as possible and initiate chemical prophylaxis when deemed safe from a bleeding standpoint post surgery.     LABS: CBC, BMP, T&S, MRSA, A1c, EKG ordered. Lab results reviewed and unremarkable.    Followup: MRSA and A1c pending    Addendum 7/19/24:  A1c results reviewed and wnl.    STOP BANG: +MALIHA    Caprini: 8    Risk assessment complete.  Patient is scheduled for a intermediate surgical risk procedure.        Preoperative medication instructions were provided and reviewed with the patient.  Any additional testing or evaluation was explained to the patient.  Nothing by mouth instructions were discussed and patient's questions were answered prior to conclusion to this encounter.  Patient verbalized understanding of preoperative instructions given in preadmission testing; discharge instructions available in EMR.    This note was dictated by a speech recognition.  Minor errors may have been detected in a speech recognition.

## 2024-07-18 NOTE — PREPROCEDURE INSTRUCTIONS
Medication List            Accurate as of July 18, 2024 11:20 AM. Always use your most recent med list.                * albuterol 2.5 mg /3 mL (0.083 %) nebulizer solution  Notes to patient: Ok to use morning of surgery if needed     * albuterol 90 mcg/actuation inhaler  USE 2 INHALATIONS BY MOUTH  EVERY 6 HOURS AS NEEDED FOR SHORTNESS OF BREATH  Notes to patient: Ok to use morning of surgery if needed     ALPRAZolam XR 0.5 mg 24 hr tablet  Commonly known as: Xanax XR  Take 1 tablet (0.5 mg) by mouth once daily.  Medication Adjustments for Surgery: Take morning of surgery with sip of water, no other fluids  Notes to patient: If needed     calcium carbonate-vitamin D3 600 mg-20 mcg (800 unit) tablet  Medication Adjustments for Surgery: Continue until night before surgery     carboxymethylcellulose-glycern 0.5-0.9 % drops  Medication Adjustments for Surgery: Continue until night before surgery     cetirizine 10 mg tablet  Commonly known as: ZyrTEC  Medication Adjustments for Surgery: Continue until night before surgery     chlorhexidine 0.12 % solution  Commonly known as: Peridex  Swish for 30 seconds and spit 15mL of solution the night before and morning of surgery     cholecalciferol 125 MCG (5000 UT) capsule  Commonly known as: Vitamin D-3  Medication Adjustments for Surgery: Continue until night before surgery     citalopram 40 mg tablet  Commonly known as: CeleXA  Take 0.5 tablets (20 mg) by mouth once daily.  Medication Adjustments for Surgery: Take morning of surgery with sip of water, no other fluids     clobetasol 0.05 % cream  Commonly known as: Temovate  Medication Adjustments for Surgery: Continue until night before surgery     docusate sodium 100 mg capsule  Commonly known as: Colace  Medication Adjustments for Surgery: Continue until night before surgery     estradiol 0.01 % (0.1 mg/gram) vaginal cream  Commonly known as: Estrace  Medication Adjustments for Surgery: Continue until night before  surgery     fluticasone 50 mcg/actuation nasal spray  Commonly known as: Flonase  Administer 2 sprays into each nostril once daily.  Notes to patient: Ok to use morning of surgery if needed     furosemide 20 mg tablet  Commonly known as: Lasix  Take 1 tablet (20 mg) by mouth once daily as needed (edema).  Medication Adjustments for Surgery: Continue until night before surgery     gabapentin 300 mg capsule  Commonly known as: Neurontin  Take 1 capsule (300 mg) by mouth 2 times a day.  Medication Adjustments for Surgery: Take morning of surgery with sip of water, no other fluids     Glucosamine Chondroitin MaxStr 500-400 mg capsule  Generic drug: glucosamine-chondroit-vit C-Mn  Medication Adjustments for Surgery: Stop 7 days before surgery     ketoconazole 2 % cream  Commonly known as: NIZOral  Medication Adjustments for Surgery: Continue until night before surgery     KRILL OIL (OMEGA 3 AND 6) ORAL  Medication Adjustments for Surgery: Stop 7 days before surgery     levothyroxine 150 mcg tablet  Commonly known as: Synthroid, Levoxyl  Take 1 tablet (150 mcg) by mouth once daily.  Medication Adjustments for Surgery: Take morning of surgery with sip of water, no other fluids     losartan 50 mg tablet  Commonly known as: Cozaar  TAKE 1 TABLET BY MOUTH ONCE  DAILY  Notes to patient: HOLD evening prior to surgery and morning of surgery        Magnesium (oxide/AA chelate) 300 mg capsule  Generic drug: magnesium oxide-Mg AA chelate  Medication Adjustments for Surgery: Continue until night before surgery     melatonin 3 mg tablet  Notes to patient: Ok to use night before and night of surgery     montelukast 10 mg tablet  Commonly known as: Singulair  TAKE 1 TABLET BY MOUTH DAILY AS  DIRECTED  Medication Adjustments for Surgery: Take morning of surgery with sip of water, no other fluids     Mucinex 1,200 mg tablet extended release 12hr  Generic drug: guaiFENesin  Medication Adjustments for Surgery: Take morning of surgery with  sip of water, no other fluids  Notes to patient: If needed     multivitamin with minerals tablet  Medication Adjustments for Surgery: Stop 7 days before surgery     nystatin 100,000 unit/gram powder  Commonly known as: Mycostatin  Medication Adjustments for Surgery: Continue until night before surgery     omeprazole 40 mg DR capsule  Commonly known as: PriLOSEC  Take 1 capsule (40 mg) by mouth 2 times a day.  Medication Adjustments for Surgery: Take morning of surgery with sip of water, no other fluids     * semaglutide 0.25 mg or 0.5 mg (2 mg/3 mL) pen injector  Inject 0.25 mg under the skin 1 (one) time per week for 28 days, THEN 0.5 mg 1 (one) time per week for 14 days.  Start taking on: June 2, 2024  Medication Adjustments for Surgery: Stop 7 days before surgery     * semaglutide 0.25 mg or 0.5 mg (2 mg/3 mL) pen injector  Inject 0.5 mg under the skin 1 (one) time per week.  Medication Adjustments for Surgery: Stop 7 days before surgery     simethicone 80 mg chewable tablet  Commonly known as: Mylicon  Medication Adjustments for Surgery: Continue until night before surgery     triamterene-hydrochlorothiazid 37.5-25 mg tablet  Commonly known as: Maxzide-25  TAKE 1 TABLET BY MOUTH DAILY  Medication Adjustments for Surgery: Take morning of surgery with sip of water, no other fluids     Tylenol Extra Strength 500 mg tablet  Generic drug: acetaminophen  Medication Adjustments for Surgery: Take morning of surgery with sip of water, no other fluids  Notes to patient: If needed     VITAMIN C ORAL  Medication Adjustments for Surgery: Stop 7 days before surgery     Voltaren Arthritis Pain 1 % gel  Generic drug: diclofenac sodium  Medication Adjustments for Surgery: Continue until night before surgery     Wixela Inhub 250-50 mcg/dose diskus inhaler  Generic drug: fluticasone propion-salmeteroL  Inhale 1 puff every 12 hours.  Notes to patient: Ok to use morning of surgery      zinc sulfate 220 (50 Zn) MG capsule  Commonly  known as: Zincate  Medication Adjustments for Surgery: Stop 7 days before surgery           * This list has 4 medication(s) that are the same as other medications prescribed for you. Read the directions carefully, and ask your doctor or other care provider to review them with you.                              **Concerning above medication instructions, if medication is normally taken at night, continue normal schedule.**  **DO NOT TAKE NIGHT PRIOR AND MORNING OF SURGERY**    CONTACT SURGEON'S OFFICE IF YOU DEVELOP:  * Fever = 100.4 F   * New respiratory symptoms (e.g. cough, shortness of breath, respiratory distress, sore throat)  * Recent loss of taste or smell  *Flu like symptoms such as headache, fatigue or gastrointestinal symptoms  * You develop any open sores, shingles, burning or painful urination   AND/OR:  * You no longer wish to have the surgery.  * Any other personal circumstances change that may lead to the need to cancel or defer this surgery.  *You were admitted to any hospital within one week of your planned procedure.    SMOKING:  *Quitting smoking can make a huge difference to your health and recovery from surgery.    *If you need help with quitting, call 1-164-QUIT-NOW.    THE DAY BEFORE SURGERY:  *Do not eat any food after midnight the night before surgery.   *You are permitted to drink clear liquids (i.e. water, black coffee (no milk or cream), tea, apple juice and electrolyte drinks (gatorade)) up to 13.5 ounces, up to 2 hours before your arrival time.  *You may chew gum until 2 hours before your surgery    SURGICAL TIME  *You will be contacted between 2 p.m. and 6 p.m. the business day before your surgery with your arrival time.  *If you haven't received a call by 6pm, call [phone].  *Scheduled surgery times may change and you will be notified if this occurs-check your personal voicemail for any updates.    ON THE MORNING OF SURGERY:  *Wear comfortable, loose fitting clothing.   *Do not  use moisturizers, creams, lotions or perfume.  *All jewelry and valuables should be left at home.  *Prosthetic devices such as contact lenses, hearing aids, dentures, eyelash extensions, hairpins and body piercing must be removed before surgery.    BRING WITH YOU:  *Photo ID and insurance card  *Current list of medicines and allergies  *Pacemaker/Defibrillator/Heart stent cards  *CPAP machine and mask  *Slings/splints/crutches  *Copy of your complete Advanced Directive/DHPOA-if applicable  *Neurostimulator implant remote    PARKING AND ARRIVAL:  *Check in at the Main Entrance desk and let them know you are here for surgery.  *You will be directed to the 2nd floor surgical waiting area.    AFTER OUTPATIENT SURGERY:  *A responsible adult MUST accompany you at the time of discharge and stay with you for 24 hours after your surgery.  *You may NOT drive yourself home after surgery.  *You may use a taxi or ride sharing service ([place], Uber) to return home ONLY if you are accompanied by a friend or family member.  *Instructions for resuming your medications will be provided by your surgeon.        Patient Information: Oral/Dental Rinse  **This is a prescription; pick it up at your preferred local pharmacy **  What is oral/dental rinse?   It is a mouthwash. It is a way of cleaning the mouth with a germ killing solution before your surgery.  The solution contains chlorhexidine, commonly known as CHG.   It is used inside the mouth to kill a bacteria known as Staphylococcus aureus.  Let your doctor know if you are allergic to Chlorhexidine.    Why do I need to use CHG oral/dental rinse?  The CHG oral/dental rinse helps to kill a bacteria in your mouth known a Staphylococcus aureus.     This reduces the risk of infection at the surgical site.      Using your CHG oral/dental rinse  STEPS:  Use your CHG oral/dental rinse after you brush your teeth the night before (at bedtime) and the morning of your surgery.  Follow all  directions on your prescription label.    Use the cap on the container to measure 15ml (fill cap to fill line)  Swish (gargle if you can) the mouthwash in your mouth for at least 30 seconds, (do not to swallow) spit out  After you use your CHG rinse, do not rinse your mouth with water, drink or eat.  Please refer to prescription label for the appropriate time to resume oral intake  Dental rinse comes in one size bottle: 473ml ~16oz.  You will have leftover    rinse, discard after this use.    What side effects might I have using the CHG oral/dental rinse?  CHG rinse will stick to plaque on the teeth.  Brush and floss just before use.  Teeth brushing will help avoid staining of plaque during use.    Who should I contact if I have questions about the CHG oral/dental rinse?  Please call [facility], Preadmission Testing at [phone] if you have any questions     Home Preoperative Antibacterial Shower     What is a home preoperative antibacterial shower?  This shower is a way of cleaning the skin with a germ killing soap before surgery.  The soap contains chlorhexidine, commonly known as CHG.  CHG is a soap for your skin with germ killing ability.  Let your doctor know if you are allergic to chlorhexidine.    Why do I need to take a preoperative antibacterial shower?  Skin is not sterile.  It is best to try to make your skin as free of germs as possible before surgery.  Proper cleansing with a germ killing soap before surgery can lower the number of germs on your skin.  This helps to reduce the risk of infection at the surgical site.  Following the instructions listed below will help you prepare your skin for surgery.      How do I use the CHG skin cleanser?  Steps:  Begin using your CHG soap five days before your scheduled surgery on ________________________.    Days 1-4 Shower before bed:  Wash your face and genitals with your normal soap and rinse.    Wash and rinse your hair using  the CHG soap. Rinse completely, do not condition your   Hair.          3.    Apply the CHG soap to a clean wet washcloth.  Turn the water off or move away                From the water spray to avoid premature rinsing of the CHG soap as you are applying.     4.   Lather your entire body from the neck down.  Do not use on your face or genitals.   Pay special attention to the area(s) where your incision(s) will be located unless they are on your face.  Avoid scrubbing your skin too hard.  The important point is to have the CHG soap sit on your skin for 3 minutes.    When the 3 minutes are up, turn on the water and rinse the CHG soap off your body completely.   Pat yourself dry with a clean, freshly-laundered towel.  Dress in clean, freshly laundered night clothes.    Be sure to sleep with clean, freshly laundered sheets.  Day 5:  Last shower is the morning before surgery: Follow above Instructions.    NOTE:    *Hair extensions should be removed    *Keep CHG soap out of eyes and ear canals   *DO NOT wash with regular soap on your body after you have used the CHG        soap solution  *DO NOT apply powders, lotions, or perfume.  *Deodorant may be used days 1-4, BUT NOT the day of surgery    Who should I contact if I have any questions regarding the use of CHG soap?  Call the [facility], Preadmission Testing at [phone] if you have any questions.              Patient Information: Pre-Operative Infection Prevention Measures     Why did I have my nose, under my arms and groin swabbed?  The purpose of the swab is to identify Staphylococcus aureus inside your nose or on your skin.  The swab was sent to the laboratory for culture.  A positive swab/culture for Staphylococcus aureus is called colonization or carriage.      What is Staphylococcus aureus?  Staphylococcus aureus, also known as “staph”, is a germ found on the skin or in the nose of healthy people.  Sometimes Staphylococcus aureus  can get into the body and cause an infection.  This can be minor (such as pimples, boils or other skin problems).  It might also be serious (such as blood infection, pneumonia or a surgical site infection).    What is Staphylococcus aureus colonization or carriage?  Colonization or carriage means that a person has the germ but is not sick from it.  These bacteria can be spread on the hands or when breathing or sneezing.    How is Staphylococcus aureus spread?  It is most often spread by close contact with a person or item that carries it.    What happens if my culture is positive for Staphylococcus aureus?  Your doctor/medical team will use this information to guide any antibiotic treatment which may be necessary.  Regardless of the culture results, we will clean the inside of your nose with a betadine swab just before you have your surgery.      Will I get an infection if I have Staphylococcus aureus in my nose or on my skin?  Anyone can get an infection with Staphylococcus aureus.  However, the best way to reduce your risk of infection is to follow the instructions provided to you for the use of your CHG soap and dental rinse.        Who should I contact if I have any questions?  Call the [facility], Preadmission Testing at [phone] if you have any questions.

## 2024-07-18 NOTE — PREPROCEDURE INSTRUCTIONS
Medication List            Accurate as of July 18, 2024 11:06 AM. Always use your most recent med list.                * albuterol 2.5 mg /3 mL (0.083 %) nebulizer solution  Notes to patient: Ok to use morning of surgery if needed     * albuterol 90 mcg/actuation inhaler  USE 2 INHALATIONS BY MOUTH  EVERY 6 HOURS AS NEEDED FOR SHORTNESS OF BREATH  Notes to patient: Ok to use morning of surgery if needed     ALPRAZolam XR 0.5 mg 24 hr tablet  Commonly known as: Xanax XR  Take 1 tablet (0.5 mg) by mouth once daily.  Medication Adjustments for Surgery: Take morning of surgery with sip of water, no other fluids  Notes to patient: If needed     calcium carbonate-vitamin D3 600 mg-20 mcg (800 unit) tablet  Medication Adjustments for Surgery: Continue until night before surgery     carboxymethylcellulose-glycern 0.5-0.9 % drops  Medication Adjustments for Surgery: Continue until night before surgery     cetirizine 10 mg tablet  Commonly known as: ZyrTEC  Medication Adjustments for Surgery: Continue until night before surgery     chlorhexidine 0.12 % solution  Commonly known as: Peridex  Swish for 30 seconds and spit 15mL of solution the night before and morning of surgery     cholecalciferol 125 MCG (5000 UT) capsule  Commonly known as: Vitamin D-3  Medication Adjustments for Surgery: Continue until night before surgery     citalopram 40 mg tablet  Commonly known as: CeleXA  Take 0.5 tablets (20 mg) by mouth once daily.  Medication Adjustments for Surgery: Take morning of surgery with sip of water, no other fluids     clobetasol 0.05 % cream  Commonly known as: Temovate  Medication Adjustments for Surgery: Continue until night before surgery     docusate sodium 100 mg capsule  Commonly known as: Colace  Medication Adjustments for Surgery: Continue until night before surgery     estradiol 0.01 % (0.1 mg/gram) vaginal cream  Commonly known as: Estrace  Medication Adjustments for Surgery: Continue until night before  surgery     fluticasone 50 mcg/actuation nasal spray  Commonly known as: Flonase  Administer 2 sprays into each nostril once daily.  Notes to patient: Ok to use morning of surgery if needed     furosemide 20 mg tablet  Commonly known as: Lasix  Take 1 tablet (20 mg) by mouth once daily as needed (edema).  Medication Adjustments for Surgery: Continue until night before surgery     gabapentin 300 mg capsule  Commonly known as: Neurontin  Take 1 capsule (300 mg) by mouth 2 times a day.  Medication Adjustments for Surgery: Take morning of surgery with sip of water, no other fluids     Glucosamine Chondroitin MaxStr 500-400 mg capsule  Generic drug: glucosamine-chondroit-vit C-Mn  Medication Adjustments for Surgery: Stop 7 days before surgery     ketoconazole 2 % cream  Commonly known as: NIZOral  Medication Adjustments for Surgery: Continue until night before surgery     KRILL OIL (OMEGA 3 AND 6) ORAL  Medication Adjustments for Surgery: Stop 7 days before surgery     levothyroxine 150 mcg tablet  Commonly known as: Synthroid, Levoxyl  Take 1 tablet (150 mcg) by mouth once daily.  Medication Adjustments for Surgery: Take morning of surgery with sip of water, no other fluids     losartan 50 mg tablet  Commonly known as: Cozaar  TAKE 1 TABLET BY MOUTH ONCE  DAILY  Notes to patient: HOLD evening prior to surgery and morning of surgery        Magnesium (oxide/AA chelate) 300 mg capsule  Generic drug: magnesium oxide-Mg AA chelate  Medication Adjustments for Surgery: Continue until night before surgery     melatonin 3 mg tablet  Notes to patient: Ok to use night before and night of surgery     montelukast 10 mg tablet  Commonly known as: Singulair  TAKE 1 TABLET BY MOUTH DAILY AS  DIRECTED  Medication Adjustments for Surgery: Take morning of surgery with sip of water, no other fluids     Mucinex 1,200 mg tablet extended release 12hr  Generic drug: guaiFENesin  Medication Adjustments for Surgery: Take morning of surgery with  sip of water, no other fluids  Notes to patient: If needed     multivitamin with minerals tablet  Medication Adjustments for Surgery: Stop 7 days before surgery     nystatin 100,000 unit/gram powder  Commonly known as: Mycostatin  Medication Adjustments for Surgery: Continue until night before surgery     omeprazole 40 mg DR capsule  Commonly known as: PriLOSEC  Take 1 capsule (40 mg) by mouth 2 times a day.  Medication Adjustments for Surgery: Take morning of surgery with sip of water, no other fluids     * semaglutide 0.25 mg or 0.5 mg (2 mg/3 mL) pen injector  Inject 0.25 mg under the skin 1 (one) time per week for 28 days, THEN 0.5 mg 1 (one) time per week for 14 days.  Start taking on: June 2, 2024  Medication Adjustments for Surgery: Stop 7 days before surgery     * semaglutide 0.25 mg or 0.5 mg (2 mg/3 mL) pen injector  Inject 0.5 mg under the skin 1 (one) time per week.  Medication Adjustments for Surgery: Stop 7 days before surgery     simethicone 80 mg chewable tablet  Commonly known as: Mylicon  Medication Adjustments for Surgery: Continue until night before surgery     triamterene-hydrochlorothiazid 37.5-25 mg tablet  Commonly known as: Maxzide-25  TAKE 1 TABLET BY MOUTH DAILY  Medication Adjustments for Surgery: Take morning of surgery with sip of water, no other fluids     Tylenol Extra Strength 500 mg tablet  Generic drug: acetaminophen  Medication Adjustments for Surgery: Take morning of surgery with sip of water, no other fluids  Notes to patient: If needed     VITAMIN C ORAL  Medication Adjustments for Surgery: Stop 7 days before surgery     Voltaren Arthritis Pain 1 % gel  Generic drug: diclofenac sodium  Medication Adjustments for Surgery: Continue until night before surgery     Wixela Inhub 250-50 mcg/dose diskus inhaler  Generic drug: fluticasone propion-salmeteroL  Inhale 1 puff every 12 hours.  Notes to patient: Ok to use morning of surgery      zinc sulfate 220 (50 Zn) MG capsule  Commonly  known as: Zincate  Medication Adjustments for Surgery: Stop 7 days before surgery           * This list has 4 medication(s) that are the same as other medications prescribed for you. Read the directions carefully, and ask your doctor or other care provider to review them with you.                              **Concerning above medication instructions, if medication is normally taken at night, continue normal schedule.**  **DO NOT TAKE NIGHT PRIOR AND MORNING OF SURGERY**    CONTACT SURGEON'S OFFICE IF YOU DEVELOP:  * Fever = 100.4 F   * New respiratory symptoms (e.g. cough, shortness of breath, respiratory distress, sore throat)  * Recent loss of taste or smell  *Flu like symptoms such as headache, fatigue or gastrointestinal symptoms  * You develop any open sores, shingles, burning or painful urination   AND/OR:  * You no longer wish to have the surgery.  * Any other personal circumstances change that may lead to the need to cancel or defer this surgery.  *You were admitted to any hospital within one week of your planned procedure.    SMOKING:  *Quitting smoking can make a huge difference to your health and recovery from surgery.    *If you need help with quitting, call 0-523-QUIT-NOW.    THE DAY BEFORE SURGERY:   Nothing by mouth (no solids or liquids) after midnight.   If diabetic, please check fasting blood sugar upon waking up.    If fasting sugar is <80 mg/dl, please drink 100ml/3oz of apple juice no later than 2 hours prior to arrival time.      SURGICAL TIME  *You will be contacted between 2 p.m. and 6 p.m. the business day before your surgery with your arrival time.  *If you haven't received a call by 6pm, call [phone].  *Scheduled surgery times may change and you will be notified if this occurs-check your personal voicemail for any updates.    ON THE MORNING OF SURGERY:  *Wear comfortable, loose fitting clothing.   *Do not use moisturizers, creams, lotions or perfume.  *All jewelry and valuables  should be left at home.  *Prosthetic devices such as contact lenses, hearing aids, dentures, eyelash extensions, hairpins and body piercing must be removed before surgery.    BRING WITH YOU:  *Photo ID and insurance card  *Current list of medicines and allergies  *Pacemaker/Defibrillator/Heart stent cards  *CPAP machine and mask  *Slings/splints/crutches  *Copy of your complete Advanced Directive/DHPOA-if applicable  *Neurostimulator implant remote    PARKING AND ARRIVAL:  *Check in at the Main Entrance desk and let them know you are here for surgery.  *You will be directed to the 2nd floor surgical waiting area.    AFTER OUTPATIENT SURGERY:  *A responsible adult MUST accompany you at the time of discharge and stay with you for 24 hours after your surgery.  *You may NOT drive yourself home after surgery.  *You may use a taxi or ride sharing service ([employer], Uber) to return home ONLY if you are accompanied by a friend or family member.  *Instructions for resuming your medications will be provided by your surgeon.        Patient Information: Oral/Dental Rinse  **This is a prescription; pick it up at your preferred local pharmacy **  What is oral/dental rinse?   It is a mouthwash. It is a way of cleaning the mouth with a germ killing solution before your surgery.  The solution contains chlorhexidine, commonly known as CHG.   It is used inside the mouth to kill a bacteria known as Staphylococcus aureus.  Let your doctor know if you are allergic to Chlorhexidine.    Why do I need to use CHG oral/dental rinse?  The CHG oral/dental rinse helps to kill a bacteria in your mouth known a Staphylococcus aureus.     This reduces the risk of infection at the surgical site.      Using your CHG oral/dental rinse  STEPS:  Use your CHG oral/dental rinse after you brush your teeth the night before (at bedtime) and the morning of your surgery.  Follow all directions on your prescription label.    Use the cap on the container to measure  15ml (fill cap to fill line)  Swish (gargle if you can) the mouthwash in your mouth for at least 30 seconds, (do not to swallow) spit out  After you use your CHG rinse, do not rinse your mouth with water, drink or eat.  Please refer to prescription label for the appropriate time to resume oral intake  Dental rinse comes in one size bottle: 473ml ~16oz.  You will have leftover    rinse, discard after this use.    What side effects might I have using the CHG oral/dental rinse?  CHG rinse will stick to plaque on the teeth.  Brush and floss just before use.  Teeth brushing will help avoid staining of plaque during use.    Who should I contact if I have questions about the CHG oral/dental rinse?  Please call [facility], Preadmission Testing at [phone] if you have any questions     Home Preoperative Antibacterial Shower     What is a home preoperative antibacterial shower?  This shower is a way of cleaning the skin with a germ killing soap before surgery.  The soap contains chlorhexidine, commonly known as CHG.  CHG is a soap for your skin with germ killing ability.  Let your doctor know if you are allergic to chlorhexidine.    Why do I need to take a preoperative antibacterial shower?  Skin is not sterile.  It is best to try to make your skin as free of germs as possible before surgery.  Proper cleansing with a germ killing soap before surgery can lower the number of germs on your skin.  This helps to reduce the risk of infection at the surgical site.  Following the instructions listed below will help you prepare your skin for surgery.      How do I use the CHG skin cleanser?  Steps:  Begin using your CHG soap five days before your scheduled surgery on ________________________.    Days 1-4 Shower before bed:  Wash your face and genitals with your normal soap and rinse.    Wash and rinse your hair using the CHG soap. Rinse completely, do not condition your   Hair.          3.    Apply  the CHG soap to a clean wet washcloth.  Turn the water off or move away                From the water spray to avoid premature rinsing of the CHG soap as you are applying.     4.   Lather your entire body from the neck down.  Do not use on your face or genitals.   Pay special attention to the area(s) where your incision(s) will be located unless they are on your face.  Avoid scrubbing your skin too hard.  The important point is to have the CHG soap sit on your skin for 3 minutes.    When the 3 minutes are up, turn on the water and rinse the CHG soap off your body completely.   Pat yourself dry with a clean, freshly-laundered towel.  Dress in clean, freshly laundered night clothes.    Be sure to sleep with clean, freshly laundered sheets.  Day 5:  Last shower is the morning before surgery: Follow above Instructions.    NOTE:    *Hair extensions should be removed    *Keep CHG soap out of eyes and ear canals   *DO NOT wash with regular soap on your body after you have used the CHG        soap solution  *DO NOT apply powders, lotions, or perfume.  *Deodorant may be used days 1-4, BUT NOT the day of surgery    Who should I contact if I have any questions regarding the use of CHG soap?  Call the [facility], Preadmission Testing at [phone] if you have any questions.              Patient Information: Pre-Operative Infection Prevention Measures     Why did I have my nose, under my arms and groin swabbed?  The purpose of the swab is to identify Staphylococcus aureus inside your nose or on your skin.  The swab was sent to the laboratory for culture.  A positive swab/culture for Staphylococcus aureus is called colonization or carriage.      What is Staphylococcus aureus?  Staphylococcus aureus, also known as “staph”, is a germ found on the skin or in the nose of healthy people.  Sometimes Staphylococcus aureus can get into the body and cause an infection.  This can be minor (such as pimples,  boils or other skin problems).  It might also be serious (such as blood infection, pneumonia or a surgical site infection).    What is Staphylococcus aureus colonization or carriage?  Colonization or carriage means that a person has the germ but is not sick from it.  These bacteria can be spread on the hands or when breathing or sneezing.    How is Staphylococcus aureus spread?  It is most often spread by close contact with a person or item that carries it.    What happens if my culture is positive for Staphylococcus aureus?  Your doctor/medical team will use this information to guide any antibiotic treatment which may be necessary.  Regardless of the culture results, we will clean the inside of your nose with a betadine swab just before you have your surgery.      Will I get an infection if I have Staphylococcus aureus in my nose or on my skin?  Anyone can get an infection with Staphylococcus aureus.  However, the best way to reduce your risk of infection is to follow the instructions provided to you for the use of your CHG soap and dental rinse.        Who should I contact if I have any questions?  Call the [facility], Preadmission Testing at [phone] if you have any questions.

## 2024-07-18 NOTE — CPM/PAT H&P
CPM/PAT Evaluation       Name: Mary See (Mary See)  /Age: 1950/74 y.o.     { PAT Visit Type:26456}      Chief Complaint: ***    HPI    Past Medical History:   Diagnosis Date    Abnormal amount of sputum     Mucinex BID    Anemia     Anxiety     Asthma, moderate persistent (HHS-HCC)     controlled on Wixela    Carpal tunnel syndrome, bilateral upper limbs 2020    Severe carpal tunnel syndrome of both wrists    Depression     Fibromyalgia     Gastric ulcer     GERD (gastroesophageal reflux disease)     Hyperlipidemia     Hypertension     Hypothyroidism     IBS (irritable bowel syndrome)     Inadequate sleep hygiene 10/01/2018    Inadequate sleep hygiene    Morbid obesity (Multi)     OA (osteoarthritis)     PTSD (post-traumatic stress disorder)     PVC (premature ventricular contraction)     Remote PVCs, PACs    Scoliosis     Sleep apnea     CPAP setting 10-20, keeps HOB elevated 33 degrees    Spinal stenosis     Submandibular abscess 2021    Vocal cord dysfunction     w/ chronic upper airway cough and reccurent laryngospasm       Past Surgical History:   Procedure Laterality Date    ABSCESS DRAINAGE      submandibular    ANKLE SURGERY Right     CARPAL TUNNEL RELEASE      CATARACT EXTRACTION Bilateral 10/01/2018     SECTION, CLASSIC  1987     Section    COLONOSCOPY      HERNIA REPAIR      Inguinal Hernia Repair    HIP DEBRIDEMENT      s/p replacement    LASIK  2018    ROTATOR CUFF REPAIR Left     TONSILLECTOMY  89    Tonsillectomy    TOTAL HIP ARTHROPLASTY Right     TOTAL KNEE ARTHROPLASTY      , 2016    TOTAL SHOULDER ARTHROPLASTY Right 2022    TRACHEOSTOMY CLOSURE      TRACHEOSTOMY TUBE PLACEMENT  2021       Patient  has no history on file for sexual activity.    Family History   Problem Relation Name Age of Onset    Cancer Mother          ADRENAL GLAND; DIGESTIVE ORGAN    Other (ANXIETY AND DEPRESSION) Mother      Alcohol abuse Father       Liver cancer Father      Ovarian cancer Sister      Dilated cardiomyopathy Sister          NONISCHEMIC       Allergies   Allergen Reactions    Ibuprofen Shortness of breath and Blurred vision     GI UPSET  tachycardia    Gluten Unknown     bloating,pain ,gas and diarrrhea    Adhesive Tape-Silicones Itching     incision glue, redness, blisters    Lactose GI Upset    Miconazole Hives    Soap Rash     Dial soap       Prior to Admission medications    Medication Sig Start Date End Date Taking? Authorizing Provider   acetaminophen (Tylenol Extra Strength) 500 mg tablet Take 2 tablets (1,000 mg) by mouth every 4 hours if needed.   Yes Historical Provider, MD   albuterol 2.5 mg /3 mL (0.083 %) nebulizer solution Take 3 mL (2.5 mg) by nebulization every 8 hours if needed. 5/5/14  Yes Historical Provider, MD   albuterol 90 mcg/actuation inhaler USE 2 INHALATIONS BY MOUTH  EVERY 6 HOURS AS NEEDED FOR SHORTNESS OF BREATH 11/7/23  Yes Skip Sal MD   ALPRAZolam XR (Xanax XR) 0.5 mg 24 hr tablet Take 1 tablet (0.5 mg) by mouth once daily. 6/26/24 9/24/24 Yes Skip Sal MD   ascorbic acid (VITAMIN C ORAL) Take 1 tablet by mouth once daily.   Yes Historical Provider, MD   calcium carbonate-vitamin D3 600 mg-20 mcg (800 unit) tablet Take 1 tablet by mouth 2 times a day.   Yes Historical Provider, MD   carboxymethylcellulose-glycern 0.5-0.9 % drops Use 1-2 Drops in both eyes as needed.   Yes Historical Provider, MD   cetirizine (ZyrTEC) 10 mg tablet Take 1 tablet (10 mg) by mouth once daily.   Yes Historical Provider, MD   cholecalciferol (Vitamin D-3) 125 MCG (5000 UT) capsule Take 1 capsule (125 mcg) by mouth once daily.   Yes Historical Provider, MD   citalopram (CeleXA) 40 mg tablet Take 0.5 tablets (20 mg) by mouth once daily.  Patient taking differently: Take 1 tablet (40 mg) by mouth once daily. 5/6/24 7/18/24 Yes Juanis Knight MD   diclofenac sodium (Voltaren Arthritis Pain) 1 % gel Apply 4.5 inches (4 g) topically  2 times a day as needed.   Yes Historical Provider, MD   docusate sodium (Colace) 100 mg capsule Take 1 capsule (100 mg) by mouth 2 times a day as needed for constipation.   Yes Historical Provider, MD   estradiol (Estrace) 0.1 MG/GM vaginal cream Insert 1 Applicatorful into the vagina 3 times a week. 8/17/11  Yes Historical Provider, MD   fluticasone (Flonase) 50 mcg/actuation nasal spray Administer 2 sprays into each nostril once daily. 5/30/23 7/18/24 Yes Skip Sal MD   furosemide (Lasix) 20 mg tablet Take 1 tablet (20 mg) by mouth once daily as needed (edema). 6/26/24 6/26/25 Yes Skip Sal MD   gabapentin (Neurontin) 300 mg capsule Take 1 capsule (300 mg) by mouth 2 times a day. 5/28/24  Yes Skip Sal MD   glucosamine-chondroit-vit C-Mn (Glucosamine Chondroitin MaxStr) 500-400 mg capsule Take 1,500 mg by mouth once daily.   Yes Historical Provider, MD   guaiFENesin (Mucinex) 1,200 mg tablet extended release 12hr Take 1 tablet (1,200 mg) by mouth every 12 hours. FOR CONGESTION   Yes Historical Provider, MD   krill/om3/dha/epa/om6/lip/astx (KRILL OIL, OMEGA 3 AND 6, ORAL) Take 1 capsule by mouth once daily.   Yes Historical Provider, MD   levothyroxine (Synthroid, Levoxyl) 150 mcg tablet Take 1 tablet (150 mcg) by mouth once daily. 5/30/23 7/18/24 Yes Skip Sal MD   losartan (Cozaar) 50 mg tablet TAKE 1 TABLET BY MOUTH ONCE  DAILY 4/25/24  Yes Skip Sal MD   magnesium oxide-Mg AA chelate (Magnesium, oxide/AA chelate,) 300 mg capsule Take 1 capsule (300 mg) by mouth once daily.   Yes Historical Provider, MD   melatonin 3 mg tablet Take 10 mg by mouth once daily at bedtime. 5/5/22  Yes Historical Provider, MD   montelukast (Singulair) 10 mg tablet TAKE 1 TABLET BY MOUTH DAILY AS  DIRECTED 2/5/24  Yes Skip Sal MD   multivitamin with minerals (multivit-min-iron fum-folic ac) tablet Take 1 tablet by mouth once daily. 9/30/14  Yes Historical Provider, MD   nystatin (Mycostatin)  100,000 unit/gram powder APPLY 2-3 TIMES DAILY TO AFFECTED AREA(S).  As needed. 12/9/19  Yes Historical Provider, MD   omeprazole (PriLOSEC) 40 mg DR capsule Take 1 capsule (40 mg) by mouth 2 times a day. 5/30/23 7/18/24 Yes Skip Sal MD   semaglutide 0.25 mg or 0.5 mg (2 mg/3 mL) pen injector Inject 0.5 mg under the skin 1 (one) time per week. 7/15/24  Yes Skip Sal MD   triamterene-hydrochlorothiazid (Maxzide-25) 37.5-25 mg tablet TAKE 1 TABLET BY MOUTH DAILY 2/5/24  Yes Skip Sal MD   Wixela Inhub 250-50 mcg/dose diskus inhaler Inhale 1 puff every 12 hours. 5/30/23 7/18/24 Yes Skip Sal MD   zinc sulfate (Zincate) 220 (50 Zn) MG capsule Take 1 capsule (50 mg of elemental zinc) by mouth once daily.   Yes Historical Provider, MD   chlorhexidine (Peridex) 0.12 % solution Swish for 30 seconds and spit 15mL of solution the night before and morning of surgery 7/18/24   Kenzie Alanis PA-C   clobetasol (Temovate) 0.05 % cream APPLY TO AFFECTED AREA(S) TOPICALLY ONCE DAILY FOR 1 WEEK, THEN TWICE WEEKLY FOR MAINTENANCE.    Historical Provider, MD   ketoconazole (NIZOral) 2 % cream Apply topically twice a day. 9/4/20   Historical Provider, MD   semaglutide 0.25 mg or 0.5 mg (2 mg/3 mL) pen injector Inject 0.25 mg under the skin 1 (one) time per week for 28 days, THEN 0.5 mg 1 (one) time per week for 14 days. 6/2/24 7/14/24  Skip Sal MD   simethicone (Mylicon) 80 mg chewable tablet Chew 1 tablet (80 mg) every 6 hours if needed.    Historical Provider, MD   semaglutide 0.25 mg or 0.5 mg (2 mg/3 mL) pen injector Inject 0.5 mg under the skin 1 (one) time per week.  Patient taking differently: Inject 0.5 mg under the skin 1 (one) time per week. TAKES ON WEDNESDAYS, LAST DOSE 7/10/24 6/23/24 7/15/24  MD JUVE Saavedra Physical Exam     Airway    Visit Vitals  /50   Pulse 66   Temp 36.5 °C (97.7 °F)   Resp 18       DASI Risk Score    No data to display       Caprini  DVT Assessment    No data to display       Modified Frailty Index    No data to display       CHADS2 Stroke Risk  Current as of 5 minutes ago        N/A 3 to 100%: High Risk   2 to < 3%: Medium Risk   0 to < 2%: Low Risk     Last Change: N/A          This score determines the patient's risk of having a stroke if the patient has atrial fibrillation.        This score is not applicable to this patient. Components are not calculated.          Revised Cardiac Risk Index    No data to display       Apfel Simplified Score    No data to display       Risk Analysis Index Results This Encounter    No data found in the last 1 encounters.         Assessment and Plan:     {[facility] EMBEDDED ASSESSMENT AND PLAN:29889}    .mrsa

## 2024-07-18 NOTE — CPM/PAT H&P
[facility]/PAT Evaluation       Name: Mary See (Mary See)  /Age: 1950/74 y.o.       Date of Consult: 24     Referring Provider: Dr. Nix    Surgery, Date, and Length: Left Shoulder Reverse Total Arthroplasty - Left , 24, 150MIN    Mary See is a 74 year-old female who presents to the [facility] for perioperative risk assessment prior to surgery.    Patient presents with a primary diagnosis of left shoulder osteoarthritis. Pt refers to pain in left shoulder for many years.  Pt refers to limited ROM in left shoulder. Pain is waking her up at night. She has tried steroid joint injections which provide only 1 hour pain relief.  She wishes to proceed with TJR of shoulder as planned.     This note was created in part upon personal review of patient's medical records.      Patient is scheduled to have Left Shoulder Reverse Total Arthroplasty - Left       Pt denies any past history of anesthetic complications such as PONV, awareness, prolonged sedation, dental damage, aspiration, cardiac arrest, difficult intubation, difficult I.V. access or unexpected hospital admissions.  NO malignant hyperthermia and or pseudocholinesterase deficiency.  No history of blood transfusions     The patient is not a [employer] and will accept blood and blood products if medically indicated.   Type and screen sent.      Past Medical History:   Diagnosis Date    Abnormal amount of sputum     Mucinex BID    Anemia     Anxiety     Asthma, moderate persistent (HHS-HCC)     controlled on Wixela    Carpal tunnel syndrome, bilateral upper limbs 2020    Severe carpal tunnel syndrome of both wrists    Depression     Fibromyalgia     Gastric ulcer     GERD (gastroesophageal reflux disease)     Hyperlipidemia     Hypertension     Hypothyroidism     IBS (irritable bowel syndrome)     Inadequate sleep hygiene 10/01/2018    Inadequate sleep hygiene    Morbid obesity (Multi)     OA (osteoarthritis)     PTSD  (post-traumatic stress disorder)     PVC (premature ventricular contraction)     Remote PVCs, PACs    Scoliosis     Sleep apnea     CPAP setting 10-20, keeps HOB elevated 33 degrees    Spinal stenosis     Submandibular abscess 2021    Vocal cord dysfunction     w/ chronic upper airway cough and reccurent laryngospasm       Past Surgical History:   Procedure Laterality Date    ABSCESS DRAINAGE      submandibular    ANKLE SURGERY Right     CARPAL TUNNEL RELEASE      CATARACT EXTRACTION Bilateral 10/01/2018     SECTION, CLASSIC  1987     Section    COLONOSCOPY      HERNIA REPAIR      Inguinal Hernia Repair    HIP DEBRIDEMENT      s/p replacement    LASIK      ROTATOR CUFF REPAIR Left     TONSILLECTOMY      Tonsillectomy    TOTAL HIP ARTHROPLASTY Right     TOTAL KNEE ARTHROPLASTY      , 2016    TOTAL SHOULDER ARTHROPLASTY Right 2022    TRACHEOSTOMY CLOSURE      TRACHEOSTOMY TUBE PLACEMENT  2021       Patient  has no history on file for sexual activity.    Family History   Problem Relation Name Age of Onset    Cancer Mother          ADRENAL GLAND; DIGESTIVE ORGAN    Other (ANXIETY AND DEPRESSION) Mother      Alcohol abuse Father      Liver cancer Father      Ovarian cancer Sister      Dilated cardiomyopathy Sister          NONISCHEMIC     Social History     Socioeconomic History    Marital status:      Spouse name: Not on file    Number of children: Not on file    Years of education: Not on file    Highest education level: Not on file   Occupational History    Not on file   Tobacco Use    Smoking status: Former     Current packs/day: 0.00     Average packs/day: 1 pack/day for 7.0 years (7.0 ttl pk-yrs)     Types: Cigarettes     Start date:      Quit date:      Years since quittin.5     Passive exposure: Never    Smokeless tobacco: Never   Vaping Use    Vaping status: Never Used   Substance and Sexual Activity    Alcohol use: Never    Drug use:  Never    Sexual activity: Not on file   Other Topics Concern    Not on file   Social History Narrative    Not on file     Social Determinants of Health     Financial Resource Strain: Not on file   Food Insecurity: Not on file   Transportation Needs: Not on file   Physical Activity: Not on file   Stress: Not on file   Social Connections: Unknown (5/19/2024)    Received from [facility], [facility] Social Needs Screening - Social Connection     Would you like help with any of the following needs: food, medicine/medical supplies, transportation, loneliness, housing or utilities?: Not on file   Intimate Partner Violence: Not on file   Housing Stability: Not on file        Allergies   Allergen Reactions    Ibuprofen Shortness of breath and Blurred vision     GI UPSET  tachycardia    Gluten Unknown     bloating,pain ,gas and diarrrhea    Adhesive Tape-Silicones Itching     incision glue, redness, blisters    Lactose GI Upset    Miconazole Hives    Soap Rash     Dial soap       Prior to Admission medications    Medication Sig Start Date End Date Taking? Authorizing Provider   acetaminophen (Tylenol Extra Strength) 500 mg tablet Take 2 tablets (1,000 mg) by mouth every 4 hours if needed.   Yes Historical Provider, MD   albuterol 2.5 mg /3 mL (0.083 %) nebulizer solution Take 3 mL (2.5 mg) by nebulization every 8 hours if needed. 5/5/14  Yes Historical Provider, MD   albuterol 90 mcg/actuation inhaler USE 2 INHALATIONS BY MOUTH  EVERY 6 HOURS AS NEEDED FOR SHORTNESS OF BREATH 11/7/23  Yes Skip Sal MD   ALPRAZolam XR (Xanax XR) 0.5 mg 24 hr tablet Take 1 tablet (0.5 mg) by mouth once daily. 6/26/24 9/24/24 Yes Skip Sal MD   ascorbic acid (VITAMIN C ORAL) Take 1 tablet by mouth once daily.   Yes Historical Provider, MD   calcium carbonate-vitamin D3 600 mg-20 mcg (800 unit) tablet Take 1 tablet by mouth 2 times a day.   Yes Historical Provider, MD   carboxymethylcellulose-glycern 0.5-0.9 %  drops Use 1-2 Drops in both eyes as needed.   Yes Historical Provider, MD   cetirizine (ZyrTEC) 10 mg tablet Take 1 tablet (10 mg) by mouth once daily.   Yes Historical Provider, MD   cholecalciferol (Vitamin D-3) 125 MCG (5000 UT) capsule Take 1 capsule (125 mcg) by mouth once daily.   Yes Historical Provider, MD   citalopram (CeleXA) 40 mg tablet Take 0.5 tablets (20 mg) by mouth once daily.  Patient taking differently: Take 1 tablet (40 mg) by mouth once daily. 5/6/24 7/18/24 Yes Juanis Knight MD   diclofenac sodium (Voltaren Arthritis Pain) 1 % gel Apply 4.5 inches (4 g) topically 2 times a day as needed.   Yes Historical Provider, MD   docusate sodium (Colace) 100 mg capsule Take 1 capsule (100 mg) by mouth 2 times a day as needed for constipation.   Yes Historical Provider, MD   estradiol (Estrace) 0.1 MG/GM vaginal cream Insert 1 Applicatorful into the vagina 3 times a week. 8/17/11  Yes Historical Provider, MD   fluticasone (Flonase) 50 mcg/actuation nasal spray Administer 2 sprays into each nostril once daily. 5/30/23 7/18/24 Yes Skip Sal MD   furosemide (Lasix) 20 mg tablet Take 1 tablet (20 mg) by mouth once daily as needed (edema). 6/26/24 6/26/25 Yes Skip Sal MD   gabapentin (Neurontin) 300 mg capsule Take 1 capsule (300 mg) by mouth 2 times a day. 5/28/24  Yes Skip Sal MD   glucosamine-chondroit-vit C-Mn (Glucosamine Chondroitin MaxStr) 500-400 mg capsule Take 1,500 mg by mouth once daily.   Yes Historical Provider, MD   guaiFENesin (Mucinex) 1,200 mg tablet extended release 12hr Take 1 tablet (1,200 mg) by mouth every 12 hours. FOR CONGESTION   Yes Historical Provider, MD   krill/om3/dha/epa/om6/lip/astx (KRILL OIL, OMEGA 3 AND 6, ORAL) Take 1 capsule by mouth once daily.   Yes Historical Provider, MD   levothyroxine (Synthroid, Levoxyl) 150 mcg tablet Take 1 tablet (150 mcg) by mouth once daily. 5/30/23 7/18/24 Yes Skip Sal MD   losartan (Cozaar) 50 mg tablet TAKE 1 TABLET  BY MOUTH ONCE  DAILY 4/25/24  Yes Skip Sal MD   magnesium oxide-Mg AA chelate (Magnesium, oxide/AA chelate,) 300 mg capsule Take 1 capsule (300 mg) by mouth once daily.   Yes Historical Provider, MD   melatonin 3 mg tablet Take 10 mg by mouth once daily at bedtime. 5/5/22  Yes Historical Provider, MD   montelukast (Singulair) 10 mg tablet TAKE 1 TABLET BY MOUTH DAILY AS  DIRECTED 2/5/24  Yes Skip Sal MD   multivitamin with minerals (multivit-min-iron fum-folic ac) tablet Take 1 tablet by mouth once daily. 9/30/14  Yes Historical Provider, MD   nystatin (Mycostatin) 100,000 unit/gram powder APPLY 2-3 TIMES DAILY TO AFFECTED AREA(S).  As needed. 12/9/19  Yes Historical Provider, MD   omeprazole (PriLOSEC) 40 mg DR capsule Take 1 capsule (40 mg) by mouth 2 times a day. 5/30/23 7/18/24 Yes Skip Sal MD   semaglutide 0.25 mg or 0.5 mg (2 mg/3 mL) pen injector Inject 0.5 mg under the skin 1 (one) time per week. 7/15/24  Yes Skip Sal MD   triamterene-hydrochlorothiazid (Maxzide-25) 37.5-25 mg tablet TAKE 1 TABLET BY MOUTH DAILY 2/5/24  Yes Skip Sal MD   Wixela Inhub 250-50 mcg/dose diskus inhaler Inhale 1 puff every 12 hours. 5/30/23 7/18/24 Yes Skip Sal MD   zinc sulfate (Zincate) 220 (50 Zn) MG capsule Take 1 capsule (50 mg of elemental zinc) by mouth once daily.   Yes Historical Provider, MD   chlorhexidine (Peridex) 0.12 % solution Swish for 30 seconds and spit 15mL of solution the night before and morning of surgery 7/18/24   Kenzie Alanis PA-C   clobetasol (Temovate) 0.05 % cream APPLY TO AFFECTED AREA(S) TOPICALLY ONCE DAILY FOR 1 WEEK, THEN TWICE WEEKLY FOR MAINTENANCE.    Historical Provider, MD   ketoconazole (NIZOral) 2 % cream Apply topically twice a day. 9/4/20   Historical Provider, MD   semaglutide 0.25 mg or 0.5 mg (2 mg/3 mL) pen injector Inject 0.25 mg under the skin 1 (one) time per week for 28 days, THEN 0.5 mg 1 (one) time per week for 14 days. 6/2/24  7/14/24  Skip Sal MD   simethicone (Mylicon) 80 mg chewable tablet Chew 1 tablet (80 mg) every 6 hours if needed.    Historical Provider, MD   semaglutide 0.25 mg or 0.5 mg (2 mg/3 mL) pen injector Inject 0.5 mg under the skin 1 (one) time per week.  Patient taking differently: Inject 0.5 mg under the skin 1 (one) time per week. TAKES ON WEDNESDAYS, LAST DOSE 7/10/24 6/23/24 7/15/24  Skip Sal MD        PAT ROS:   Constitutional:    no fever   no chills   no unexpected weight change  Neuro/Psych:    no numbness   no weakness   no light-headedness   no confusion  Eyes:    no discharge   no pain   no vision loss   no diplopia   no visual disturbance   use of corrective lenses  Ears:    no ear pain   no hearing loss   no tinnitus  Nose:    no nasal discharge   no sinus congestion   no epistaxis  Mouth:    no dental issues   no mouth pain   no oral bleeding   no mouth lesions  Throat:    no throat pain   no dysphagia  Neck:    no neck pain   no neck stiffness  Cardio:    Functional 4 Mets. Patient denies SOB walking up 1 flights of stairs   PT exercises daily ; cooking, cleaning    no chest pain   no palpitations   no peripheral edema   no dyspnea   no OLSON  Respiratory:    no cough   no wheezing   no hemoptysis   no shortness of breath  Endocrine:    no cold intolerance   no heat intolerance  GI:    no abdominal distention   no abdominal pain   constipation   no diarrhea   no nausea   no vomiting   no blood in stool  :    no difficulty urinating   no dysuria   no oliguria  Musculoskeletal:    arthralgias (left shoulder)   no myalgias   decreased ROM (left shoulder)  Hematologic:    does not bruise/bleed easily   no excessive bleeding   no history of blood transfusion   no blood clots  Skin:   no skin changes   no sores/wound   no rash      Physical Exam  Constitutional:       General: She is not in acute distress.     Appearance: Normal appearance. She is obese. She is not ill-appearing, toxic-appearing  "or diaphoretic.   HENT:      Head: Normocephalic and atraumatic.      Nose: Nose normal. No rhinorrhea.      Mouth/Throat:      Pharynx: No oropharyngeal exudate.   Eyes:      Extraocular Movements: Extraocular movements intact.      Conjunctiva/sclera: Conjunctivae normal.   Cardiovascular:      Rate and Rhythm: Normal rate and regular rhythm.      Heart sounds: No murmur heard.     No friction rub. No gallop.      Comments: Functional 4 Mets. Patient denies SOB walking up 2 flights of stairs     Pulmonary:      Effort: Pulmonary effort is normal. No respiratory distress.      Breath sounds: Normal breath sounds. No stridor. No wheezing or rhonchi.   Abdominal:      General: Bowel sounds are normal. There is no distension.      Palpations: Abdomen is soft. There is no mass.      Tenderness: There is no abdominal tenderness. There is no guarding or rebound.      Hernia: No hernia is present.   Musculoskeletal:         General: Tenderness (left shoulder with decreased ROM with abduction of left arm) present. No swelling, deformity or signs of injury.      Cervical back: Normal range of motion and neck supple. No rigidity or tenderness.   Skin:     General: Skin is warm and dry.      Coloration: Skin is not jaundiced or pale.      Findings: No bruising, erythema, lesion or rash.   Neurological:      General: No focal deficit present.      Mental Status: She is alert and oriented to person, place, and time.      Cranial Nerves: No cranial nerve deficit.      Sensory: No sensory deficit.      Motor: Weakness (LUE) present.      Coordination: Coordination normal.   Psychiatric:         Mood and Affect: Mood normal.         Behavior: Behavior normal.          PAT AIRWAY:   Airway:     Mallampati::  II    Neck ROM::  Full   No broken teeth, no dentures and no missing teeth          Visit Vitals  /50   Pulse 66   Temp 36.5 °C (97.7 °F)   Resp 18   Ht 1.6 m (5' 3\")   Wt 101 kg (223 lb 8.7 oz)   SpO2 99%   BMI 39.60 " kg/m²   OB Status Postmenopausal   Smoking Status Former   BSA 2.12 m²      LABS:  Lab Results   Component Value Date    GLUCOSE 79 07/18/2024    CALCIUM 9.0 07/18/2024     07/18/2024    K 3.3 (L) 07/18/2024    CO2 29 07/18/2024    CL 98 07/18/2024    BUN 19 07/18/2024    CREATININE 0.90 07/18/2024      Lab Results   Component Value Date    WBC 6.3 07/18/2024    HGB 11.9 (L) 07/18/2024    HCT 36.5 07/18/2024    MCV 90 07/18/2024     07/18/2024      Lab Results   Component Value Date    HGBA1C 4.9 07/18/2024      EKG 7/18/24  NSR  Left axis deviation  Possible lateral infarct, age undetermined  Abnormal EKG   Vent rate = 62 bpm    Assessment and Plan:   Patient is a 74-year-old female scheduled for a Left Shoulder Reverse Total Arthroplasty - Left  with Dr. Nix on 7/26/24.    Patient has no active cardiac symptoms.   Patient denies any chest pain, tightness, heaviness, pressure, radiating pain, palpitations, irregular heartbeats, lightheadedness, cough, congestion, shortness of breath, OLSON, PND, near syncope, weight loss or gain.     RCRI  0  , 3.9 % Risk of MACE    Cardiac:  HTN  Losartan HOLD evening prior to surgery and morning of surgery       Encouraged lifestyle modifications, low-sodium diet, and increase activity as tolerated.  Monitor BP and follow up with managing physician for readings sustaining >140/90.    Lower extremity edema - hold furosemide dos    Pulmonary:  MALIHA- Known and treated  MALIHA- Patient was instructed to bring CPAP machine the morning of surgery. Recommend prioritizing  nonopioid analgesic techniques (regional and local anesthesia, nonsteroidal medications, etc) before the administration of opioids and close monitoring for hypoventilation after surgery due to MALIHA. Increased vigilance is recommended with the use of narcotics due to an increased risk for opioid induced respiratory depression       Psych:  Obesity - hold Ozempic 7 days prior to  surgery    Hematology:  Anemia  7/18/24 H/H  11.9/36.5%    Hypokalemia  7/18/24 K 3.3    Patient instructed to ambulate as soon as possible postoperatively to decrease thromboembolic risk.   Initiate mechanical DVT prophylaxis as soon as possible and initiate chemical prophylaxis when deemed safe from a bleeding standpoint post surgery.     LABS: CBC, BMP, T&S, MRSA, A1c, EKG ordered. Lab results reviewed and unremarkable.    Followup: MRSA and A1c pending    Addendum 7/19/24:  A1c results reviewed and wnl.    STOP BANG: +MALIHA    Caprini: 8    Risk assessment complete.  Patient is scheduled for a intermediate surgical risk procedure.        Preoperative medication instructions were provided and reviewed with the patient.  Any additional testing or evaluation was explained to the patient.  Nothing by mouth instructions were discussed and patient's questions were answered prior to conclusion to this encounter.  Patient verbalized understanding of preoperative instructions given in preadmission testing; discharge instructions available in EMR.    This note was dictated by a speech recognition.  Minor errors may have been detected in a speech recognition.

## 2024-07-20 LAB — STAPHYLOCOCCUS SPEC CULT: NORMAL

## 2024-07-24 DIAGNOSIS — J45.40 MODERATE PERSISTENT ASTHMA WITHOUT COMPLICATION (HHS-HCC): ICD-10-CM

## 2024-07-24 DIAGNOSIS — K21.9 GASTROESOPHAGEAL REFLUX DISEASE, UNSPECIFIED WHETHER ESOPHAGITIS PRESENT: ICD-10-CM

## 2024-07-24 DIAGNOSIS — E78.00 HYPERCHOLESTEROLEMIA: ICD-10-CM

## 2024-07-24 RX ORDER — LEVOTHYROXINE SODIUM 150 UG/1
150 TABLET ORAL DAILY
Qty: 90 TABLET | Refills: 3 | Status: ON HOLD | OUTPATIENT
Start: 2024-07-24

## 2024-07-24 RX ORDER — OMEPRAZOLE 40 MG/1
40 CAPSULE, DELAYED RELEASE ORAL 2 TIMES DAILY
Qty: 180 CAPSULE | Refills: 3 | Status: ON HOLD | OUTPATIENT
Start: 2024-07-24

## 2024-07-24 RX ORDER — FLUTICASONE PROPIONATE AND SALMETEROL 250; 50 UG/1; UG/1
1 POWDER RESPIRATORY (INHALATION) EVERY 12 HOURS
Qty: 180 EACH | Refills: 3 | Status: ON HOLD | OUTPATIENT
Start: 2024-07-24

## 2024-07-25 ENCOUNTER — APPOINTMENT (OUTPATIENT)
Dept: RADIOLOGY | Facility: CLINIC | Age: 74
End: 2024-07-25
Payer: MEDICARE

## 2024-07-26 ENCOUNTER — PHARMACY VISIT (OUTPATIENT)
Dept: PHARMACY | Facility: CLINIC | Age: 74
End: 2024-07-26
Payer: COMMERCIAL

## 2024-07-26 ENCOUNTER — ANESTHESIA EVENT (OUTPATIENT)
Dept: OPERATING ROOM | Facility: HOSPITAL | Age: 74
End: 2024-07-26
Payer: MEDICARE

## 2024-07-26 ENCOUNTER — HOSPITAL ENCOUNTER (OUTPATIENT)
Facility: HOSPITAL | Age: 74
Discharge: HOME | End: 2024-07-26
Attending: ORTHOPAEDIC SURGERY | Admitting: ORTHOPAEDIC SURGERY
Payer: MEDICARE

## 2024-07-26 ENCOUNTER — ANESTHESIA (OUTPATIENT)
Dept: OPERATING ROOM | Facility: HOSPITAL | Age: 74
End: 2024-07-26
Payer: MEDICARE

## 2024-07-26 ENCOUNTER — APPOINTMENT (OUTPATIENT)
Dept: RADIOLOGY | Facility: HOSPITAL | Age: 74
End: 2024-07-26
Payer: MEDICARE

## 2024-07-26 VITALS
OXYGEN SATURATION: 97 % | DIASTOLIC BLOOD PRESSURE: 62 MMHG | BODY MASS INDEX: 39.45 KG/M2 | TEMPERATURE: 97.5 F | HEIGHT: 63 IN | HEART RATE: 78 BPM | RESPIRATION RATE: 16 BRPM | SYSTOLIC BLOOD PRESSURE: 126 MMHG | WEIGHT: 222.66 LBS

## 2024-07-26 DIAGNOSIS — Z98.890 POST-OPERATIVE STATE: ICD-10-CM

## 2024-07-26 DIAGNOSIS — G89.18 ACUTE POST-OPERATIVE PAIN: Primary | ICD-10-CM

## 2024-07-26 DIAGNOSIS — M19.012 PRIMARY OSTEOARTHRITIS OF LEFT SHOULDER: Primary | ICD-10-CM

## 2024-07-26 PROCEDURE — 2500000005 HC RX 250 GENERAL PHARMACY W/O HCPCS

## 2024-07-26 PROCEDURE — 2720000007 HC OR 272 NO HCPCS: Performed by: ORTHOPAEDIC SURGERY

## 2024-07-26 PROCEDURE — 3700000002 HC GENERAL ANESTHESIA TIME - EACH INCREMENTAL 1 MINUTE: Performed by: ORTHOPAEDIC SURGERY

## 2024-07-26 PROCEDURE — 2780000003 HC OR 278 NO HCPCS: Performed by: ORTHOPAEDIC SURGERY

## 2024-07-26 PROCEDURE — 2500000005 HC RX 250 GENERAL PHARMACY W/O HCPCS: Performed by: NURSE PRACTITIONER

## 2024-07-26 PROCEDURE — 3700000001 HC GENERAL ANESTHESIA TIME - INITIAL BASE CHARGE: Performed by: ORTHOPAEDIC SURGERY

## 2024-07-26 PROCEDURE — 2500000004 HC RX 250 GENERAL PHARMACY W/ HCPCS (ALT 636 FOR OP/ED)

## 2024-07-26 PROCEDURE — 23430 REPAIR BICEPS TENDON: CPT | Performed by: NURSE PRACTITIONER

## 2024-07-26 PROCEDURE — 73020 X-RAY EXAM OF SHOULDER: CPT | Mod: LEFT SIDE | Performed by: RADIOLOGY

## 2024-07-26 PROCEDURE — 23430 REPAIR BICEPS TENDON: CPT | Performed by: ORTHOPAEDIC SURGERY

## 2024-07-26 PROCEDURE — 2500000004 HC RX 250 GENERAL PHARMACY W/ HCPCS (ALT 636 FOR OP/ED): Performed by: ANESTHESIOLOGY

## 2024-07-26 PROCEDURE — 97165 OT EVAL LOW COMPLEX 30 MIN: CPT | Mod: GO

## 2024-07-26 PROCEDURE — C1776 JOINT DEVICE (IMPLANTABLE): HCPCS | Performed by: ORTHOPAEDIC SURGERY

## 2024-07-26 PROCEDURE — 97535 SELF CARE MNGMENT TRAINING: CPT | Mod: GO

## 2024-07-26 PROCEDURE — 73020 X-RAY EXAM OF SHOULDER: CPT | Mod: LT

## 2024-07-26 PROCEDURE — C1713 ANCHOR/SCREW BN/BN,TIS/BN: HCPCS | Performed by: ORTHOPAEDIC SURGERY

## 2024-07-26 PROCEDURE — 7100000010 HC PHASE TWO TIME - EACH INCREMENTAL 1 MINUTE: Performed by: ORTHOPAEDIC SURGERY

## 2024-07-26 PROCEDURE — 23472 RECONSTRUCT SHOULDER JOINT: CPT | Performed by: NURSE PRACTITIONER

## 2024-07-26 PROCEDURE — 7100000001 HC RECOVERY ROOM TIME - INITIAL BASE CHARGE: Performed by: ORTHOPAEDIC SURGERY

## 2024-07-26 PROCEDURE — RXMED WILLOW AMBULATORY MEDICATION CHARGE

## 2024-07-26 PROCEDURE — 7100000011 HC EXTENDED STAY RECOVERY HOURLY - NURSING UNIT

## 2024-07-26 PROCEDURE — 3600000005 HC OR TIME - INITIAL BASE CHARGE - PROCEDURE LEVEL FIVE: Performed by: ORTHOPAEDIC SURGERY

## 2024-07-26 PROCEDURE — 7100000009 HC PHASE TWO TIME - INITIAL BASE CHARGE: Performed by: ORTHOPAEDIC SURGERY

## 2024-07-26 PROCEDURE — 2500000004 HC RX 250 GENERAL PHARMACY W/ HCPCS (ALT 636 FOR OP/ED): Mod: JZ | Performed by: NURSE PRACTITIONER

## 2024-07-26 PROCEDURE — 23472 RECONSTRUCT SHOULDER JOINT: CPT | Performed by: ORTHOPAEDIC SURGERY

## 2024-07-26 PROCEDURE — 7100000002 HC RECOVERY ROOM TIME - EACH INCREMENTAL 1 MINUTE: Performed by: ORTHOPAEDIC SURGERY

## 2024-07-26 PROCEDURE — 3600000010 HC OR TIME - EACH INCREMENTAL 1 MINUTE - PROCEDURE LEVEL FIVE: Performed by: ORTHOPAEDIC SURGERY

## 2024-07-26 DEVICE — SHOULDER INNOVATIONS INSET REVERSE TOTAL SHOULDER - HUMERAL TRAY. 38MM X 0MM.  TITANIUM
Type: IMPLANTABLE DEVICE | Site: SHOULDER | Status: FUNCTIONAL
Brand: SHOULDER INNOVATIONS INSET REVERSE TOTAL SHOULDER

## 2024-07-26 DEVICE — BEARINGS, RSA HUMERAL, NEUTRAL, 33MM: Type: IMPLANTABLE DEVICE | Site: SHOULDER | Status: FUNCTIONAL

## 2024-07-26 DEVICE — IMPLANTABLE DEVICE: Type: IMPLANTABLE DEVICE | Site: SHOULDER | Status: FUNCTIONAL

## 2024-07-26 DEVICE — SCREW, RSA LOCKING, 4.5 X 20MM, NS: Type: IMPLANTABLE DEVICE | Site: SHOULDER | Status: FUNCTIONAL

## 2024-07-26 DEVICE — GLENOSPHERE, RSA OFFSET, 33MM X 0MM +6MM: Type: IMPLANTABLE DEVICE | Site: SHOULDER | Status: FUNCTIONAL

## 2024-07-26 RX ORDER — DOCUSATE SODIUM 100 MG/1
100 CAPSULE, LIQUID FILLED ORAL 2 TIMES DAILY
Status: DISCONTINUED | OUTPATIENT
Start: 2024-07-26 | End: 2024-08-01 | Stop reason: HOSPADM

## 2024-07-26 RX ORDER — ONDANSETRON 4 MG/1
4 TABLET, FILM COATED ORAL EVERY 8 HOURS PRN
Status: DISCONTINUED | OUTPATIENT
Start: 2024-07-26 | End: 2024-08-01 | Stop reason: HOSPADM

## 2024-07-26 RX ORDER — NALOXONE HYDROCHLORIDE 1 MG/ML
0.2 INJECTION INTRAMUSCULAR; INTRAVENOUS; SUBCUTANEOUS EVERY 5 MIN PRN
Status: DISCONTINUED | OUTPATIENT
Start: 2024-07-26 | End: 2024-08-01 | Stop reason: HOSPADM

## 2024-07-26 RX ORDER — LIDOCAINE HYDROCHLORIDE 20 MG/ML
INJECTION, SOLUTION EPIDURAL; INFILTRATION; INTRACAUDAL; PERINEURAL AS NEEDED
Status: DISCONTINUED | OUTPATIENT
Start: 2024-07-26 | End: 2024-07-26

## 2024-07-26 RX ORDER — OXYCODONE HYDROCHLORIDE 5 MG/1
5 TABLET ORAL EVERY 4 HOURS PRN
Status: DISCONTINUED | OUTPATIENT
Start: 2024-07-26 | End: 2024-08-01 | Stop reason: HOSPADM

## 2024-07-26 RX ORDER — ONDANSETRON HYDROCHLORIDE 2 MG/ML
4 INJECTION, SOLUTION INTRAVENOUS EVERY 8 HOURS PRN
Status: DISCONTINUED | OUTPATIENT
Start: 2024-07-26 | End: 2024-08-01 | Stop reason: HOSPADM

## 2024-07-26 RX ORDER — OXYCODONE AND ACETAMINOPHEN 5; 325 MG/1; MG/1
1 TABLET ORAL EVERY 4 HOURS PRN
Status: DISCONTINUED | OUTPATIENT
Start: 2024-07-26 | End: 2024-08-01 | Stop reason: HOSPADM

## 2024-07-26 RX ORDER — ASPIRIN 81 MG/1
81 TABLET ORAL DAILY
Status: DISCONTINUED | OUTPATIENT
Start: 2024-07-27 | End: 2024-08-01 | Stop reason: HOSPADM

## 2024-07-26 RX ORDER — PROPOFOL 10 MG/ML
INJECTION, EMULSION INTRAVENOUS AS NEEDED
Status: DISCONTINUED | OUTPATIENT
Start: 2024-07-26 | End: 2024-07-26

## 2024-07-26 RX ORDER — TRANEXAMIC ACID 100 MG/ML
INJECTION, SOLUTION INTRAVENOUS AS NEEDED
Status: DISCONTINUED | OUTPATIENT
Start: 2024-07-26 | End: 2024-07-26

## 2024-07-26 RX ORDER — OXYCODONE AND ACETAMINOPHEN 5; 325 MG/1; MG/1
1 TABLET ORAL EVERY 6 HOURS PRN
Qty: 24 TABLET | Refills: 0 | Status: SHIPPED | OUTPATIENT
Start: 2024-07-26 | End: 2024-08-01

## 2024-07-26 RX ORDER — ONDANSETRON HYDROCHLORIDE 2 MG/ML
INJECTION, SOLUTION INTRAVENOUS AS NEEDED
Status: DISCONTINUED | OUTPATIENT
Start: 2024-07-26 | End: 2024-07-26

## 2024-07-26 RX ORDER — SODIUM CHLORIDE, SODIUM LACTATE, POTASSIUM CHLORIDE, CALCIUM CHLORIDE 600; 310; 30; 20 MG/100ML; MG/100ML; MG/100ML; MG/100ML
50 INJECTION, SOLUTION INTRAVENOUS CONTINUOUS
Status: DISCONTINUED | OUTPATIENT
Start: 2024-07-26 | End: 2024-08-01 | Stop reason: HOSPADM

## 2024-07-26 RX ORDER — ACETAMINOPHEN 325 MG/1
650 TABLET ORAL EVERY 6 HOURS SCHEDULED
Status: DISCONTINUED | OUTPATIENT
Start: 2024-07-26 | End: 2024-08-01 | Stop reason: HOSPADM

## 2024-07-26 RX ORDER — CEFAZOLIN SODIUM 2 G/100ML
2 INJECTION, SOLUTION INTRAVENOUS ONCE
Status: COMPLETED | OUTPATIENT
Start: 2024-07-26 | End: 2024-07-26

## 2024-07-26 RX ORDER — SODIUM CHLORIDE, SODIUM LACTATE, POTASSIUM CHLORIDE, CALCIUM CHLORIDE 600; 310; 30; 20 MG/100ML; MG/100ML; MG/100ML; MG/100ML
100 INJECTION, SOLUTION INTRAVENOUS CONTINUOUS
Status: DISCONTINUED | OUTPATIENT
Start: 2024-07-26 | End: 2024-08-01 | Stop reason: HOSPADM

## 2024-07-26 RX ORDER — MIDAZOLAM HYDROCHLORIDE 1 MG/ML
INJECTION, SOLUTION INTRAMUSCULAR; INTRAVENOUS AS NEEDED
Status: DISCONTINUED | OUTPATIENT
Start: 2024-07-26 | End: 2024-07-26

## 2024-07-26 RX ORDER — DROPERIDOL 2.5 MG/ML
0.62 INJECTION, SOLUTION INTRAMUSCULAR; INTRAVENOUS ONCE AS NEEDED
Status: DISCONTINUED | OUTPATIENT
Start: 2024-07-26 | End: 2024-08-01 | Stop reason: HOSPADM

## 2024-07-26 RX ORDER — HYDRALAZINE HYDROCHLORIDE 20 MG/ML
5 INJECTION INTRAMUSCULAR; INTRAVENOUS EVERY 30 MIN PRN
Status: DISCONTINUED | OUTPATIENT
Start: 2024-07-26 | End: 2024-08-01 | Stop reason: HOSPADM

## 2024-07-26 RX ORDER — CEFAZOLIN 1 G/1
INJECTION, POWDER, FOR SOLUTION INTRAVENOUS AS NEEDED
Status: DISCONTINUED | OUTPATIENT
Start: 2024-07-26 | End: 2024-07-26

## 2024-07-26 RX ORDER — ONDANSETRON HYDROCHLORIDE 2 MG/ML
4 INJECTION, SOLUTION INTRAVENOUS ONCE AS NEEDED
Status: DISCONTINUED | OUTPATIENT
Start: 2024-07-26 | End: 2024-08-01 | Stop reason: HOSPADM

## 2024-07-26 RX ORDER — METHOCARBAMOL 100 MG/ML
INJECTION, SOLUTION INTRAMUSCULAR; INTRAVENOUS AS NEEDED
Status: DISCONTINUED | OUTPATIENT
Start: 2024-07-26 | End: 2024-07-26

## 2024-07-26 RX ORDER — OXYCODONE AND ACETAMINOPHEN 5; 325 MG/1; MG/1
0.5 TABLET ORAL EVERY 4 HOURS PRN
Status: DISCONTINUED | OUTPATIENT
Start: 2024-07-26 | End: 2024-08-01 | Stop reason: HOSPADM

## 2024-07-26 RX ORDER — ROCURONIUM BROMIDE 10 MG/ML
INJECTION, SOLUTION INTRAVENOUS AS NEEDED
Status: DISCONTINUED | OUTPATIENT
Start: 2024-07-26 | End: 2024-07-26

## 2024-07-26 RX ORDER — ASPIRIN 81 MG/1
81 TABLET ORAL DAILY
Qty: 14 TABLET | Refills: 0 | Status: SHIPPED | OUTPATIENT
Start: 2024-07-26 | End: 2024-08-09

## 2024-07-26 RX ORDER — PROCHLORPERAZINE EDISYLATE 5 MG/ML
5 INJECTION INTRAMUSCULAR; INTRAVENOUS ONCE AS NEEDED
Status: DISCONTINUED | OUTPATIENT
Start: 2024-07-26 | End: 2024-08-01 | Stop reason: HOSPADM

## 2024-07-26 RX ORDER — FENTANYL CITRATE 50 UG/ML
INJECTION, SOLUTION INTRAMUSCULAR; INTRAVENOUS AS NEEDED
Status: DISCONTINUED | OUTPATIENT
Start: 2024-07-26 | End: 2024-07-26

## 2024-07-26 SDOH — HEALTH STABILITY: MENTAL HEALTH: CURRENT SMOKER: 1

## 2024-07-26 ASSESSMENT — PAIN - FUNCTIONAL ASSESSMENT
PAIN_FUNCTIONAL_ASSESSMENT: 0-10
PAIN_FUNCTIONAL_ASSESSMENT: UNABLE TO SELF-REPORT
PAIN_FUNCTIONAL_ASSESSMENT: 0-10

## 2024-07-26 ASSESSMENT — PAIN SCALES - GENERAL
PAINLEVEL_OUTOF10: 0 - NO PAIN
PAINLEVEL_OUTOF10: 7
PAINLEVEL_OUTOF10: 0 - NO PAIN

## 2024-07-26 ASSESSMENT — ACTIVITIES OF DAILY LIVING (ADL): HOME_MANAGEMENT_TIME_ENTRY: 15

## 2024-07-26 ASSESSMENT — COLUMBIA-SUICIDE SEVERITY RATING SCALE - C-SSRS
6. HAVE YOU EVER DONE ANYTHING, STARTED TO DO ANYTHING, OR PREPARED TO DO ANYTHING TO END YOUR LIFE?: NO
2. HAVE YOU ACTUALLY HAD ANY THOUGHTS OF KILLING YOURSELF?: NO
1. IN THE PAST MONTH, HAVE YOU WISHED YOU WERE DEAD OR WISHED YOU COULD GO TO SLEEP AND NOT WAKE UP?: NO

## 2024-07-26 NOTE — ANESTHESIA PREPROCEDURE EVALUATION
Patient: Mary See    Procedure Information       Date/Time: 24 1230    Procedure: Left Shoulder Reverse Total Arthroplasty (Left: Shoulder)    Location: [facility] OR 18 / Virtual [facility] OR    Surgeons: Sandro Nix MD                                                         Pre- Anesthesia Evaluation                                            Mary See is a 74 y.o. female who presents for left TSR.    Past Medical History:   Diagnosis Date    Abnormal amount of sputum     Mucinex BID    Anemia     Anxiety     Asthma, moderate persistent (HHS-HCC)     controlled on Wixela    Carpal tunnel syndrome, bilateral upper limbs 2020    Severe carpal tunnel syndrome of both wrists    Depression     Fibromyalgia     Gastric ulcer     GERD (gastroesophageal reflux disease)     Hyperlipidemia     Hypertension     Hypothyroidism     IBS (irritable bowel syndrome)     Inadequate sleep hygiene 10/01/2018    Inadequate sleep hygiene    Morbid obesity (Multi)     OA (osteoarthritis)     PTSD (post-traumatic stress disorder)     PVC (premature ventricular contraction)     Remote PVCs, PACs    Scoliosis     Sleep apnea     CPAP setting 10-20, keeps HOB elevated 33 degrees    Spinal stenosis     Submandibular abscess 2021    Vocal cord dysfunction     w/ chronic upper airway cough and reccurent laryngospasm     Past Surgical History:   Procedure Laterality Date    ABSCESS DRAINAGE      submandibular    ANKLE SURGERY Right     CARPAL TUNNEL RELEASE      CATARACT EXTRACTION Bilateral 10/01/2018     SECTION, CLASSIC  1987     Section    COLONOSCOPY  2019    HERNIA REPAIR      Inguinal Hernia Repair    HIP DEBRIDEMENT      s/p replacement    LASIK  2018    ROTATOR CUFF REPAIR Left     TONSILLECTOMY      Tonsillectomy    TOTAL HIP ARTHROPLASTY Right     TOTAL KNEE ARTHROPLASTY      2003, 2016    TOTAL SHOULDER ARTHROPLASTY Right 2022    TRACHEOSTOMY CLOSURE      TRACHEOSTOMY TUBE  PLACEMENT  05/17/2021     Social History   She reports that she quit smoking about 46 years ago. Her smoking use included cigarettes. She started smoking about 53 years ago. She has a 7 pack-year smoking history. She has never been exposed to tobacco smoke. She has never used smokeless tobacco. She reports that she does not drink alcohol and does not use drugs.    Allergies and Medications   Allergies   Allergen Reactions    Ibuprofen Shortness of breath and Blurred vision     GI UPSET  tachycardia    Gluten Unknown     bloating,pain ,gas and diarrrhea    Adhesive Tape-Silicones Itching     incision glue, redness, blisters    Lactose GI Upset    Miconazole Hives    Soap Rash     Dial soap     Current Outpatient Medications   Medication Instructions    acetaminophen (TYLENOL EXTRA STRENGTH) 1,000 mg, oral, Every 4 hours PRN    albuterol 90 mcg/actuation inhaler USE 2 INHALATIONS BY MOUTH  EVERY 6 HOURS AS NEEDED FOR SHORTNESS OF BREATH    albuterol 2.5 mg, nebulization, Every 8 hours PRN    ALPRAZolam XR (XANAX XR) 0.5 mg, oral, Daily    ascorbic acid (VITAMIN C ORAL) 1 tablet, oral, Daily    calcium carbonate-vitamin D3 600 mg-20 mcg (800 unit) tablet 1 tablet, oral, 2 times daily    carboxymethylcellulose-glycern 0.5-0.9 % drops Use 1-2 Drops in both eyes as needed.    cetirizine (ZyrTEC) 10 mg tablet 1 tablet, oral, Daily    chlorhexidine (Peridex) 0.12 % solution Swish for 30 seconds and spit 15mL of solution the night before and morning of surgery    cholecalciferol (VITAMIN D-3) 125 mcg, oral, Daily    citalopram (CELEXA) 20 mg, oral, Daily    clobetasol (Temovate) 0.05 % cream APPLY TO AFFECTED AREA(S) TOPICALLY ONCE DAILY FOR 1 WEEK, THEN TWICE WEEKLY FOR MAINTENANCE.    diclofenac sodium (VOLTAREN ARTHRITIS PAIN) 4 g, Topical, 2 times daily PRN    docusate sodium (COLACE) 100 mg, oral, 2 times daily PRN    estradiol (Estrace) 0.1 MG/GM vaginal cream 1 Applicatorful, vaginal, 3 times weekly    fluticasone  (Flonase) 50 mcg/actuation nasal spray 2 sprays, Each Nostril, Daily    furosemide (LASIX) 20 mg, oral, Daily PRN    gabapentin (NEURONTIN) 300 mg, oral, 2 times daily    glucosamine-chondroit-vit C-Mn (Glucosamine Chondroitin MaxStr) 500-400 mg capsule 1,500 mg, oral, Daily    guaiFENesin (Mucinex) 1,200 mg tablet extended release 12hr 1 tablet, oral, Every 12 hours, FOR CONGESTION    ketoconazole (NIZOral) 2 % cream Topical, 2 times daily    krill/om3/dha/epa/om6/lip/astx (KRILL OIL, OMEGA 3 AND 6, ORAL) 1 capsule, oral, Daily    levothyroxine (SYNTHROID, LEVOXYL) 150 mcg, oral, Daily    losartan (COZAAR) 50 mg, oral, Daily    magnesium oxide-Mg AA chelate (Magnesium, oxide/AA chelate,) 300 mg capsule 1 capsule, oral, Daily    melatonin 3 mg tablet Take 10 mg by mouth once daily at bedtime.    montelukast (SINGULAIR) 10 mg, oral, Daily, as directed    multivitamin with minerals (multivit-min-iron fum-folic ac) tablet 1 tablet, oral, Daily    nystatin (Mycostatin) 100,000 unit/gram powder APPLY 2-3 TIMES DAILY TO AFFECTED AREA(S).  As needed.    omeprazole (PRILOSEC) 40 mg, oral, 2 times daily    semaglutide 0.25 mg or 0.5 mg (2 mg/3 mL) pen injector Inject 0.25 mg under the skin 1 (one) time per week for 28 days, THEN 0.5 mg 1 (one) time per week for 14 days.    semaglutide 0.5 mg, subcutaneous, Once Weekly    simethicone (MYLICON) 80 mg, oral, Every 6 hours PRN    triamterene-hydrochlorothiazid (Maxzide-25) 37.5-25 mg tablet 1 tablet, oral, Daily    Wixela Inhub 250-50 mcg/dose diskus inhaler 1 puff, inhalation, Every 12 hours    zinc sulfate (Zincate) 220 (50 Zn) MG capsule 1 capsule, oral, Daily       Recent Labs     07/18/24  1139 05/28/24  1055   WBC 6.3 5.8   HGB 11.9* 12.1   HCT 36.5 37.6    219   MCV 90 90     Recent Labs     07/18/24  1139 01/13/22  1413 04/02/18  1313   PROTIME  --   --  11.5   INR  --   --  1.0   ABO O   < >  --     < > = values in this interval not displayed.     Recent Labs      "07/18/24  1139 05/28/24  1055 05/30/23  1226   EGFR 67 67  --    ANIONGAP 13 14 13   BUN 19 17 23   CREATININE 0.90 0.90 1.00    139 137   K 3.3* 4.0 4.5   CL 98 102 99   CO2 29 27 30   GLUCOSE 79 89 101*     Recent Labs     05/28/24  1055 05/30/23  1226   PROT 6.6 6.8   ALBUMIN 4.2 4.7   BILITOT 0.5 0.6   ALKPHOS 96 74   ALT 15 18   AST 20 24       Lab Results   Component Value Date    STAPHMRSASCR No Staphylococcus aureus isolated 07/18/2024       EKG 7/19/24  Normal sinus rhythm  Left axis deviation  Possible Lateral infarct (cited on or before 25-APR-2022)  Abnormal ECG  When compared with ECG of 22-DEC-2022 15:00,  Questionable change in initial forces of Lateral leads    Echo      No results found for this or any previous visit from the past 1095 days.       No results found for this or any previous visit.    No results found for this or any previous visit.     Visit Vitals  /80   Pulse 87   Temp 36.8 °C (98.2 °F) (Temporal)   Resp 18   Ht 1.6 m (5' 3\")   Wt 101 kg (222 lb 10.6 oz)   SpO2 96%   BMI 39.44 kg/m²   OB Status Postmenopausal   Smoking Status Former   BSA 2.12 m²            Relevant Problems   Cardiac   (+) Benign essential hypertension   (+) Hypercholesterolemia      Pulmonary   (+) Asthma (HHS-HCC)   (+) Moderate persistent asthma without complication (HHS-HCC)   (+) Obstructive sleep apnea (adult) (pediatric)      Neuro   (+) Anxiety   (+) Depression   (+) PTSD (post-traumatic stress disorder)   (+) Sciatica      GI   (+) Dysphagia   (+) GERD (gastroesophageal reflux disease)   (+) Gastric ulcer   (+) Irritable bowel syndrome with diarrhea      Endocrine   (+) Hypothyroidism   (+) Morbid obesity (Multi)   (+) Type 2 diabetes mellitus without complications (Multi)      Musculoskeletal   (+) Disc degeneration, lumbar   (+) Fibromyalgia   (+) Generalized osteoarthrosis, involving multiple sites   (+) Lumbosacral spondylosis without myelopathy   (+) Other intervertebral disc degeneration, " lumbar region   (+) Other spondylosis with radiculopathy, cervical region   (+) Primary osteoarthritis of left shoulder   (+) Scoliosis   (+) Spinal stenosis   (+) Spinal stenosis of lumbar region   (+) Spondylosis of cervical region without myelopathy or radiculopathy   (+) Spondylosis without myelopathy or radiculopathy, lumbar region      HEENT   (+) Chronic sinusitis      ID   (+) Tinea cruris      Respiratory   (+) Tracheocutaneous fistula following tracheostomy (Multi)      ENT   (+) Status post tracheostomy (Multi)   (+) Vocal cord dysfunction       Clinical information reviewed:    Allergies  Meds   Med Hx  Surg Hx  OB Status  Fam Hx  Soc Hx        NPO Detail:  NPO/Void Status  Date of Last Liquid: 07/26/24  Time of Last Liquid: 0830  Date of Last Solid: 07/25/24  Time of Last Solid: 1900         Physical Exam    Airway  Mallampati: II  TM distance: >3 FB  Neck ROM: full     Cardiovascular   Rhythm: regular  Rate: normal     Dental - normal exam     Pulmonary   Comments: Normal RR  Non-labored respiration    Abdominal            Anesthesia Plan    History of general anesthesia?: yes  History of complications of general anesthesia?: no    ASA 3     general and regional   (General with 6.5 ETT,well lubricated d/t vocal cord dysfunction.. Standard ASA monitoring)  The patient is a current smoker.  Education provided regarding risk of obstructive sleep apnea.  intravenous induction   Postoperative administration of opioids is intended.  Anesthetic plan and risks discussed with patient.    Plan discussed with CAA and CRNA.

## 2024-07-26 NOTE — ANESTHESIA POSTPROCEDURE EVALUATION
Patient: Mary See    Procedure Summary       Date: 07/26/24 Room / Location: [facility] 18 / [facility]    Anesthesia Start: 1208 Anesthesia Stop: 1453    Procedure: Left Shoulder Reverse Total Arthroplasty (Left: Shoulder) Diagnosis:       Primary osteoarthritis of left shoulder      (Primary osteoarthritis of left shoulder [M19.012])    Surgeons: Sandro Nix MD Responsible Provider: Kate Mccain MD    Anesthesia Type: general, regional ASA Status: 3            Anesthesia Type: general, regional    Vitals Value Taken Time   /55 07/26/24 1453   Temp 36.1 °C (97 °F) 07/26/24 1453   Pulse 78 07/26/24 1459   Resp 17 07/26/24 1453   SpO2 95 % 07/26/24 1459   Vitals shown include unfiled device data.    Anesthesia Post Evaluation    Patient location during evaluation: PACU  Patient participation: complete - patient participated  Level of consciousness: awake  Pain management: satisfactory to patient  Multimodal analgesia pain management approach  Airway patency: patent  Cardiovascular status: hemodynamically stable  Respiratory status: spontaneous ventilation  Hydration status: acceptable  Postoperative Nausea and Vomiting: none        No notable events documented.

## 2024-07-26 NOTE — ANESTHESIA PROCEDURE NOTES
Airway  Date/Time: 7/26/2024 12:17 PM  Urgency: elective    Airway not difficult    Staffing  Performed: VIC   Authorized by: Kate Mccain MD    Performed by: VIC Nolasco  Patient location during procedure: OR    Indications and Patient Condition  Indications for airway management: anesthesia and airway protection  Spontaneous Ventilation: absent  Sedation level: deep  Preoxygenated: yes  Patient position: sniffing  Mask difficulty assessment: 1 - vent by mask  Planned trial extubation    Final Airway Details  Final airway type: endotracheal airway      Successful airway: ETT  Cuffed: yes   Successful intubation technique: video laryngoscopy  Facilitating devices/methods: intubating stylet  Endotracheal tube insertion site: oral  Blade: Blaise  Blade size: #3  ETT size (mm): 6.5  Cormack-Lehane Classification: grade I - full view of glottis  Placement verified by: chest auscultation   Measured from: teeth  ETT to teeth (cm): 21  Number of attempts at approach: 1    Additional Comments  Lips/teeth in preanesthetic condition.

## 2024-07-26 NOTE — OP NOTE
Left Shoulder Reverse Total Arthroplasty (L) Operative Note     Date: 2024  OR Location: [facility] A OR    Name: Mary See, : 1950, Age: 74 y.o., MRN: [id number], Sex: female    Diagnosis  Pre-op Diagnosis      * Primary osteoarthritis of left shoulder [M19.012] Post-op Diagnosis     * Primary osteoarthritis of left shoulder [M19.012]     Procedures  Left Shoulder Reverse Total Arthroplasty  26657 - TX ARTHROPLASTY GLENOHUMERAL JOINT TOTAL SHOULDER      Surgeons      * Sandro Nix - Primary    Resident/Fellow/Other Assistant:  Surgeons and Role:     * Diony Edwards DO - Resident - Assisting     * Divina Chavarria, APRN-CNP - [facility]AN[facility] First Assist    Procedure Summary  Anesthesia: Regional, General  ASA: III  Anesthesia Staff: Anesthesiologist: Kate Mccain MD  C-AA: VIC Nolasco  Estimated Blood Loss: 70mL  Intra-op Medications: Administrations occurring from 1230 to 1500 on 24:  * No intraprocedure medications in log *           Anesthesia Record               Intraprocedure I/O Totals          Intake    Tranexamic Acid 0.00 mL    The total shown is the total volume documented since Anesthesia Start was filed.    Total Intake 0 mL       Output    Est. Blood Loss 70 mL    Total Output 70 mL       Net    Net Volume -70 mL          Specimen: No specimens collected     Staff:   Circulator: Osiris Ashtonub Person: Ju Reyes Person: Hillary Larsen Circulator: Michela         Drains and/or Catheters: * None in log *    Tourniquet Times:         Implants:  Implants       Type Name Action Serial No.      Joint Shoulder REVERSE SHOULDER GLENOID STD BASEPLATE Implanted NA     Joint GLENOSPHERE, RSA OFFSET, 33MM X 0MM +6MM - SNA - [id number] Implanted NA     Joint Shoulder INSET SHOULDER SYSTEM HUMERAL STEM Implanted NA     Joint Shoulder REVERSE SHOULD NEUTRAL HUMERAL BEARING Implanted NA     Joint TRAY, RSA HUMERAL, 38MM - SNA - [id number] Implanted NA              Findings: Consistent  with diagnosis patient had end-stage arthritis and superior and anterior rotator cuff tears    Indications: Mary See is an 74 y.o. female who is having surgery for Primary osteoarthritis of left shoulder [M19.012].  Rotator cuff tear m75.120, longitudinal tear of the [id number]    The patient was seen in the preoperative area. The risks, benefits, complications, treatment options, non-operative alternatives, expected recovery and outcomes were discussed with the patient. The possibilities of reaction to medication, pulmonary aspiration, injury to surrounding structures, bleeding, recurrent infection, the need for additional procedures, failure to diagnose a condition, and creating a complication requiring transfusion or operation were discussed with the patient. The patient concurred with the proposed plan, giving informed consent.  The site of surgery was properly noted/marked if necessary per policy. The patient has been actively warmed in preoperative area. Preoperative antibiotics have been ordered and given within 1 hours of incision. Venous thrombosis prophylaxis have been ordered including bilateral sequential compression devices    Procedure Details: ANESTHESIA:  General with Interscalene    Implants:   BRIEF HISTORY:    This is a very pleasant person who suffered from a rotator cuff tear.  It was irreparable.   She also had inflammatory end-stage arthritis.  She had done well with her contralateral side.  Risks, benefits, and alternatives of surgery were discussed including risk of infection, bleeding, nerve injury, and no improvement in pain.  they understood and wished to proceed.    OPERATIVE REPORT:    On the day of surgery, they were seen in the preop holding area, identified as the correct patient.  The shoulder was identified and marked as the correct operative site.  Risks, benefits, and alternatives of surgery were discussed at length.  they understood the risk of dislocation, infection,  bleeding, and nerve injury and wished to proceed.  Consent was signed, shoulder was marked.  Interscalene nerve block was performed.  they were taken to the operating room where a huddle ensued ensuring the correct patient and correct shoulder as the correct operative site, all in agreement with the correct shoulder.  they were prepped and draped in standard fashion and positioned in 60-degree beach-chair position after they were intubated and sedated.  Following this, we used Ioban and space suits to increase sterility.  We made a standard deltopectoral incision.  We opened up the deltopectoral interval protecting the cephalic vein laterally throughout the course of the case.  We released the subdeltoid space.  We identified and released the proximal aspect of the pectoralis major insertion.  We released the lateral border of the conjoined tendon.  Identified the circumflex vessels and ligated them in standard fashion.  We then released the biceps tendon all the way to the supraglenoid tubercle.  It was tenodesed to the superior border of the pectoralis major insertion.  We then placed a stitch through the subscapularis in a peel technique, dislocated the shoulder after doing a capsular release.  Following this, we removed osteophytes in standard fashion and made a standard cut in 30 degrees of retroversion.  Placed a cup protector, exposed the glenoid, circumferential release of the labrum and capsule and soft tissue.  Placed our standard base plate with glenosphere.   locking screws were used for the baseplate as well.  Following this, we turned our attention back to the humerus.  We used our metaphyseal reamer.  We had excellent fixation.  We trialed and found excellent fixation with the insert of choice.  We copiously irrigated the wound, placed 2 drill holes along the lesser tuberosity for a later repair of the subscapularis and soft tissue.  We impacted the implant into place, with the selected polyethylene  insert impacted in standard fashion, shoulder was reduced, excellent 50, 50 shuck laterally.  Copiously irrigated the wound.  Closed down the anterior soft tissue.  0 Vicryl, 2-0 Vicryl, running Monocryl, and Dermabond completed the closure.  Antibiotics were dosed appropriately.  Tranexamic was given as indicated.  Please note that we will be billing for my nurse practitioner's first assist as she was critical in the center for successful completion of the case including positioning of the body, retraction, suture management, placement of the implants and wound closure. There was no qualified resident available for the entire case  Size 6 I-95 stem standard tray liner 33+6 10 degree augmented glenoid    Complications:  None; patient tolerated the procedure well.    Disposition: PACU - hemodynamically stable.  Condition: stable         Additional Details: None    Attending Attestation:     Sandro Nix  Phone Number: [phone]

## 2024-07-26 NOTE — PERIOPERATIVE NURSING NOTE
1446- PHASE I - Patient to PACU bay at this time in stable condition. Bedside monitors applied to patient upon arrival to PACU. Report received from OR Team at bedside.     1510- Patient tolerating sips of ginger ale at this time. Denies nausea.    1523- Report to MARY Cook

## 2024-07-26 NOTE — ANESTHESIA PROCEDURE NOTES
Peripheral Block    Patient location during procedure: pre-op  Start time: 7/26/2024 11:35 AM  End time: 7/26/2024 11:42 AM  Reason for block: at surgeon's request and post-op pain management  Staffing  Performed: attending   Authorized by: Kate Mccain MD    Performed by: Kate Mccain MD  Preanesthetic Checklist  Completed: patient identified, IV checked, site marked, risks and benefits discussed, surgical consent, monitors and equipment checked, pre-op evaluation and timeout performed   Timeout performed at: 7/26/2024 11:34 AM  Peripheral Block  Patient position: sitting  Prep: ChloraPrep and site prepped and draped  Patient monitoring: heart rate, cardiac monitor and continuous pulse ox  Block type: interscalene and brachial plexus  Laterality: left  Injection technique: single-shot  Guidance: nerve stimulator, ultrasound guided and ultrasound image saved to chart.  Local infiltration: lidocaine  Infiltration strength: 1 %  Dose: 2 mL  Needle  Needle type: short-bevel   Needle gauge: 22 G  Needle length: 5 cm  Needle localization: anatomical landmarks, nerve stimulator and ultrasound guidance  Test dose: negative  Assessment  Injection assessment: negative aspiration for heme, no paresthesia on injection, incremental injection and local visualized surrounding nerve on ultrasound  Paresthesia pain: none  Heart rate change: no  Slow fractionated injection: no  Additional Notes  Pre-medication with Versed 5 mg  intravenously.  Following a focussed neurological history, procedure-related and patient-specific complications were discussed. Verbal consent was provided by the patient and/or surrogate decision maker for Interscalene brachial plexus block. Anticoagulation (if any) was held per [facility] guidelines.  No Evoked Motor Response was visible at < 0.3 mA. Using in-plane needle visualization, 30 ml of 0.5% Bupivacaine with epi 5 mcg/ml and decadron 4 mg was injected extraneurally in 5 ml increments. There was no  resistance to injection and appropriate injection pressures were maintained based on tactile feedback. Throughout the procedure, there was consistent and meaningful verbal communication with the patient. Post procedure vital signs were stable and no immediate complications were noted. PACU will provide written instructions that outline the specific precautions to be taken when caring for an akinetic,insensate extremity.

## 2024-07-27 PROCEDURE — 7100000011 HC EXTENDED STAY RECOVERY HOURLY - NURSING UNIT

## 2024-07-28 PROCEDURE — 7100000011 HC EXTENDED STAY RECOVERY HOURLY - NURSING UNIT

## 2024-07-29 LAB
ATRIAL RATE: 62 BPM
P AXIS: 29 DEGREES
P OFFSET: 176 MS
P ONSET: 111 MS
PR INTERVAL: 204 MS
Q ONSET: 213 MS
QRS COUNT: 11 BEATS
QRS DURATION: 104 MS
QT INTERVAL: 444 MS
QTC CALCULATION(BAZETT): 450 MS
QTC FREDERICIA: 449 MS
R AXIS: -35 DEGREES
T AXIS: 22 DEGREES
T OFFSET: 435 MS
VENTRICULAR RATE: 62 BPM

## 2024-07-29 PROCEDURE — 7100000011 HC EXTENDED STAY RECOVERY HOURLY - NURSING UNIT

## 2024-07-30 PROCEDURE — 7100000011 HC EXTENDED STAY RECOVERY HOURLY - NURSING UNIT

## 2024-07-31 PROCEDURE — 7100000011 HC EXTENDED STAY RECOVERY HOURLY - NURSING UNIT

## 2024-08-01 ENCOUNTER — APPOINTMENT (OUTPATIENT)
Dept: RADIOLOGY | Facility: CLINIC | Age: 74
End: 2024-08-01
Payer: MEDICARE

## 2024-08-02 ENCOUNTER — APPOINTMENT (OUTPATIENT)
Dept: PHARMACY | Facility: HOSPITAL | Age: 74
End: 2024-08-02
Payer: MEDICARE

## 2024-08-06 ENCOUNTER — APPOINTMENT (OUTPATIENT)
Dept: PHARMACY | Facility: HOSPITAL | Age: 74
End: 2024-08-06
Payer: MEDICARE

## 2024-08-06 DIAGNOSIS — E66.01 MORBID (SEVERE) OBESITY DUE TO EXCESS CALORIES (MULTI): ICD-10-CM

## 2024-08-06 NOTE — PROGRESS NOTES
Pharmacist Clinic: Weight Management    Mary See was referred to the Clinical Pharmacy Team for weight management.     Referring Provider: Skip Sal MD  Problem List Items Addressed This Visit    None  Visit Diagnoses       Morbid (severe) obesity due to excess calories (Multi)        Relevant Orders    Follow Up In [facility] - Pharmacy            HISTORY OF PRESENT ILLNESS    Diet: coffee and water, meat, vegetables, fruit; avoids bread and pasta, intolerant of gluten    Exercise Routine: experiences pain in feet, issues walking.  active around the house.     Weight  220 lb (initial)  Has lost 24 pounds in first 4 weeks  <5 lb weight loss since last pharmacy follow up    Reporting much more severe side effects, burping/bloating, gas, constipation alternating with diarrhea   Patient has upcoming surgical procedure Friday 7/26/24, has been advised to hold Ozempic 10-14 days prior (last dose 7/10) and will continue to hold until after procedure    PHARMACY ASSESSMENT  Pertinent PMH Review:  - PMH of Pancreatitis: No  - PMH of Retinopathy: No  - PMH of MTC: No    Allergies: Ibuprofen, Gluten, Adhesive tape-silicones, Lactose, Miconazole, and Soap     [id number] [place], [place] - [address] [facility]  [id number] [place] [address]  Phone: [phone] Fax: [phone]    [facility] [address]  [address]  [facility] [address]  Phone: [phone] Fax: [phone]     [facility]  [address] [facility], [place] [address]  Phone: [phone] Fax: [phone]    Affordability/Accessibility: Ozempic covered by insurance plan, off label use    DRUG INTERACTIONS  - No significant drug-drug interactions exist that require adjustment to therapy    LAB  Lab Results   Component Value Date    BILITOT 0.5 05/28/2024    CALCIUM 9.0 07/18/2024    CO2 29 07/18/2024    CL 98 07/18/2024     CREATININE 0.90 07/18/2024    GLUCOSE 79 07/18/2024    ALKPHOS 96 05/28/2024    K 3.3 (L) 07/18/2024    PROT 6.6 05/28/2024     07/18/2024    AST 20 05/28/2024    ALT 15 05/28/2024    BUN 19 07/18/2024    ANIONGAP 13 07/18/2024    PHOS 2.3 (L) 05/11/2021     05/08/2021    ALBUMIN 4.2 05/28/2024    EGFR 67 07/18/2024     Lab Results   Component Value Date    TRIG 205 (H) 05/28/2024    CHOL 278 (H) 05/28/2024    LDLCALC 169 (H) 05/28/2024    HDL 67.6 05/28/2024     Lab Results   Component Value Date    HGBA1C 4.9 07/18/2024       Current Outpatient Medications on File Prior to Visit   Medication Sig Dispense Refill    acetaminophen (Tylenol Extra Strength) 500 mg tablet Take 2 tablets (1,000 mg) by mouth every 4 hours if needed.      albuterol 2.5 mg /3 mL (0.083 %) nebulizer solution Take 3 mL (2.5 mg) by nebulization every 8 hours if needed.      albuterol 90 mcg/actuation inhaler USE 2 INHALATIONS BY MOUTH  EVERY 6 HOURS AS NEEDED FOR SHORTNESS OF BREATH 34 g 3    ALPRAZolam XR (Xanax XR) 0.5 mg 24 hr tablet Take 1 tablet (0.5 mg) by mouth once daily. 90 tablet 0    ascorbic acid (VITAMIN C ORAL) Take 1 tablet by mouth once daily.      aspirin 81 mg EC tablet Take 1 tablet (81 mg) by mouth once daily for 14 days. 14 tablet 0    calcium carbonate-vitamin D3 600 mg-20 mcg (800 unit) tablet Take 1 tablet by mouth 2 times a day.      carboxymethylcellulose-glycern 0.5-0.9 % drops Use 1-2 Drops in both eyes as needed.      cetirizine (ZyrTEC) 10 mg tablet Take 1 tablet (10 mg) by mouth once daily.      chlorhexidine (Peridex) 0.12 % solution Swish for 30 seconds and spit 15mL of solution the night before and morning of surgery 475 mL 0    cholecalciferol (Vitamin D-3) 125 MCG (5000 UT) capsule Take 1 capsule (125 mcg) by mouth once daily.      citalopram (CeleXA) 40 mg tablet Take 0.5 tablets (20 mg) by mouth once daily. (Patient taking differently: Take 1 tablet (40 mg) by mouth once daily.) 15 tablet 1     clobetasol (Temovate) 0.05 % cream APPLY TO AFFECTED AREA(S) TOPICALLY ONCE DAILY FOR 1 WEEK, THEN TWICE WEEKLY FOR MAINTENANCE.      diclofenac sodium (Voltaren Arthritis Pain) 1 % gel Apply 4.5 inches (4 g) topically 2 times a day as needed.      docusate sodium (Colace) 100 mg capsule Take 1 capsule (100 mg) by mouth 2 times a day as needed for constipation.      estradiol (Estrace) 0.1 MG/GM vaginal cream Insert 1 Applicatorful into the vagina 3 times a week.      fluticasone (Flonase) 50 mcg/actuation nasal spray Administer 2 sprays into each nostril once daily. 48 g 3    furosemide (Lasix) 20 mg tablet Take 1 tablet (20 mg) by mouth once daily as needed (edema). 90 tablet 3    gabapentin (Neurontin) 300 mg capsule Take 1 capsule (300 mg) by mouth 2 times a day. 810 capsule 3    glucosamine-chondroit-vit C-Mn (Glucosamine Chondroitin MaxStr) 500-400 mg capsule Take 1,500 mg by mouth once daily.      guaiFENesin (Mucinex) 1,200 mg tablet extended release 12hr Take 1 tablet (1,200 mg) by mouth every 12 hours. FOR CONGESTION      ketoconazole (NIZOral) 2 % cream Apply topically twice a day.      krill/om3/dha/epa/om6/lip/astx (KRILL OIL, OMEGA 3 AND 6, ORAL) Take 1 capsule by mouth once daily.      levothyroxine (Synthroid, Levoxyl) 150 mcg tablet TAKE 1 TABLET BY MOUTH ONCE  DAILY 90 tablet 3    losartan (Cozaar) 50 mg tablet TAKE 1 TABLET BY MOUTH ONCE  DAILY 90 tablet 3    magnesium oxide-Mg AA chelate (Magnesium, oxide/AA chelate,) 300 mg capsule Take 1 capsule (300 mg) by mouth once daily.      melatonin 3 mg tablet Take 10 mg by mouth once daily at bedtime.      montelukast (Singulair) 10 mg tablet TAKE 1 TABLET BY MOUTH DAILY AS  DIRECTED 90 tablet 3    multivitamin with minerals (multivit-min-iron fum-folic ac) tablet Take 1 tablet by mouth once daily.      nystatin (Mycostatin) 100,000 unit/gram powder APPLY 2-3 TIMES DAILY TO AFFECTED AREA(S).  As needed.      omeprazole (PriLOSEC) 40 mg DR capsule TAKE  1 CAPSULE BY MOUTH TWICE  DAILY 180 capsule 3    [] oxyCODONE-acetaminophen (Percocet) 5-325 mg tablet Take 1 tablet by mouth every 6 hours if needed for severe pain (7 - 10) for up to 6 days. 24 tablet 0    semaglutide 0.25 mg or 0.5 mg (2 mg/3 mL) pen injector Inject 0.25 mg under the skin 1 (one) time per week for 28 days, THEN 0.5 mg 1 (one) time per week for 14 days. 2 mL 0    simethicone (Mylicon) 80 mg chewable tablet Chew 1 tablet (80 mg) every 6 hours if needed.      triamterene-hydrochlorothiazid (Maxzide-25) 37.5-25 mg tablet TAKE 1 TABLET BY MOUTH DAILY 90 tablet 3    Wixela Inhub 250-50 mcg/dose diskus inhaler USE 1 INHALATION BY MOUTH EVERY  12 HOURS 180 each 3    zinc sulfate (Zincate) 220 (50 Zn) MG capsule Take 1 capsule (50 mg of elemental zinc) by mouth once daily.      [DISCONTINUED] semaglutide 0.25 mg or 0.5 mg (2 mg/3 mL) pen injector Inject 0.5 mg under the skin 1 (one) time per week. 3 mL 3     No current facility-administered medications on file prior to visit.       PATIENT EDUCATION/GOALS  - Counseled patient on MOA, expectations, side effects, duration of therapy, contraindications, administration, and monitoring parameters  - Answered all patient questions and concerns; provided [facility] phone number if issues/questions arise    RECOMMENDATIONS/PLAN  Weight Loss Plan: Patient continuing to hold Ozempic.  Bowels have not yet regulated since surgery, having bouts of constipation followed by loose stools.  Patient states she thinks she may be normalizing.   She has follow up with surgical team next week and will continue to hold Ozempic until follow up with PCP 24.  Will get prescription from Dr. Sal at follow up    Clinical Pharmacist follow up: 24   Patient encouraged to follow up sooner as needed     Continue all meds under the continuation of care with the referring provider and clinical pharmacy team.    Stefanie Thacker, PharmD  Clinical Pharmacy Specialist, Primary Care    [phone]    Verbal consent to manage patient's drug therapy was obtained from patient. They were informed they may decline to participate or withdraw from participation in pharmacy services at any time.

## 2024-08-12 ENCOUNTER — OFFICE VISIT (OUTPATIENT)
Dept: ORTHOPEDIC SURGERY | Facility: CLINIC | Age: 74
End: 2024-08-12
Payer: MEDICARE

## 2024-08-12 ENCOUNTER — HOSPITAL ENCOUNTER (OUTPATIENT)
Dept: RADIOLOGY | Facility: CLINIC | Age: 74
Discharge: HOME | End: 2024-08-12
Payer: MEDICARE

## 2024-08-12 DIAGNOSIS — Z96.612 STATUS POST REVERSE TOTAL SHOULDER REPLACEMENT, LEFT: ICD-10-CM

## 2024-08-12 DIAGNOSIS — Z79.2 PROPHYLACTIC ANTIBIOTIC: ICD-10-CM

## 2024-08-12 DIAGNOSIS — Z96.612 STATUS POST REVERSE TOTAL SHOULDER REPLACEMENT, LEFT: Primary | ICD-10-CM

## 2024-08-12 PROCEDURE — 1159F MED LIST DOCD IN RCRD: CPT | Performed by: NURSE PRACTITIONER

## 2024-08-12 PROCEDURE — 3044F HG A1C LEVEL LT 7.0%: CPT | Performed by: NURSE PRACTITIONER

## 2024-08-12 PROCEDURE — 3050F LDL-C >= 130 MG/DL: CPT | Performed by: NURSE PRACTITIONER

## 2024-08-12 PROCEDURE — 73030 X-RAY EXAM OF SHOULDER: CPT | Mod: LEFT SIDE | Performed by: RADIOLOGY

## 2024-08-12 PROCEDURE — 1160F RVW MEDS BY RX/DR IN RCRD: CPT | Performed by: NURSE PRACTITIONER

## 2024-08-12 PROCEDURE — 4010F ACE/ARB THERAPY RXD/TAKEN: CPT | Performed by: NURSE PRACTITIONER

## 2024-08-12 PROCEDURE — 99211 OFF/OP EST MAY X REQ PHY/QHP: CPT | Performed by: NURSE PRACTITIONER

## 2024-08-12 PROCEDURE — 73030 X-RAY EXAM OF SHOULDER: CPT | Mod: LT

## 2024-08-12 RX ORDER — AMOXICILLIN 500 MG/1
2000 TABLET, FILM COATED ORAL ONCE
Qty: 4 TABLET | Refills: 0 | Status: SHIPPED | OUTPATIENT
Start: 2024-08-12 | End: 2024-08-12

## 2024-08-12 RX ORDER — AMOXICILLIN 500 MG/1
2000 TABLET, FILM COATED ORAL ONCE
Qty: 4 TABLET | Refills: 1 | Status: SHIPPED | OUTPATIENT
Start: 2024-08-12 | End: 2024-08-12

## 2024-08-12 NOTE — PROGRESS NOTES
Provider Impression/Assessment:  Mary See is a pleasant 74 y.o. female who is returning to clinic for their first postoperative visit. They are 2 weeks status post left reverse total shoulder replacement done on 7/26/24.    Patient Discussion/Plan:  Mary See is very doing well since surgery. They may discard the sling at this time. The sling is used for comfort after the first 2 weeks following surgery. For the next 6 weeks, no lifting more than a coffee cup up to 5 pounds at waist level. Keep your hand in front of you at all times. No opening or closing doors. We emphasized scapular stabilizers and they are instructed on how to do them. She is going to initiate active range of motion and passive range of motion using their other arm daily for the next 6 weeks. No lifting above the shoulder level. Handout with exercises was given today. You can slowly progress into the overhead shoulder activities after a month. It can take between 2-3 months to retrain the deltoid muscle to use the reverse shoulder replacement properly.    If there are any concerns about range of motion we will initiate physical therapy. We believe that will be unlikely at that time as she did great with rehabbing her other shoulder on her own at home. We talked about what they can and can't do. They understands the importance of needing to keep their hand in front for approximately the next 3 months.     Patient should not visit the Dentist for 3 months after shoulder replacement, unless an emergency. Patient understands the need for lifelong prophylactic oral antibiotic prior to dental work in the future. They understand that their Dentist or our team can prescribe the antibiotics for them in the future. We have sent her a prescription today to utilize for her dental cleaning appointment in 3 months.     We have scheduled to have Mary See see Dr Nix in 6 weeks for a repeat clinical check at our [facility]. She will  not need repeat imaging at that time.     All questions were answered today to their satisfaction and they know how to reach our office if needed prior to the  next scheduled visit with our office.     Should you have any questions or concerns after your visit today, please do not hesitate to call the office at [phone]. 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 She is 2 weeks s/p left reverse replacement. She reports doing well after surgery. She has had no pain since the nerve block wore off. She denies use of narcotic pain medication at this time or Over-the-counter pain medication for the shoulder. Using ice as needed.  Sleeping in bed without difficulty. Denies redness or warmth at incision site. Denies any fever, chills, or paresthesia. They have been compliant with restrictions. Presents today wearing their shoulder sling. Doing well with daily home therapy for active elbow, wrist and hand exercises. She has not attempted any shoulder exercises at this point. No issues with her incision since the surgical glue was not used this time, due to her reaction with past surgery. She denies any drainage or issues with her incision today. Suture remain in place.     Review of Systems:   Review of systems for all 14 systems is negative for complaint except for the above noted findings in the history of present illness.    Family, social, and medical histories are obtained and reviewed.    Allergies:  Allergies   Allergen Reactions    Ibuprofen Shortness of breath and Blurred vision     GI UPSET  tachycardia    Gluten Unknown      bloating,pain ,gas and diarrrhea    Adhesive Tape-Silicones Itching     incision glue, redness, blisters    Lactose GI Upset    Miconazole Hives    Soap Rash     Dial soap       Medications:    Current Outpatient Medications:     acetaminophen (Tylenol Extra Strength) 500 mg tablet, Take 2 tablets (1,000 mg) by mouth every 4 hours if needed., Disp: , Rfl:     albuterol 2.5 mg /3 mL (0.083 %) nebulizer solution, Take 3 mL (2.5 mg) by nebulization every 8 hours if needed., Disp: , Rfl:     albuterol 90 mcg/actuation inhaler, USE 2 INHALATIONS BY MOUTH  EVERY 6 HOURS AS NEEDED FOR SHORTNESS OF BREATH, Disp: 34 g, Rfl: 3    ALPRAZolam XR (Xanax XR) 0.5 mg 24 hr tablet, Take 1 tablet (0.5 mg) by mouth once daily., Disp: 90 tablet, Rfl: 0    amoxicillin (Amoxil) 500 mg tablet, Take 4 tablets (2,000 mg) by mouth 1 time for 1 dose. Take (4) 500mg tablets one hour prior to dental procedure., Disp: 4 tablet, Rfl: 1    ascorbic acid (VITAMIN C ORAL), Take 1 tablet by mouth once daily., Disp: , Rfl:     calcium carbonate-vitamin D3 600 mg-20 mcg (800 unit) tablet, Take 1 tablet by mouth 2 times a day., Disp: , Rfl:     carboxymethylcellulose-glycern 0.5-0.9 % drops, Use 1-2 Drops in both eyes as needed., Disp: , Rfl:     cetirizine (ZyrTEC) 10 mg tablet, Take 1 tablet (10 mg) by mouth once daily., Disp: , Rfl:     chlorhexidine (Peridex) 0.12 % solution, Swish for 30 seconds and spit 15mL of solution the night before and morning of surgery, Disp: 475 mL, Rfl: 0    cholecalciferol (Vitamin D-3) 125 MCG (5000 UT) capsule, Take 1 capsule (125 mcg) by mouth once daily., Disp: , Rfl:     citalopram (CeleXA) 40 mg tablet, Take 0.5 tablets (20 mg) by mouth once daily. (Patient taking differently: Take 1 tablet (40 mg) by mouth once daily.), Disp: 15 tablet, Rfl: 1    clobetasol (Temovate) 0.05 % cream, APPLY TO AFFECTED AREA(S) TOPICALLY ONCE DAILY FOR 1 WEEK, THEN TWICE WEEKLY FOR MAINTENANCE., Disp: , Rfl:     diclofenac sodium  (Voltaren Arthritis Pain) 1 % gel, Apply 4.5 inches (4 g) topically 2 times a day as needed., Disp: , Rfl:     docusate sodium (Colace) 100 mg capsule, Take 1 capsule (100 mg) by mouth 2 times a day as needed for constipation., Disp: , Rfl:     estradiol (Estrace) 0.1 MG/GM vaginal cream, Insert 1 Applicatorful into the vagina 3 times a week., Disp: , Rfl:     fluticasone (Flonase) 50 mcg/actuation nasal spray, Administer 2 sprays into each nostril once daily., Disp: 48 g, Rfl: 3    furosemide (Lasix) 20 mg tablet, Take 1 tablet (20 mg) by mouth once daily as needed (edema)., Disp: 90 tablet, Rfl: 3    gabapentin (Neurontin) 300 mg capsule, Take 1 capsule (300 mg) by mouth 2 times a day., Disp: 810 capsule, Rfl: 3    glucosamine-chondroit-vit C-Mn (Glucosamine Chondroitin MaxStr) 500-400 mg capsule, Take 1,500 mg by mouth once daily., Disp: , Rfl:     guaiFENesin (Mucinex) 1,200 mg tablet extended release 12hr, Take 1 tablet (1,200 mg) by mouth every 12 hours. FOR CONGESTION, Disp: , Rfl:     ketoconazole (NIZOral) 2 % cream, Apply topically twice a day., Disp: , Rfl:     krill/om3/dha/epa/om6/lip/astx (KRILL OIL, OMEGA 3 AND 6, ORAL), Take 1 capsule by mouth once daily., Disp: , Rfl:     levothyroxine (Synthroid, Levoxyl) 150 mcg tablet, TAKE 1 TABLET BY MOUTH ONCE  DAILY, Disp: 90 tablet, Rfl: 3    losartan (Cozaar) 50 mg tablet, TAKE 1 TABLET BY MOUTH ONCE  DAILY, Disp: 90 tablet, Rfl: 3    magnesium oxide-Mg AA chelate (Magnesium, oxide/AA chelate,) 300 mg capsule, Take 1 capsule (300 mg) by mouth once daily., Disp: , Rfl:     melatonin 3 mg tablet, Take 10 mg by mouth once daily at bedtime., Disp: , Rfl:     montelukast (Singulair) 10 mg tablet, TAKE 1 TABLET BY MOUTH DAILY AS  DIRECTED, Disp: 90 tablet, Rfl: 3    multivitamin with minerals (multivit-min-iron fum-folic ac) tablet, Take 1 tablet by mouth once daily., Disp: , Rfl:     nystatin (Mycostatin) 100,000 unit/gram powder, APPLY 2-3 TIMES DAILY TO  AFFECTED AREA(S).  As needed., Disp: , Rfl:     omeprazole (PriLOSEC) 40 mg DR capsule, TAKE 1 CAPSULE BY MOUTH TWICE  DAILY, Disp: 180 capsule, Rfl: 3    semaglutide 0.25 mg or 0.5 mg (2 mg/3 mL) pen injector, Inject 0.25 mg under the skin 1 (one) time per week for 28 days, THEN 0.5 mg 1 (one) time per week for 14 days., Disp: 2 mL, Rfl: 0    simethicone (Mylicon) 80 mg chewable tablet, Chew 1 tablet (80 mg) every 6 hours if needed., Disp: , Rfl:     triamterene-hydrochlorothiazid (Maxzide-25) 37.5-25 mg tablet, TAKE 1 TABLET BY MOUTH DAILY, Disp: 90 tablet, Rfl: 3    Wixela Inhub 250-50 mcg/dose diskus inhaler, USE 1 INHALATION BY MOUTH EVERY  12 HOURS, Disp: 180 each, Rfl: 3    zinc sulfate (Zincate) 220 (50 Zn) MG capsule, Take 1 capsule (50 mg of elemental zinc) by mouth once daily., Disp: , Rfl:     Diagnoses/Problems:  Left shoulder arthritis    Physical Examination:  Patient left shoulder incision is dry, intact and healing well. No swelling. No ecchymosis. No erythema or warmth. Nonabsorbable suture was removed today. Neurovascularly intact distally. 5 out of 5 wrist, hand and thumb flexion and extension without pain. Full range of motion of elbow. Have not attempted range of motion yet with the shoulder, passively or actively.     Results/Imaging:  Independent review of the imaging today of left shoulder show excellent alignment status post reverse total shoulder replacement. No evidence of fracture, dislocation of other acute findings. I personally spent time viewing digital images of the radiology testing with the patient. No axillary view today.        *While intending to generate a timely document that accurately reflects the content of the visit, no guarantee can be provided that every grammatical or spelling mistake has been or will be identified or corrected. Thank you for your understanding.

## 2024-08-24 DIAGNOSIS — N90.4 LICHEN SCLEROSUS OF FEMALE GENITALIA: Primary | ICD-10-CM

## 2024-08-26 RX ORDER — CLOBETASOL PROPIONATE 0.5 MG/G
OINTMENT TOPICAL
Qty: 60 G | Refills: 0 | Status: SHIPPED | OUTPATIENT
Start: 2024-08-26

## 2024-08-28 ENCOUNTER — APPOINTMENT (OUTPATIENT)
Dept: PRIMARY CARE | Facility: CLINIC | Age: 74
End: 2024-08-28
Payer: MEDICARE

## 2024-08-28 VITALS
BODY MASS INDEX: 40.88 KG/M2 | DIASTOLIC BLOOD PRESSURE: 76 MMHG | WEIGHT: 230.8 LBS | HEART RATE: 68 BPM | SYSTOLIC BLOOD PRESSURE: 148 MMHG | TEMPERATURE: 97.3 F

## 2024-08-28 DIAGNOSIS — R45.89 DEPRESSED MOOD: ICD-10-CM

## 2024-08-28 DIAGNOSIS — M14.671 CHARCOT'S JOINT OF RIGHT FOOT: ICD-10-CM

## 2024-08-28 DIAGNOSIS — M21.969 ACQUIRED DEFORMITY OF ANKLE AND FOOT, UNSPECIFIED LATERALITY: ICD-10-CM

## 2024-08-28 DIAGNOSIS — E66.01 MORBID OBESITY (MULTI): Primary | ICD-10-CM

## 2024-08-28 DIAGNOSIS — M14.672 CHARCOT'S JOINT OF LEFT FOOT: ICD-10-CM

## 2024-08-28 DIAGNOSIS — M51.36 DISC DEGENERATION, LUMBAR: ICD-10-CM

## 2024-08-28 PROCEDURE — 4010F ACE/ARB THERAPY RXD/TAKEN: CPT | Performed by: INTERNAL MEDICINE

## 2024-08-28 PROCEDURE — 3077F SYST BP >= 140 MM HG: CPT | Performed by: INTERNAL MEDICINE

## 2024-08-28 PROCEDURE — 1126F AMNT PAIN NOTED NONE PRSNT: CPT | Performed by: INTERNAL MEDICINE

## 2024-08-28 PROCEDURE — 3044F HG A1C LEVEL LT 7.0%: CPT | Performed by: INTERNAL MEDICINE

## 2024-08-28 PROCEDURE — G2211 COMPLEX E/M VISIT ADD ON: HCPCS | Performed by: INTERNAL MEDICINE

## 2024-08-28 PROCEDURE — 1036F TOBACCO NON-USER: CPT | Performed by: INTERNAL MEDICINE

## 2024-08-28 PROCEDURE — 3050F LDL-C >= 130 MG/DL: CPT | Performed by: INTERNAL MEDICINE

## 2024-08-28 PROCEDURE — 3078F DIAST BP <80 MM HG: CPT | Performed by: INTERNAL MEDICINE

## 2024-08-28 PROCEDURE — 99214 OFFICE O/P EST MOD 30 MIN: CPT | Performed by: INTERNAL MEDICINE

## 2024-08-28 RX ORDER — CITALOPRAM 40 MG/1
40 TABLET, FILM COATED ORAL DAILY
Qty: 90 TABLET | Refills: 3 | Status: SHIPPED | OUTPATIENT
Start: 2024-08-28 | End: 2025-08-28

## 2024-08-28 ASSESSMENT — PAIN SCALES - GENERAL: PAINLEVEL: 0-NO PAIN

## 2024-08-28 NOTE — ASSESSMENT & PLAN NOTE
PT evaluation. She does use a cane but has some limitation in community activity. Perform evaluation for insurance covered eligibility for scooter.

## 2024-08-28 NOTE — PROGRESS NOTES
Subjective   Patient ID: Mary See is a 74 y.o. female who presents for Follow-up.  Mary had an uncomplicated L reverse shoulder replacement a month ago. She is doing very well.    She had side effects from semaglutide at the 0.25mg dose, these were worse at 0.5mg dose. Her complaints were a variety of GI SE, mainly constipation, but bloating,belching and gas were present. She used Colace/Miralax/MOM. She had bowel movements but then had diarrhea. At the next dosing, the GI SE would start again. She does not appear to have any unusual preoccupation with Bms.    She did initially lose some weight while on semaglutide.      Current Outpatient Medications   Medication Instructions    acetaminophen (TYLENOL EXTRA STRENGTH) 1,000 mg, oral, Every 4 hours PRN    albuterol 90 mcg/actuation inhaler USE 2 INHALATIONS BY MOUTH  EVERY 6 HOURS AS NEEDED FOR SHORTNESS OF BREATH    albuterol 2.5 mg, nebulization, Every 8 hours PRN    ALPRAZolam XR (XANAX XR) 0.5 mg, oral, Daily    ascorbic acid (VITAMIN C ORAL) 1 tablet, oral, Daily    calcium carbonate-vitamin D3 600 mg-20 mcg (800 unit) tablet 1 tablet, oral, 2 times daily    carboxymethylcellulose-glycern 0.5-0.9 % drops Use 1-2 Drops in both eyes as needed.    cetirizine (ZyrTEC) 10 mg tablet 1 tablet, oral, Daily    cholecalciferol (VITAMIN D-3) 125 mcg, oral, Daily    citalopram (CELEXA) 40 mg, oral, Daily    clobetasol (Temovate) 0.05 % cream APPLY TO AFFECTED AREA(S) TOPICALLY ONCE DAILY FOR 1 WEEK, THEN TWICE WEEKLY FOR MAINTENANCE.    clobetasol (Temovate) 0.05 % ointment APPLY TOPICALLY TO AFFECTED  AREA(S) TWICE PER WEEK.    diclofenac sodium (VOLTAREN ARTHRITIS PAIN) 4 g, Topical, 2 times daily PRN    docusate sodium (COLACE) 100 mg, oral, 2 times daily PRN    estradiol (Estrace) 0.1 MG/GM vaginal cream 1 Applicatorful, vaginal, 3 times weekly    fluticasone (Flonase) 50 mcg/actuation nasal spray 2 sprays, Each Nostril, Daily    furosemide (LASIX) 20 mg, oral,  Daily PRN    gabapentin (NEURONTIN) 300 mg, oral, 2 times daily    glucosamine-chondroit-vit C-Mn (Glucosamine Chondroitin MaxStr) 500-400 mg capsule 1,500 mg, oral, Daily    guaiFENesin (Mucinex) 1,200 mg tablet extended release 12hr 1 tablet, oral, Every 12 hours, FOR CONGESTION    ketoconazole (NIZOral) 2 % cream Topical, 2 times daily    krill/om3/dha/epa/om6/lip/astx (KRILL OIL, OMEGA 3 AND 6, ORAL) 1 capsule, oral, Daily    levothyroxine (SYNTHROID, LEVOXYL) 150 mcg, oral, Daily    losartan (COZAAR) 50 mg, oral, Daily    magnesium oxide-Mg AA chelate (Magnesium, oxide/AA chelate,) 300 mg capsule 1 capsule, oral, Daily    melatonin 3 mg tablet Take 10 mg by mouth once daily at bedtime.    montelukast (SINGULAIR) 10 mg, oral, Daily, as directed    multivitamin with minerals (multivit-min-iron fum-folic ac) tablet 1 tablet, oral, Daily    nystatin (Mycostatin) 100,000 unit/gram powder APPLY 2-3 TIMES DAILY TO AFFECTED AREA(S).  As needed.    omeprazole (PRILOSEC) 40 mg, oral, 2 times daily    simethicone (MYLICON) 80 mg, oral, Every 6 hours PRN    triamterene-hydrochlorothiazid (Maxzide-25) 37.5-25 mg tablet 1 tablet, oral, Daily    Wixela Inhub 250-50 mcg/dose diskus inhaler 1 puff, inhalation, Every 12 hours    zinc sulfate (Zincate) 220 (50 Zn) MG capsule 1 capsule, oral, Daily     Review of Systems    Objective   /76 (BP Location: Left arm, Patient Position: Sitting, BP Cuff Size: Adult)   Pulse 68   Temp 36.3 °C (97.3 °F) (Oral)   Wt 105 kg (230 lb 12.8 oz)   BMI 40.88 kg/m²   Physical Exam      Assessment/Plan   Problem List Items Addressed This Visit             ICD-10-CM    Ankle and foot deformity, acquired M21.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 She does use a cane but has some limitation in community activity. Perform evaluation for insurance covered eligibility for scooter.          Relevant Orders    Referral to Physical Therapy    Morbid obesity (Multi) - Primary E66.01     We will dial back to semaglutide 0.25mg dose as she had some weight loss and less GI SE. Tirzepatide was apparently not going to have insurance coverage.     I gave her another sample. We can reassess progress in 6-8 weeks.         Charcot's joint of left foot M14.672    Relevant Orders    Referral to Physical Therapy    Charcot's joint of right foot M14.671    Relevant Orders    Referral to Physical Therapy

## 2024-08-28 NOTE — ASSESSMENT & PLAN NOTE
We will dial back to semaglutide 0.25mg dose as she had some weight loss and less GI SE. Tirzepatide was apparently not going to have insurance coverage.     I gave her another sample. We can reassess progress in 6-8 weeks.

## 2024-09-09 DIAGNOSIS — Z12.11 COLON CANCER SCREENING: ICD-10-CM

## 2024-09-09 RX ORDER — POLYETHYLENE GLYCOL 3350, SODIUM SULFATE ANHYDROUS, SODIUM BICARBONATE, SODIUM CHLORIDE, POTASSIUM CHLORIDE 236; 22.74; 6.74; 5.86; 2.97 G/4L; G/4L; G/4L; G/4L; G/4L
POWDER, FOR SOLUTION ORAL
Qty: 4000 ML | Refills: 0 | Status: SHIPPED | OUTPATIENT
Start: 2024-09-09

## 2024-09-16 ENCOUNTER — APPOINTMENT (OUTPATIENT)
Dept: PHARMACY | Facility: HOSPITAL | Age: 74
End: 2024-09-16
Payer: MEDICARE

## 2024-09-16 DIAGNOSIS — E66.01 MORBID (SEVERE) OBESITY DUE TO EXCESS CALORIES (MULTI): ICD-10-CM

## 2024-09-17 NOTE — PROGRESS NOTES
Pharmacist Clinic: Weight Management    Mary See was referred to the Clinical Pharmacy Team for weight management.     Referring Provider: Skip Sal MD  Problem List Items Addressed This Visit    None  Visit Diagnoses       Morbid (severe) obesity due to excess calories (Multi)        Relevant Orders    Follow Up In [facility] - Pharmacy            HISTORY OF PRESENT ILLNESS    Patient originally started on Ozempic early June 2024, initially tolerated pretty well but eventually started experiencing more severe side effects burping/bloating, gas, constipation alternating with diarrhea.  She held Ozempic surrounding surgical procedure end of July.  She ultimately decided to continue holding Ozempic due to bowel irregularity after her surgery until she followed up with PCP 8/28/24.  She did resume Ozempic 0.25 mg weekly after that appointment and she reports the plan is for her to stay on 0.25 mg indefinitely.     Diet: coffee and water, meat, vegetables, fruit; avoids bread and pasta, intolerant of gluten    Exercise Routine: experiences pain in feet, issues walking.  active around the house.     Weight  220 lb (initial)  Has lost 24 pounds in first 4 weeks  <5 lb weight loss since last pharmacy follow up    PHARMACY ASSESSMENT  Pertinent PMH Review:  - PMH of Pancreatitis: No  - PMH of Retinopathy: No  - PMH of MTC: No    Allergies: Ibuprofen, Gluten, Adhesive tape-silicones, Lactose, Miconazole, and Soap     [id number] [address] [facility]  [address] [place], [place] [address]  Phone: [phone] Fax: [phone]    [facility] - [place], [place] - [address]  [address]  [address] [address]  Phone: [phone] Fax: [phone]    Affordability/Accessibility: Ozempic covered by insurance plan, off label use    DRUG INTERACTIONS  - No significant drug-drug interactions exist that require adjustment to  therapy    LAB  Lab Results   Component Value Date    BILITOT 0.5 05/28/2024    CALCIUM 9.0 07/18/2024    CO2 29 07/18/2024    CL 98 07/18/2024    CREATININE 0.90 07/18/2024    GLUCOSE 79 07/18/2024    ALKPHOS 96 05/28/2024    K 3.3 (L) 07/18/2024    PROT 6.6 05/28/2024     07/18/2024    AST 20 05/28/2024    ALT 15 05/28/2024    BUN 19 07/18/2024    ANIONGAP 13 07/18/2024    PHOS 2.3 (L) 05/11/2021     05/08/2021    ALBUMIN 4.2 05/28/2024    EGFR 67 07/18/2024     Lab Results   Component Value Date    TRIG 205 (H) 05/28/2024    CHOL 278 (H) 05/28/2024    LDLCALC 169 (H) 05/28/2024    HDL 67.6 05/28/2024     Lab Results   Component Value Date    HGBA1C 4.9 07/18/2024       Current Outpatient Medications on File Prior to Visit   Medication Sig Dispense Refill    acetaminophen (Tylenol Extra Strength) 500 mg tablet Take 2 tablets (1,000 mg) by mouth every 4 hours if needed.      albuterol 2.5 mg /3 mL (0.083 %) nebulizer solution Take 3 mL (2.5 mg) by nebulization every 8 hours if needed.      albuterol 90 mcg/actuation inhaler USE 2 INHALATIONS BY MOUTH  EVERY 6 HOURS AS NEEDED FOR SHORTNESS OF BREATH 34 g 3    ALPRAZolam XR (Xanax XR) 0.5 mg 24 hr tablet Take 1 tablet (0.5 mg) by mouth once daily. 90 tablet 0    ascorbic acid (VITAMIN C ORAL) Take 1 tablet by mouth once daily.      calcium carbonate-vitamin D3 600 mg-20 mcg (800 unit) tablet Take 1 tablet by mouth 2 times a day.      carboxymethylcellulose-glycern 0.5-0.9 % drops Use 1-2 Drops in both eyes as needed.      cetirizine (ZyrTEC) 10 mg tablet Take 1 tablet (10 mg) by mouth once daily.      cholecalciferol (Vitamin D-3) 125 MCG (5000 UT) capsule Take 1 capsule (125 mcg) by mouth once daily.      citalopram (CeleXA) 40 mg tablet Take 1 tablet (40 mg) by mouth once daily. 90 tablet 3    clobetasol (Temovate) 0.05 % cream APPLY TO AFFECTED AREA(S) TOPICALLY ONCE DAILY FOR 1 WEEK, THEN TWICE WEEKLY FOR MAINTENANCE.      clobetasol (Temovate) 0.05  % ointment APPLY TOPICALLY TO AFFECTED  AREA(S) TWICE PER WEEK. 60 g 0    diclofenac sodium (Voltaren Arthritis Pain) 1 % gel Apply 4.5 inches (4 g) topically 2 times a day as needed.      docusate sodium (Colace) 100 mg capsule Take 1 capsule (100 mg) by mouth 2 times a day as needed for constipation.      estradiol (Estrace) 0.1 MG/GM vaginal cream Insert 1 Applicatorful into the vagina 3 times a week.      fluticasone (Flonase) 50 mcg/actuation nasal spray Administer 2 sprays into each nostril once daily. 48 g 3    furosemide (Lasix) 20 mg tablet Take 1 tablet (20 mg) by mouth once daily as needed (edema). 90 tablet 3    gabapentin (Neurontin) 300 mg capsule Take 1 capsule (300 mg) by mouth 2 times a day. 810 capsule 3    glucosamine-chondroit-vit C-Mn (Glucosamine Chondroitin MaxStr) 500-400 mg capsule Take 1,500 mg by mouth once daily.      guaiFENesin (Mucinex) 1,200 mg tablet extended release 12hr Take 1 tablet (1,200 mg) by mouth every 12 hours. FOR CONGESTION      ketoconazole (NIZOral) 2 % cream Apply topically twice a day.      krill/om3/dha/epa/om6/lip/astx (KRILL OIL, OMEGA 3 AND 6, ORAL) Take 1 capsule by mouth once daily.      levothyroxine (Synthroid, Levoxyl) 150 mcg tablet TAKE 1 TABLET BY MOUTH ONCE  DAILY 90 tablet 3    losartan (Cozaar) 50 mg tablet TAKE 1 TABLET BY MOUTH ONCE  DAILY 90 tablet 3    magnesium oxide-Mg AA chelate (Magnesium, oxide/AA chelate,) 300 mg capsule Take 1 capsule (300 mg) by mouth once daily.      melatonin 3 mg tablet Take 10 mg by mouth once daily at bedtime.      montelukast (Singulair) 10 mg tablet TAKE 1 TABLET BY MOUTH DAILY AS  DIRECTED 90 tablet 3    multivitamin with minerals (multivit-min-iron fum-folic ac) tablet Take 1 tablet by mouth once daily.      nystatin (Mycostatin) 100,000 unit/gram powder APPLY 2-3 TIMES DAILY TO AFFECTED AREA(S).  As needed.      omeprazole (PriLOSEC) 40 mg DR capsule TAKE 1 CAPSULE BY MOUTH TWICE  DAILY 180 capsule 3     polyethylene glycol (GoLYTELY) 236-22.74-6.74 -5.86 gram solution Drink 1/2 starting at 6 pm the night before your procedure then drink the 2nd 1/2 5 hours before procedure arrival time 4000 mL 0    simethicone (Mylicon) 80 mg chewable tablet Chew 1 tablet (80 mg) every 6 hours if needed.      triamterene-hydrochlorothiazid (Maxzide-25) 37.5-25 mg tablet TAKE 1 TABLET BY MOUTH DAILY 90 tablet 3    Wixela Inhub 250-50 mcg/dose diskus inhaler USE 1 INHALATION BY MOUTH EVERY  12 HOURS 180 each 3    zinc sulfate (Zincate) 220 (50 Zn) MG capsule Take 1 capsule (50 mg of elemental zinc) by mouth once daily.       No current facility-administered medications on file prior to visit.       PATIENT EDUCATION/GOALS  - Counseled patient on MOA, expectations, side effects, duration of therapy, contraindications, administration, and monitoring parameters  - Answered all patient questions and concerns; provided [facility] phone number if issues/questions arise    RECOMMENDATIONS/PLAN  Weight Loss Plan: Patient restarted on Ozempic 0.25 mg weekly, has not been noticing the GI side effects she was experiencing in the past but also reports she is not noticing appetite reduction like she was initially.    Plan to continue on this dose, patient prefers to discuss dose adjustments with PCP directly.   Patient states no refills needed at this time    Clinical Pharmacist follow up: 10/7/24 130 pm, check in on side effects   PCP follow up: 11/20/24    Continue all meds under the continuation of care with the referring provider and clinical pharmacy team.    Stefanie Thacker, PharmD  Clinical Pharmacy Specialist, Primary Care   [phone]    Verbal consent to manage patient's drug therapy was obtained from patient. They were informed they may decline to participate or withdraw from participation in pharmacy services at any time.

## 2024-09-20 DIAGNOSIS — F41.9 ANXIETY: ICD-10-CM

## 2024-09-22 NOTE — PROGRESS NOTES
Subjective   Patient ID: Mary See is a 74 y.o. female    Chief Complaint: No chief complaint on file.       Last Surgery: Left Shoulder Reverse Total Arthroplasty - Left  Date of Last Surgery: 7/26/2024    HPI  Mary See is a 74 y.o. right hand dominant female presenting for her second post operative visit. She is s/p left reverse total shoulder replacement done on 7/26/24.     She explains she is feeling well. She did get an ice machine and her post operative pain was minimal. She required very little of her narcotics and is happy with her surgery.     Objective   Patient is a well-developed, well nourished female in no acute distress. Awake, alert and oriented x3, with appropriate mood. Breathes with normal chest rises. Eye exam reveals round pupils with clear sclera. Gait is steady.    Patient had full range of motion of bilateral elbows. 5 out of 5 bilateral wrist flexion and extension. Thumb extension positive bilaterally. Sensation was intact to light touch to median, ulnar, radial, and axillary nerves bilaterally. Positive radial pulse bilaterally. Cervical lymphadenopathy examined, and this was negative.    Examination of the left shoulder today reveals the skin to be intact. There is no sign any atrophy lesions or abrasions. There is no pain to palpation of the bony prominences. Range of motion of the left shoulder revealed 0-170° of forward elevation. 0-60° of external rotation. Internal rotation was to T12. Provocative maneuvers today were negative.    Patient left shoulder incision is dry, intact and healing well. No swelling. No ecchymosis. No erythema or warmth. Nonabsorbable suture was removed today. Neurovascularly intact distally. 5 out of 5 wrist, hand and thumb flexion and extension without pain. Full range of motion of elbow. 0-140° of forward elevation. 0-20° external rotation. She can internally rotate to L-5.     Imaging:    X-rays of the left shoulder taken today personally ordered and  interpreted by me show excellent alignment of the reverse replacement.       Assessment/Plan   No diagnosis found.  Patient 10 weeks s/p status post left reverse total shoulder replacement done on 7/26/24.     She is doing excellent today. She may have her arm anywhere in space. She was given exercises to continue to do daily. She should continue to avoid lifting no more than 10 lbs. She may do activities as tolerated otherwise. We will see her back in 1 month with repeat x-rays at that time.     No orders of the defined types were placed in this encounter.        Follow up in 1 month with repeat x-rays.    Scribe Attestation  By signing my name below, ISanjuana Scribe   attest that this documentation has been prepared under the direction and in the presence of Sandro Nix MD.

## 2024-09-23 ENCOUNTER — APPOINTMENT (OUTPATIENT)
Dept: OBSTETRICS AND GYNECOLOGY | Facility: CLINIC | Age: 74
End: 2024-09-23
Payer: MEDICARE

## 2024-09-23 VITALS
SYSTOLIC BLOOD PRESSURE: 127 MMHG | BODY MASS INDEX: 40.4 KG/M2 | WEIGHT: 228 LBS | HEIGHT: 63 IN | HEART RATE: 66 BPM | DIASTOLIC BLOOD PRESSURE: 71 MMHG

## 2024-09-23 DIAGNOSIS — N95.8 GENITOURINARY SYNDROME OF MENOPAUSE: ICD-10-CM

## 2024-09-23 DIAGNOSIS — R21 SKIN RASH: Primary | ICD-10-CM

## 2024-09-23 DIAGNOSIS — N76.0 VULVOVAGINITIS: ICD-10-CM

## 2024-09-23 DIAGNOSIS — Z01.411 ENCOUNTER FOR GYNECOLOGICAL EXAMINATION WITH ABNORMAL FINDING: ICD-10-CM

## 2024-09-23 DIAGNOSIS — N90.4 LICHEN SCLEROSUS OF FEMALE GENITALIA: ICD-10-CM

## 2024-09-23 PROCEDURE — 4010F ACE/ARB THERAPY RXD/TAKEN: CPT | Performed by: OBSTETRICS & GYNECOLOGY

## 2024-09-23 PROCEDURE — 3050F LDL-C >= 130 MG/DL: CPT | Performed by: OBSTETRICS & GYNECOLOGY

## 2024-09-23 PROCEDURE — 3078F DIAST BP <80 MM HG: CPT | Performed by: OBSTETRICS & GYNECOLOGY

## 2024-09-23 PROCEDURE — 3044F HG A1C LEVEL LT 7.0%: CPT | Performed by: OBSTETRICS & GYNECOLOGY

## 2024-09-23 PROCEDURE — 3008F BODY MASS INDEX DOCD: CPT | Performed by: OBSTETRICS & GYNECOLOGY

## 2024-09-23 PROCEDURE — 87205 SMEAR GRAM STAIN: CPT

## 2024-09-23 PROCEDURE — 3074F SYST BP LT 130 MM HG: CPT | Performed by: OBSTETRICS & GYNECOLOGY

## 2024-09-23 PROCEDURE — 1159F MED LIST DOCD IN RCRD: CPT | Performed by: OBSTETRICS & GYNECOLOGY

## 2024-09-23 PROCEDURE — 99214 OFFICE O/P EST MOD 30 MIN: CPT | Performed by: OBSTETRICS & GYNECOLOGY

## 2024-09-23 RX ORDER — ESTRADIOL 0.1 MG/G
0.5 CREAM VAGINAL 3 TIMES WEEKLY
Qty: 42.5 G | Refills: 2 | Status: SHIPPED | OUTPATIENT
Start: 2024-09-23

## 2024-09-23 RX ORDER — NYSTATIN 100000 [USP'U]/G
POWDER TOPICAL DAILY
Qty: 60 G | Refills: 2 | Status: SHIPPED | OUTPATIENT
Start: 2024-09-23

## 2024-09-23 RX ORDER — CLOBETASOL PROPIONATE 0.5 MG/G
OINTMENT TOPICAL 2 TIMES WEEKLY
Qty: 60 G | Refills: 1 | Status: SHIPPED | OUTPATIENT
Start: 2024-09-23

## 2024-09-23 RX ORDER — ALPRAZOLAM 0.5 MG/1
0.5 TABLET, EXTENDED RELEASE ORAL DAILY
Qty: 90 TABLET | Refills: 0 | Status: SHIPPED | OUTPATIENT
Start: 2024-09-23

## 2024-09-23 NOTE — PROGRESS NOTES
Subjective   Mary See is a 74 y.o. female here for GYN care.  She has a history of lichen sclerosis.  She does continue with significant dryness, itchiness and burning of the bilateral vulva but more uncomfortable on the left vulva.  It affects her daily activities.  She is always using some type of topical agent, either the clobetasol ointment, the estradiol cream or Aquaphor.    She denies a discharge.  There is no postmenopausal bleeding.  No dysuria or change in bowel habits.    She is a retired nurse.  Personal health questionnaire reviewed: yes.     Gynecologic History  No LMP recorded. Patient is postmenopausal.  Contraception: post menopausal status  Last Pap: 19. Results were: normal  Last mammogram: 23. Results were: normal    Obstetric History  OB History    Para Term  AB Living   1 1           SAB IAB Ectopic Multiple Live Births                  # Outcome Date GA Lbr Stephan/2nd Weight Sex Type Anes PTL Lv   1 Para                Objective   Constitutional: Alert and in no acute distress. Well developed, well nourished.   Head and Face: Head and face: Normal.    Eyes: Normal external exam - nonicteric sclera, extraocular movements intact (EOMI) and no ptosis.   Neck: No neck asymmetry. Supple. Thyroid not enlarged and there were no palpable thyroid nodules.    Pulmonary: No respiratory distress.   Chest: Breasts: Normal appearance, no nipple discharge and no skin changes. Palpation of breasts and axillae: No palpable mass and no axillary lymphadenopathy.   Abdomen: Soft nontender; no abdominal mass palpated. No organomegaly. No hernias.   Genitourinary: External genitalia: The bilateral labia minora have resorbed.  On the left upper vulva there is a horizontal red vascular area.  In total, about 1 cm length, about 3 mm in width.  It is slightly raised in areas.  There is no sign of infection or break in the skin.  No inguinal lymphadenopathy. Bartholin's Urethral and Skenes  Glands: Normal. Urethra: Normal.  Bladder: Normal on palpation. Vagina: Normal. Cervix: Normal.  Uterus: Normal.  Right Adnexa/parametria: Normal.  Left Adnexa/parametria: Normal.  Inspection of Perianal Area: Normal.   Musculoskeletal: No joint swelling seen, normal movements of all extremities.   Skin: Normal skin color and pigmentation, normal skin turgor, and no rash.   Neurologic: Non-focal. Grossly intact.   Psychiatric: Alert and oriented x 3. Affect normal to patient baseline. Mood: Appropriate.  Physical Exam     Assessment/Plan   This is a 74-year-old female that has extreme vulvovaginitis despite using estradiol cream, clobetasol ointment and Aquaphor.  We reviewed the frequency of her topicals, the clobetasol ointment could be used twice weekly.  The estradiol cream should be used 3 times weekly.  She can use Aquaphor intermittently as needed.    I do recommend returning for biopsy of the left vulvar vascular area.    A swab was sent for BV and yeast due to itchiness.  She does have occasional yeast symptoms in the groin or in folds of the skin and nystatin powder was ordered to the mail-order pharmacy.    I will see her soon for biopsy.    Mammogram ordered.

## 2024-09-24 ENCOUNTER — OFFICE VISIT (OUTPATIENT)
Dept: ORTHOPEDIC SURGERY | Facility: HOSPITAL | Age: 74
End: 2024-09-24
Payer: MEDICARE

## 2024-09-24 DIAGNOSIS — Z96.612 STATUS POST REVERSE TOTAL SHOULDER REPLACEMENT, LEFT: Primary | ICD-10-CM

## 2024-09-24 PROCEDURE — 4010F ACE/ARB THERAPY RXD/TAKEN: CPT | Performed by: ORTHOPAEDIC SURGERY

## 2024-09-24 PROCEDURE — 3044F HG A1C LEVEL LT 7.0%: CPT | Performed by: ORTHOPAEDIC SURGERY

## 2024-09-24 PROCEDURE — 3050F LDL-C >= 130 MG/DL: CPT | Performed by: ORTHOPAEDIC SURGERY

## 2024-09-24 PROCEDURE — 99211 OFF/OP EST MAY X REQ PHY/QHP: CPT | Performed by: ORTHOPAEDIC SURGERY

## 2024-09-25 ENCOUNTER — APPOINTMENT (OUTPATIENT)
Dept: RADIOLOGY | Facility: CLINIC | Age: 74
End: 2024-09-25
Payer: MEDICARE

## 2024-09-25 ENCOUNTER — HOSPITAL ENCOUNTER (OUTPATIENT)
Dept: RADIOLOGY | Facility: CLINIC | Age: 74
Discharge: HOME | End: 2024-09-25
Payer: MEDICARE

## 2024-09-25 VITALS — WEIGHT: 228 LBS | BODY MASS INDEX: 40.4 KG/M2 | HEIGHT: 63 IN

## 2024-09-25 DIAGNOSIS — Z12.31 ENCOUNTER FOR SCREENING MAMMOGRAM FOR MALIGNANT NEOPLASM OF BREAST: ICD-10-CM

## 2024-09-25 PROCEDURE — 77063 BREAST TOMOSYNTHESIS BI: CPT | Performed by: STUDENT IN AN ORGANIZED HEALTH CARE EDUCATION/TRAINING PROGRAM

## 2024-09-25 PROCEDURE — 77067 SCR MAMMO BI INCL CAD: CPT

## 2024-09-25 PROCEDURE — 77067 SCR MAMMO BI INCL CAD: CPT | Performed by: STUDENT IN AN ORGANIZED HEALTH CARE EDUCATION/TRAINING PROGRAM

## 2024-09-26 ENCOUNTER — TELEPHONE (OUTPATIENT)
Dept: OBSTETRICS AND GYNECOLOGY | Facility: CLINIC | Age: 74
End: 2024-09-26
Payer: MEDICARE

## 2024-09-26 LAB
BACTERIAL VAGINOSIS VAG-IMP: NORMAL
CLUE CELLS VAG LPF-#/AREA: NORMAL /[LPF]
NUGENT SCORE: 4
YEAST VAG WET PREP-#/AREA: NORMAL

## 2024-09-26 NOTE — TELEPHONE ENCOUNTER
Pt called stating her [facility] insurance is asking for a call regarding her RX for Estradiol prior to filling (order# [id number]) Pt wanted me to let you know that in case the insurance asks, she has tried Premarin in the past and cannot use it because it burns.     [facility] [phone]    Anne Leiva

## 2024-10-07 ENCOUNTER — APPOINTMENT (OUTPATIENT)
Dept: PHARMACY | Facility: HOSPITAL | Age: 74
End: 2024-10-07
Payer: MEDICARE

## 2024-10-07 DIAGNOSIS — E66.01 MORBID (SEVERE) OBESITY DUE TO EXCESS CALORIES (MULTI): ICD-10-CM

## 2024-10-07 NOTE — PROGRESS NOTES
Pharmacist Clinic: Weight Management    Mary See was referred to the Clinical Pharmacy Team for weight management.     Referring Provider: Skip Sal MD  Problem List Items Addressed This Visit    None  Visit Diagnoses       Morbid (severe) obesity due to excess calories (Multi)                HISTORY OF PRESENT ILLNESS    Patient originally started on Ozempic early June 2024, initially tolerated pretty well but eventually started experiencing more severe side effects burping/bloating, gas, constipation alternating with diarrhea.  She held Ozempic surrounding surgical procedure end of July.  She ultimately decided to continue holding Ozempic due to bowel irregularity after her surgery until she followed up with PCP 8/28/24.  She did resume Ozempic 0.25 mg weekly after that appointment and she reports the plan is for her to stay on 0.25 mg indefinitely.  She is currently reporting no side effects and is noticing appetite suppression.     Diet: coffee and water, meat, vegetables, fruit; avoids bread and pasta, intolerant of gluten    Exercise Routine: experiences pain in feet, issues walking.  active around the house.     Weight  220 lb (initial)  Has lost 24 pounds in first 4 weeks  <5 lb weight loss since last pharmacy follow up    PHARMACY ASSESSMENT  Pertinent PMH Review:  - PMH of Pancreatitis: No  - PMH of Retinopathy: No  - PMH of MTC: No    Allergies: Ibuprofen, Gluten, Adhesive tape-silicones, Lactose, Miconazole, and Soap     [id number] - [place], [place] - [address] [facility]  [address] [facility] [address]  Phone: [phone] Fax: [phone]    [facility] - [place], [place] - [address]  [address]  [address] [address]  Phone: [phone] Fax: [phone]    Affordability/Accessibility: Ozempic covered by insurance plan, off label use    DRUG INTERACTIONS  - No significant drug-drug interactions exist that require  adjustment to therapy    LAB  Lab Results   Component Value Date    BILITOT 0.5 05/28/2024    CALCIUM 9.0 07/18/2024    CO2 29 07/18/2024    CL 98 07/18/2024    CREATININE 0.90 07/18/2024    GLUCOSE 79 07/18/2024    ALKPHOS 96 05/28/2024    K 3.3 (L) 07/18/2024    PROT 6.6 05/28/2024     07/18/2024    AST 20 05/28/2024    ALT 15 05/28/2024    BUN 19 07/18/2024    ANIONGAP 13 07/18/2024    PHOS 2.3 (L) 05/11/2021     05/08/2021    ALBUMIN 4.2 05/28/2024    EGFR 67 07/18/2024     Lab Results   Component Value Date    TRIG 205 (H) 05/28/2024    CHOL 278 (H) 05/28/2024    LDLCALC 169 (H) 05/28/2024    HDL 67.6 05/28/2024     Lab Results   Component Value Date    HGBA1C 4.9 07/18/2024       Current Outpatient Medications on File Prior to Visit   Medication Sig Dispense Refill    acetaminophen (Tylenol Extra Strength) 500 mg tablet Take 2 tablets (1,000 mg) by mouth every 4 hours if needed.      albuterol 2.5 mg /3 mL (0.083 %) nebulizer solution Take 3 mL (2.5 mg) by nebulization every 8 hours if needed.      albuterol 90 mcg/actuation inhaler USE 2 INHALATIONS BY MOUTH  EVERY 6 HOURS AS NEEDED FOR SHORTNESS OF BREATH 34 g 3    ALPRAZolam XR (Xanax XR) 0.5 mg 24 hr tablet Take 1 tablet (0.5 mg) by mouth once daily. 90 tablet 0    ascorbic acid (VITAMIN C ORAL) Take 1 tablet by mouth once daily.      calcium carbonate-vitamin D3 600 mg-20 mcg (800 unit) tablet Take 1 tablet by mouth 2 times a day.      carboxymethylcellulose-glycern 0.5-0.9 % drops Use 1-2 Drops in both eyes as needed.      cetirizine (ZyrTEC) 10 mg tablet Take 1 tablet (10 mg) by mouth once daily.      cholecalciferol (Vitamin D-3) 125 MCG (5000 UT) capsule Take 1 capsule (125 mcg) by mouth once daily.      citalopram (CeleXA) 40 mg tablet Take 1 tablet (40 mg) by mouth once daily. 90 tablet 3    clobetasol (Temovate) 0.05 % cream APPLY TO AFFECTED AREA(S) TOPICALLY ONCE DAILY FOR 1 WEEK, THEN TWICE WEEKLY FOR MAINTENANCE.      clobetasol  (Temovate) 0.05 % ointment Apply topically 2 times a week. 60 g 1    diclofenac sodium (Voltaren Arthritis Pain) 1 % gel Apply 4.5 inches (4 g) topically 2 times a day as needed.      docusate sodium (Colace) 100 mg capsule Take 1 capsule (100 mg) by mouth 2 times a day as needed for constipation.      estradiol (Estrace) 0.01 % (0.1 mg/gram) vaginal cream Insert 0.125 Applicatorfuls (0.5 g) into the vagina 3 times a week. Use a pea-sized amount at the vaginal opening at night 3 times weekly. 42.5 g 2    fluticasone (Flonase) 50 mcg/actuation nasal spray Administer 2 sprays into each nostril once daily. 48 g 3    furosemide (Lasix) 20 mg tablet Take 1 tablet (20 mg) by mouth once daily as needed (edema). 90 tablet 3    gabapentin (Neurontin) 300 mg capsule Take 1 capsule (300 mg) by mouth 2 times a day. 810 capsule 3    glucosamine-chondroit-vit C-Mn (Glucosamine Chondroitin MaxStr) 500-400 mg capsule Take 1,500 mg by mouth once daily.      guaiFENesin (Mucinex) 1,200 mg tablet extended release 12hr Take 1 tablet (1,200 mg) by mouth every 12 hours. FOR CONGESTION      ketoconazole (NIZOral) 2 % cream Apply topically twice a day.      krill/om3/dha/epa/om6/lip/astx (KRILL OIL, OMEGA 3 AND 6, ORAL) Take 1 capsule by mouth once daily.      levothyroxine (Synthroid, Levoxyl) 150 mcg tablet TAKE 1 TABLET BY MOUTH ONCE  DAILY 90 tablet 3    losartan (Cozaar) 50 mg tablet TAKE 1 TABLET BY MOUTH ONCE  DAILY 90 tablet 3    magnesium oxide-Mg AA chelate (Magnesium, oxide/AA chelate,) 300 mg capsule Take 1 capsule (300 mg) by mouth once daily.      melatonin 3 mg tablet Take 10 mg by mouth once daily at bedtime.      montelukast (Singulair) 10 mg tablet TAKE 1 TABLET BY MOUTH DAILY AS  DIRECTED 90 tablet 3    multivitamin with minerals (multivit-min-iron fum-folic ac) tablet Take 1 tablet by mouth once daily.      nystatin (Mycostatin) 100,000 unit/gram powder Apply topically once daily. 60 g 2    omeprazole (PriLOSEC) 40 mg  DR capsule TAKE 1 CAPSULE BY MOUTH TWICE  DAILY 180 capsule 3    polyethylene glycol (GoLYTELY) 236-22.74-6.74 -5.86 gram solution Drink 1/2 starting at 6 pm the night before your procedure then drink the 2nd 1/2 5 hours before procedure arrival time 4000 mL 0    simethicone (Mylicon) 80 mg chewable tablet Chew 1 tablet (80 mg) every 6 hours if needed.      triamterene-hydrochlorothiazid (Maxzide-25) 37.5-25 mg tablet TAKE 1 TABLET BY MOUTH DAILY 90 tablet 3    Wixela Inhub 250-50 mcg/dose diskus inhaler USE 1 INHALATION BY MOUTH EVERY  12 HOURS 180 each 3    zinc sulfate (Zincate) 220 (50 Zn) MG capsule Take 1 capsule (50 mg of elemental zinc) by mouth once daily.       No current facility-administered medications on file prior to visit.       PATIENT EDUCATION/GOALS  - Counseled patient on MOA, expectations, side effects, duration of therapy, contraindications, administration, and monitoring parameters  - Answered all patient questions and concerns; provided [facility] phone number if issues/questions arise    RECOMMENDATIONS/PLAN  Weight Loss Plan: Continue Ozempic 0.25 mg weekly  Plan to continue on this dose  Patient requests a refill sent in to [facility] for mail, would like prescription sent at the end of October as she is out of town currently, set reminder to send prescription     Clinical Pharmacist follow up: as needed, patient provided pharmacist contact information with questions or concerns  PCP follow up: 11/20/24    Continue all meds under the continuation of care with the referring provider and clinical pharmacy team.    Stefanie Thacker, PharmD  Clinical Pharmacy Specialist, Primary Care   [phone]    Verbal consent to manage patient's drug therapy was obtained from patient. They were informed they may decline to participate or withdraw from participation in pharmacy services at any time.

## 2024-10-15 ENCOUNTER — CLINICAL SUPPORT (OUTPATIENT)
Dept: PHYSICAL THERAPY | Facility: CLINIC | Age: 74
End: 2024-10-15
Payer: MEDICARE

## 2024-10-15 DIAGNOSIS — M14.671 CHARCOT'S JOINT OF RIGHT FOOT: ICD-10-CM

## 2024-10-15 DIAGNOSIS — M14.672 CHARCOT'S JOINT OF LEFT FOOT: ICD-10-CM

## 2024-10-15 DIAGNOSIS — M51.369 DISC DEGENERATION, LUMBAR: ICD-10-CM

## 2024-10-15 PROCEDURE — 97542 WHEELCHAIR MNGMENT TRAINING: CPT | Mod: GP

## 2024-10-15 ASSESSMENT — ENCOUNTER SYMPTOMS
LOSS OF SENSATION IN FEET: 0
DEPRESSION: 1
OCCASIONAL FEELINGS OF UNSTEADINESS: 1

## 2024-10-15 NOTE — PROGRESS NOTES
Physical Therapy Evaluation    Patient Name: Mary See  MRN: [id number]  Today's Date: 10/15/24     Time Calculation  Start Time: 0045  Stop Time: 0210  Time Calculation (min): 85 min  Insurance:  Visit number: 1 of 1 - WC evaluation   Insurance Type: Payor: MEDICARE / Plan: MEDICARE PART A AND B / Product Type: *No Product type* /   Authorization or Plan of Care date Range:     Therapy diagnoses:   1. Disc degeneration, lumbar  Referral to Physical Therapy      2. Charcot's joint of left foot  Referral to Physical Therapy      3. Charcot's joint of right foot  Referral to Physical Therapy             Precautions:  VERONICA TRSA, veronica ROBERTO, VERONICA TKA, right ankle surgery  Charcot foot veronica   HTN, Asthma, veronica foot drop, falls, safety, GERD, pain, fibromyalgia   VERONICA hand OA deformity, VERONICA foot OA deformity with charcot foot   Fall Risk: Moderate    SUBJECTIVE:  Reports severe foot pain with WB activity and walking. Has an intense burning pain in the feet. Has inability to do much on her feet. The pain is unbearable by 15 min. Is unable to concentrate on anything when the pain strikes.   Unable to sleep at night due to pain.   Charcot foot  veronica = wearing orthotics and metal upright ankle braces.   Significant numbness in veronica feet with WB.     Using a cane.    at times assist with dressing feet.     Pain:  4/10 at rest     Home Living:  Bed room on 2nd floor, has veronica HR     Prior level of function:  INDP ADL   Walk in shower put in   Does some small ADL activity of cooking and cleaning     OBJECTIVE:  ** see Félix Lema  evaluation form scanned into the system**    ASSESSMENT:  Current gait is modified independent of cane with a short step length, wide ENRIQUE, no hip extension, minimal to no knee or hip flexion, using trunk sway for swing limb advancement, using veronica braces for foot drop.   Pt most limiting factor is intolerance of WB and pain. Pt develops pain within minutes of standing. Pt with a highly antalgic  gait that is at a high risk of falls. Pt has braces for foot drop and foot deformity. Pt has limited ability to stride in gait due to balance, weakness, and fear of falling. Uses a wide stance for balance and gait.   Pt  has health issues that compromise is ability to physically assist her in ADL and IADL. Pt can transfer mod indp of UE as well as perform bed mobility with mod indp of UE.   Pt ha significant foot deformity. pt has an unstable LE for balance or gait. Pt with need for a power assisted device due to the deformity of both of her hands as well as vanita total reverse shoulder replacements.   Pt has UE and LE weakness that prevents use for propulsion. Pt has a significant history of falls. Pt will need assistance for safety.   Pt presents with the following deficits/problems that indicate a skilled need for PT:   Gait, safety, falls, strength, functional mobility tolerance     Level of Complexity: MOD     TREATMENT:  NA     PATIENT EDUCATION:  HEP  goals  safety  POC  interventions selected      PLAN:   Pt to be seen 1 times for WC assessment     Pt POC to include: NA   Therapeutic EX, Therapeutic ACT, NMR, Self care, Manual therapy, Gait training, CP/MHP, Education      Rehab potential:  Fair   Plan of care agreement  Patient   GOALS:  Active       PT Problem       PT Goal 1       Start:  10/15/24    Expected End:  11/14/24       1) follow up with vendor regarding WC specs, insurance, and turn around time           1) follow up with vendor regarding WC specs, insurance, and turn around time

## 2024-10-18 DIAGNOSIS — Z79.2 PROPHYLACTIC ANTIBIOTIC: Primary | ICD-10-CM

## 2024-10-18 RX ORDER — AMOXICILLIN 500 MG/1
2000 TABLET, FILM COATED ORAL ONCE
Qty: 8 TABLET | Refills: 0 | Status: SHIPPED | OUTPATIENT
Start: 2024-10-18 | End: 2024-10-18

## 2024-10-23 ENCOUNTER — APPOINTMENT (OUTPATIENT)
Dept: GASTROENTEROLOGY | Facility: EXTERNAL LOCATION | Age: 74
End: 2024-10-23
Payer: MEDICARE

## 2024-10-23 DIAGNOSIS — D12.0 BENIGN NEOPLASM OF CECUM: ICD-10-CM

## 2024-10-23 DIAGNOSIS — Z86.0100 HISTORY OF COLONIC POLYPS: Primary | ICD-10-CM

## 2024-10-23 DIAGNOSIS — Z86.0100 HISTORY OF COLONIC POLYPS: ICD-10-CM

## 2024-10-23 DIAGNOSIS — Z12.11 COLON CANCER SCREENING: ICD-10-CM

## 2024-10-23 DIAGNOSIS — Z80.0 FH: COLON CANCER: ICD-10-CM

## 2024-10-23 PROCEDURE — 88305 TISSUE EXAM BY PATHOLOGIST: CPT | Performed by: STUDENT IN AN ORGANIZED HEALTH CARE EDUCATION/TRAINING PROGRAM

## 2024-10-23 PROCEDURE — 4010F ACE/ARB THERAPY RXD/TAKEN: CPT | Performed by: INTERNAL MEDICINE

## 2024-10-23 PROCEDURE — 88305 TISSUE EXAM BY PATHOLOGIST: CPT

## 2024-10-23 PROCEDURE — 45385 COLONOSCOPY W/LESION REMOVAL: CPT | Performed by: INTERNAL MEDICINE

## 2024-10-23 PROCEDURE — 3050F LDL-C >= 130 MG/DL: CPT | Performed by: INTERNAL MEDICINE

## 2024-10-23 PROCEDURE — 3044F HG A1C LEVEL LT 7.0%: CPT | Performed by: INTERNAL MEDICINE

## 2024-10-23 NOTE — PROGRESS NOTES
Colonoscopy done today for family history and history of polyps shows a single polyp measuring 14 mm, diverticulosis and hemorrhoids.  Repeat 3 years if the patient's medical issues are stable.

## 2024-10-24 ENCOUNTER — LAB REQUISITION (OUTPATIENT)
Dept: LAB | Facility: HOSPITAL | Age: 74
End: 2024-10-24
Payer: MEDICARE

## 2024-10-29 ENCOUNTER — OFFICE VISIT (OUTPATIENT)
Dept: ORTHOPEDIC SURGERY | Facility: HOSPITAL | Age: 74
End: 2024-10-29
Payer: MEDICARE

## 2024-10-29 ENCOUNTER — HOSPITAL ENCOUNTER (OUTPATIENT)
Dept: RADIOLOGY | Facility: HOSPITAL | Age: 74
Discharge: HOME | End: 2024-10-29
Payer: MEDICARE

## 2024-10-29 VITALS — WEIGHT: 228 LBS | HEIGHT: 63 IN | BODY MASS INDEX: 40.4 KG/M2

## 2024-10-29 DIAGNOSIS — Z47.89 ENCOUNTER FOR OTHER ORTHOPEDIC AFTERCARE: Primary | ICD-10-CM

## 2024-10-29 DIAGNOSIS — E66.01 MORBID (SEVERE) OBESITY DUE TO EXCESS CALORIES (MULTI): Primary | ICD-10-CM

## 2024-10-29 DIAGNOSIS — Z96.612 STATUS POST REVERSE TOTAL SHOULDER REPLACEMENT, LEFT: ICD-10-CM

## 2024-10-29 PROCEDURE — 99213 OFFICE O/P EST LOW 20 MIN: CPT | Performed by: NURSE PRACTITIONER

## 2024-10-29 PROCEDURE — 73030 X-RAY EXAM OF SHOULDER: CPT | Mod: LT

## 2024-10-29 PROCEDURE — 3008F BODY MASS INDEX DOCD: CPT | Performed by: NURSE PRACTITIONER

## 2024-10-29 PROCEDURE — 1159F MED LIST DOCD IN RCRD: CPT | Performed by: NURSE PRACTITIONER

## 2024-10-29 PROCEDURE — 3044F HG A1C LEVEL LT 7.0%: CPT | Performed by: NURSE PRACTITIONER

## 2024-10-29 PROCEDURE — 3050F LDL-C >= 130 MG/DL: CPT | Performed by: NURSE PRACTITIONER

## 2024-10-29 PROCEDURE — 1160F RVW MEDS BY RX/DR IN RCRD: CPT | Performed by: NURSE PRACTITIONER

## 2024-10-29 PROCEDURE — 4010F ACE/ARB THERAPY RXD/TAKEN: CPT | Performed by: NURSE PRACTITIONER

## 2024-10-29 ASSESSMENT — PAIN - FUNCTIONAL ASSESSMENT: PAIN_FUNCTIONAL_ASSESSMENT: NO/DENIES PAIN

## 2024-11-04 LAB
LABORATORY COMMENT REPORT: NORMAL
PATH REPORT.FINAL DX SPEC: NORMAL
PATH REPORT.GROSS SPEC: NORMAL
PATH REPORT.RELEVANT HX SPEC: NORMAL
PATH REPORT.TOTAL CANCER: NORMAL

## 2024-11-12 ENCOUNTER — APPOINTMENT (OUTPATIENT)
Dept: OBSTETRICS AND GYNECOLOGY | Facility: CLINIC | Age: 74
End: 2024-11-12
Payer: MEDICARE

## 2024-11-12 VITALS
BODY MASS INDEX: 41.29 KG/M2 | SYSTOLIC BLOOD PRESSURE: 112 MMHG | HEIGHT: 63 IN | DIASTOLIC BLOOD PRESSURE: 72 MMHG | WEIGHT: 233 LBS

## 2024-11-12 DIAGNOSIS — N90.89 VULVAL LESION: Primary | ICD-10-CM

## 2024-11-12 PROCEDURE — 88305 TISSUE EXAM BY PATHOLOGIST: CPT | Performed by: PATHOLOGY

## 2024-11-12 PROCEDURE — 3044F HG A1C LEVEL LT 7.0%: CPT | Performed by: OBSTETRICS & GYNECOLOGY

## 2024-11-12 PROCEDURE — 88305 TISSUE EXAM BY PATHOLOGIST: CPT

## 2024-11-12 PROCEDURE — 1159F MED LIST DOCD IN RCRD: CPT | Performed by: OBSTETRICS & GYNECOLOGY

## 2024-11-12 PROCEDURE — 1036F TOBACCO NON-USER: CPT | Performed by: OBSTETRICS & GYNECOLOGY

## 2024-11-12 PROCEDURE — 3008F BODY MASS INDEX DOCD: CPT | Performed by: OBSTETRICS & GYNECOLOGY

## 2024-11-12 PROCEDURE — 3078F DIAST BP <80 MM HG: CPT | Performed by: OBSTETRICS & GYNECOLOGY

## 2024-11-12 PROCEDURE — 4010F ACE/ARB THERAPY RXD/TAKEN: CPT | Performed by: OBSTETRICS & GYNECOLOGY

## 2024-11-12 PROCEDURE — 56605 BIOPSY OF VULVA/PERINEUM: CPT | Performed by: OBSTETRICS & GYNECOLOGY

## 2024-11-12 PROCEDURE — 3050F LDL-C >= 130 MG/DL: CPT | Performed by: OBSTETRICS & GYNECOLOGY

## 2024-11-12 PROCEDURE — 3074F SYST BP LT 130 MM HG: CPT | Performed by: OBSTETRICS & GYNECOLOGY

## 2024-11-12 NOTE — PROGRESS NOTES
Patient ID: Mary See is a 74 y.o. female with vulvitis, consistent with lichen sclerosus.  Left inner labia with thickened hypopigmented change.  Proceed with punch biopsy.    Biopsy vulva    Date/Time: 11/12/2024 5:05 PM    Performed by: Mely Swift MD  Authorized by: Mely Swift MD    Procedure Overview:     Procedure type: punch biopsy of skin      # of biopsies taken:  1  Consent:     Consent obtained:  Verbal    Consent given by:  Patient    Risks & benefits discussed with patient: Yes    Indication:     Indications: lesion and pigmentation change    Pre-procedure Details:     Premeds:  None    Prepped with: betadine      Local anesthetic:  Lidocaine 1% w/o epi    Total amount used (mL):  4  Procedure Details:     Location 1: labia minora (L)      Size (mm):  5    Hemostasis: pressure and silver nitrate    Post-procedure Details:     Patient tolerance of procedure:  Tolerated well, no immediate complications    Specimen sent to pathology: Yes    Findings:     Impression: lichen sclerosus    Comments:      74-year-old female with vulvitis.  Using estradiol cream 3 times weekly, clobetasol twice weekly.  Aquaphor on weekends.  Although symptoms have improved, there is still a thickened hypopigmented area on the left inner labia minora, near the clitoris.  5 mm punch biopsy sent, hemostasis with pressure and silver nitrate.  Recommend topical Neosporin ointment for about 24 to 48 hours, then Aquaphor before resume estradiol cream next week.  Follow-up in 1 or 2 weeks.

## 2024-11-19 NOTE — RESULT ENCOUNTER NOTE
The result of the biopsy was reassuring, it does confirm lichen sclerosis.  I recommend continuing to apply the clobetasol ointment.  If the area is still irritated, you may want to increase using the ointment 3 times a week till the symptoms resolve, then maintenance is typically twice weekly.

## 2024-11-20 ENCOUNTER — APPOINTMENT (OUTPATIENT)
Dept: PRIMARY CARE | Facility: CLINIC | Age: 74
End: 2024-11-20
Payer: MEDICARE

## 2024-11-20 VITALS
WEIGHT: 229 LBS | DIASTOLIC BLOOD PRESSURE: 76 MMHG | SYSTOLIC BLOOD PRESSURE: 127 MMHG | HEART RATE: 77 BPM | TEMPERATURE: 97.5 F | BODY MASS INDEX: 40.57 KG/M2

## 2024-11-20 DIAGNOSIS — E66.01 MORBID (SEVERE) OBESITY DUE TO EXCESS CALORIES (MULTI): Primary | ICD-10-CM

## 2024-11-20 DIAGNOSIS — Z23 NEED FOR INFLUENZA VACCINATION: ICD-10-CM

## 2024-11-20 PROCEDURE — 4010F ACE/ARB THERAPY RXD/TAKEN: CPT | Performed by: INTERNAL MEDICINE

## 2024-11-20 PROCEDURE — 99214 OFFICE O/P EST MOD 30 MIN: CPT | Performed by: INTERNAL MEDICINE

## 2024-11-20 PROCEDURE — 3044F HG A1C LEVEL LT 7.0%: CPT | Performed by: INTERNAL MEDICINE

## 2024-11-20 PROCEDURE — G0008 ADMIN INFLUENZA VIRUS VAC: HCPCS | Performed by: INTERNAL MEDICINE

## 2024-11-20 PROCEDURE — 3050F LDL-C >= 130 MG/DL: CPT | Performed by: INTERNAL MEDICINE

## 2024-11-20 PROCEDURE — 3078F DIAST BP <80 MM HG: CPT | Performed by: INTERNAL MEDICINE

## 2024-11-20 PROCEDURE — 90662 IIV NO PRSV INCREASED AG IM: CPT | Performed by: INTERNAL MEDICINE

## 2024-11-20 PROCEDURE — 1126F AMNT PAIN NOTED NONE PRSNT: CPT | Performed by: INTERNAL MEDICINE

## 2024-11-20 PROCEDURE — 3074F SYST BP LT 130 MM HG: CPT | Performed by: INTERNAL MEDICINE

## 2024-11-20 RX ORDER — TIRZEPATIDE 2.5 MG/.5ML
2.5 INJECTION, SOLUTION SUBCUTANEOUS WEEKLY
Qty: 2 ML | Refills: 0 | Status: SHIPPED | COMMUNITY
Start: 2024-11-20

## 2024-11-20 ASSESSMENT — PAIN SCALES - GENERAL: PAINLEVEL_OUTOF10: 0-NO PAIN

## 2024-11-20 NOTE — PROGRESS NOTES
Subjective   Patient ID: Mary See is a 74 y.o. female who presents for Follow-up.  Mary has been tolerating Ozempic at 0.25mg with manageable SE. Some increased gas. Weight loss has not continued. She does have looser stools. No constipation at this dose which was the limiting factor at prior dosage of 0.5mg subcutaneous weekly.      Current Outpatient Medications   Medication Instructions    acetaminophen (TYLENOL EXTRA STRENGTH) 1,000 mg, oral, Every 4 hours PRN    albuterol 90 mcg/actuation inhaler USE 2 INHALATIONS BY MOUTH  EVERY 6 HOURS AS NEEDED FOR SHORTNESS OF BREATH    albuterol 2.5 mg, nebulization, Every 8 hours PRN    ALPRAZolam XR (XANAX XR) 0.5 mg, oral, Daily    ascorbic acid (VITAMIN C ORAL) 1 tablet, oral, Daily    calcium carbonate-vitamin D3 600 mg-20 mcg (800 unit) tablet 1 tablet, oral, 2 times daily    carboxymethylcellulose-glycern 0.5-0.9 % drops Use 1-2 Drops in both eyes as needed.    cetirizine (ZyrTEC) 10 mg tablet 1 tablet, oral, Daily    cholecalciferol (VITAMIN D-3) 125 mcg, oral, Daily    citalopram (CELEXA) 40 mg, oral, Daily    clobetasol (Temovate) 0.05 % cream APPLY TO AFFECTED AREA(S) TOPICALLY ONCE DAILY FOR 1 WEEK, THEN TWICE WEEKLY FOR MAINTENANCE.    clobetasol (Temovate) 0.05 % ointment Topical, 2 times weekly    diclofenac sodium (VOLTAREN ARTHRITIS PAIN) 4 g, Topical, 2 times daily PRN    docusate sodium (COLACE) 100 mg, oral, 2 times daily PRN    estradiol (ESTRACE) 0.5 g, vaginal, 3 times weekly, Use a pea-sized amount at the vaginal opening at night 3 times weekly.    fluticasone (Flonase) 50 mcg/actuation nasal spray 2 sprays, Each Nostril, Daily    furosemide (LASIX) 20 mg, oral, Daily PRN    gabapentin (NEURONTIN) 300 mg, oral, 2 times daily    glucosamine-chondroit-vit C-Mn (Glucosamine Chondroitin MaxStr) 500-400 mg capsule 1,500 mg, oral, Daily    guaiFENesin (Mucinex) 1,200 mg tablet extended release 12hr 1 tablet, oral, Every 12 hours, FOR CONGESTION     ketoconazole (NIZOral) 2 % cream Topical, 2 times daily    krill/om3/dha/epa/om6/lip/astx (KRILL OIL, OMEGA 3 AND 6, ORAL) 1 capsule, oral, Daily    levothyroxine (SYNTHROID, LEVOXYL) 150 mcg, oral, Daily    losartan (COZAAR) 50 mg, oral, Daily    magnesium oxide-Mg AA chelate (Magnesium, oxide/AA chelate,) 300 mg capsule 1 capsule, oral, Daily    melatonin 3 mg tablet Take 10 mg by mouth once daily at bedtime.    montelukast (SINGULAIR) 10 mg, oral, Daily, as directed    Mounjaro 2.5 mg, subcutaneous, Weekly    multivitamin with minerals (multivit-min-iron fum-folic ac) tablet 1 tablet, oral, Daily    nystatin (Mycostatin) 100,000 unit/gram powder Topical, Daily    omeprazole (PRILOSEC) 40 mg, oral, 2 times daily    semaglutide 0.25 mg, subcutaneous, Weekly    simethicone (MYLICON) 80 mg, oral, Every 6 hours PRN    triamterene-hydrochlorothiazid (Maxzide-25) 37.5-25 mg tablet 1 tablet, oral, Daily    Wixela Inhub 250-50 mcg/dose diskus inhaler 1 puff, inhalation, Every 12 hours    zinc sulfate (Zincate) 220 (50 Zn) MG capsule 1 capsule, oral, Daily     Review of Systems    Objective   /76 (BP Location: Left arm, Patient Position: Sitting, BP Cuff Size: Adult)   Pulse 77   Temp 36.4 °C (97.5 °F) (Temporal)   Wt 104 kg (229 lb)   BMI 40.57 kg/m²   Physical Exam      Assessment/Plan   Assessment & Plan  Morbid (severe) obesity due to excess calories (Multi)  Will try to switch to Mounjaro when Rx runs out. Follow up after 4 weeks of Mounjaro start. Consider ramp of dose.  Orders:    tirzepatide (Mounjaro) 2.5 mg/0.5 mL pen injector; Inject 2.5 mg under the skin 1 (one) time per week.    Need for influenza vaccination

## 2024-11-26 ENCOUNTER — APPOINTMENT (OUTPATIENT)
Dept: OBSTETRICS AND GYNECOLOGY | Facility: CLINIC | Age: 74
End: 2024-11-26
Payer: MEDICARE

## 2024-11-26 VITALS
SYSTOLIC BLOOD PRESSURE: 112 MMHG | WEIGHT: 228 LBS | DIASTOLIC BLOOD PRESSURE: 78 MMHG | HEIGHT: 63 IN | BODY MASS INDEX: 40.4 KG/M2

## 2024-11-26 DIAGNOSIS — N90.4 LICHEN SCLEROSUS OF FEMALE GENITALIA: Primary | ICD-10-CM

## 2024-11-26 DIAGNOSIS — N95.8 GENITOURINARY SYNDROME OF MENOPAUSE: ICD-10-CM

## 2024-11-26 PROCEDURE — 1159F MED LIST DOCD IN RCRD: CPT | Performed by: OBSTETRICS & GYNECOLOGY

## 2024-11-26 PROCEDURE — 3044F HG A1C LEVEL LT 7.0%: CPT | Performed by: OBSTETRICS & GYNECOLOGY

## 2024-11-26 PROCEDURE — 3008F BODY MASS INDEX DOCD: CPT | Performed by: OBSTETRICS & GYNECOLOGY

## 2024-11-26 PROCEDURE — 3074F SYST BP LT 130 MM HG: CPT | Performed by: OBSTETRICS & GYNECOLOGY

## 2024-11-26 PROCEDURE — 1036F TOBACCO NON-USER: CPT | Performed by: OBSTETRICS & GYNECOLOGY

## 2024-11-26 PROCEDURE — G2211 COMPLEX E/M VISIT ADD ON: HCPCS | Performed by: OBSTETRICS & GYNECOLOGY

## 2024-11-26 PROCEDURE — 3078F DIAST BP <80 MM HG: CPT | Performed by: OBSTETRICS & GYNECOLOGY

## 2024-11-26 PROCEDURE — 3050F LDL-C >= 130 MG/DL: CPT | Performed by: OBSTETRICS & GYNECOLOGY

## 2024-11-26 PROCEDURE — 99214 OFFICE O/P EST MOD 30 MIN: CPT | Performed by: OBSTETRICS & GYNECOLOGY

## 2024-11-26 PROCEDURE — 4010F ACE/ARB THERAPY RXD/TAKEN: CPT | Performed by: OBSTETRICS & GYNECOLOGY

## 2024-11-26 RX ORDER — ESTRADIOL 0.1 MG/G
0.5 CREAM VAGINAL 3 TIMES WEEKLY
Qty: 42.5 G | Refills: 2 | Status: SHIPPED | OUTPATIENT
Start: 2024-11-27

## 2024-11-26 NOTE — PROGRESS NOTES
Mary See is a 74 y.o. female who presents with a chief complaint of Follow-up (Follow up)  SUBJECTIVE  She is here for follow-up after a punch biopsy was performed on the left inner labia minora due to abnormal skin changes.  The biopsy showed lichen sclerosis.    We discussed that she is still healing in this area and is cautious about using estradiol cream vaginally and clobetasol ointment in the area.    She had used Neosporin ointment the first few days after the biopsy.    She denies any fevers or chills or drainage.  Slightly sensitive but no significant pain.    Past Medical History:   Diagnosis Date    Abnormal amount of sputum     Mucinex BID    Anemia     Anxiety     Asthma, moderate persistent ([facility]HS-HCC)     controlled on Wixela    Carpal tunnel syndrome, bilateral upper limbs 2020    Severe carpal tunnel syndrome of both wrists    Depression     Fibromyalgia     Gastric ulcer     GERD (gastroesophageal reflux disease)     Hyperlipidemia     Hypertension     Hypothyroidism     IBS (irritable bowel syndrome)     Inadequate sleep hygiene 10/01/2018    Inadequate sleep hygiene    Morbid obesity (Multi)     OA (osteoarthritis)     PTSD (post-traumatic stress disorder)     PVC (premature ventricular contraction)     Remote PVCs, PACs    Scoliosis     Sleep apnea     CPAP setting 10-20, keeps HOB elevated 33 degrees    Spinal stenosis     Submandibular abscess 2021    Vocal cord dysfunction     w/ chronic upper airway cough and reccurent laryngospasm     Past Surgical History:   Procedure Laterality Date    ABSCESS DRAINAGE      submandibular    ANKLE SURGERY Right     CARPAL TUNNEL RELEASE      CATARACT EXTRACTION Bilateral 10/01/2018     SECTION, CLASSIC  1987     Section    COLONOSCOPY  2019    HERNIA REPAIR      Inguinal Hernia Repair    HIP DEBRIDEMENT      s/p replacement    LASIK  2018    ROTATOR CUFF REPAIR Left     TONSILLECTOMY      Tonsillectomy    TOTAL HIP  ARTHROPLASTY Right 2014    TOTAL KNEE ARTHROPLASTY      ,     TOTAL SHOULDER ARTHROPLASTY Right 2022    TRACHEOSTOMY CLOSURE      TRACHEOSTOMY TUBE PLACEMENT  2021     Social History     Socioeconomic History    Marital status:    Tobacco Use    Smoking status: Former     Current packs/day: 0.00     Average packs/day: 1 pack/day for 7.0 years (7.0 ttl pk-yrs)     Types: Cigarettes     Start date:      Quit date:      Years since quittin.9     Passive exposure: Never    Smokeless tobacco: Never   Vaping Use    Vaping status: Never Used   Substance and Sexual Activity    Alcohol use: Not Currently     Comment: Occasionally    Drug use: Never    Sexual activity: Not Currently     Social Drivers of Health      [facility]    [employer] Social Needs Screening - Social Connection     Family History   Problem Relation Name Age of Onset    Cancer Mother          ADRENAL GLAND; DIGESTIVE ORGAN    Other (ANXIETY AND DEPRESSION) Mother      Alcohol abuse Father      Liver cancer Father      Ovarian cancer Sister      Dilated cardiomyopathy Sister          NONISCHEMIC    Breast cancer Father's Sister         OB History    Para Term  AB Living   1 1 1 0 0 1   SAB IAB Ectopic Multiple Live Births   0 0 0 0 1      # Outcome Date GA Lbr Stephan/2nd Weight Sex Type Anes PTL Lv   1 Term                OBJECTIVE  Allergies   Allergen Reactions    Ibuprofen Shortness of breath and Blurred vision     GI UPSET  tachycardia    Gluten Unknown     bloating,pain ,gas and diarrrhea    Adhesive Tape-Silicones Itching     incision glue, redness, blisters    Lactose GI Upset    Miconazole Hives    Soap Rash     Dial soap      (Not in a hospital admission)       Review of Systems  Negative except status post left labial punch biopsy.  Physical Exam  General Appearance: awake, alert, oriented, in no acute distress  A swab was used to better identify the healing progress  "of the left inner labia punch biopsy.  There is no redness, no drainage.  There is still a circular defect about 5 mm around and 2 mm deep.  The area is clean and dry.  /78   Ht 1.6 m (5' 3\")   Wt 103 kg (228 lb)   BMI 40.39 kg/m²    Problem List Items Addressed This Visit    None     This is a 74-year-old female status post a left labia minora punch biopsy that notes lichen sclerosus.  She is healing well although the healing has not been completed.  I recommend minimizing the clobetasol ointment to the area as it could possibly delay healing.  She will continue to use estradiol cream at the vaginal opening 3 times weekly and a refill was ordered to her local pharmacy.  We discussed using Neosporin ointment for the next 3 to 5 days.  She will call me with any concerns.  Current follow-up is about September 2025.    "

## 2024-12-16 DIAGNOSIS — F41.9 ANXIETY: ICD-10-CM

## 2024-12-16 RX ORDER — ALPRAZOLAM 0.5 MG/1
0.5 TABLET, EXTENDED RELEASE ORAL DAILY
Qty: 90 TABLET | Refills: 0 | Status: SHIPPED | OUTPATIENT
Start: 2024-12-16

## 2024-12-19 DIAGNOSIS — F41.9 ANXIETY: ICD-10-CM

## 2024-12-19 RX ORDER — ALPRAZOLAM 0.5 MG/1
0.5 TABLET, EXTENDED RELEASE ORAL DAILY
Qty: 90 TABLET | Refills: 0 | OUTPATIENT
Start: 2024-12-19

## 2024-12-23 DIAGNOSIS — F41.9 ANXIETY: ICD-10-CM

## 2024-12-23 RX ORDER — ALPRAZOLAM 0.5 MG/1
0.5 TABLET, EXTENDED RELEASE ORAL DAILY
Qty: 90 TABLET | Refills: 0 | Status: SHIPPED | OUTPATIENT
Start: 2024-12-23

## 2025-01-30 ENCOUNTER — HOSPITAL ENCOUNTER (OUTPATIENT)
Dept: RADIOLOGY | Facility: HOSPITAL | Age: 75
Discharge: HOME | End: 2025-01-30
Payer: MEDICARE

## 2025-01-30 ENCOUNTER — OFFICE VISIT (OUTPATIENT)
Dept: ORTHOPEDIC SURGERY | Facility: HOSPITAL | Age: 75
End: 2025-01-30
Payer: MEDICARE

## 2025-01-30 DIAGNOSIS — Z09 FOLLOW-UP EXAMINATION AFTER ORTHOPEDIC SURGERY: Primary | ICD-10-CM

## 2025-01-30 DIAGNOSIS — Z96.612 STATUS POST REVERSE TOTAL SHOULDER REPLACEMENT, LEFT: ICD-10-CM

## 2025-01-30 DIAGNOSIS — Z79.2 PROPHYLACTIC ANTIBIOTIC: ICD-10-CM

## 2025-01-30 PROCEDURE — 99213 OFFICE O/P EST LOW 20 MIN: CPT | Performed by: NURSE PRACTITIONER

## 2025-01-30 PROCEDURE — 73030 X-RAY EXAM OF SHOULDER: CPT | Mod: LT

## 2025-01-30 PROCEDURE — 1160F RVW MEDS BY RX/DR IN RCRD: CPT | Performed by: NURSE PRACTITIONER

## 2025-01-30 PROCEDURE — 1125F AMNT PAIN NOTED PAIN PRSNT: CPT | Performed by: NURSE PRACTITIONER

## 2025-01-30 PROCEDURE — 1036F TOBACCO NON-USER: CPT | Performed by: NURSE PRACTITIONER

## 2025-01-30 PROCEDURE — 4010F ACE/ARB THERAPY RXD/TAKEN: CPT | Performed by: NURSE PRACTITIONER

## 2025-01-30 PROCEDURE — 1159F MED LIST DOCD IN RCRD: CPT | Performed by: NURSE PRACTITIONER

## 2025-01-30 RX ORDER — AMOXICILLIN 500 MG/1
2000 TABLET, FILM COATED ORAL ONCE
Qty: 16 TABLET | Refills: 0 | Status: SHIPPED | OUTPATIENT
Start: 2025-01-30 | End: 2025-01-30

## 2025-01-30 ASSESSMENT — PAIN - FUNCTIONAL ASSESSMENT: PAIN_FUNCTIONAL_ASSESSMENT: 0-10

## 2025-01-30 ASSESSMENT — PAIN SCALES - GENERAL: PAINLEVEL_OUTOF10: 1

## 2025-02-01 NOTE — PROGRESS NOTES
Provider Impression/Assessment:  Mary See is a pleasant 74 y.o. female who is returning to clinic for repeat clinical visit for her left shoulder. She is now 6 months status post left reverse total shoulder replacement done on 7/26/24.     Patient Discussion/Plan:  Mary See is doing excellent since left shoulder reverse replacement. Happy to hear that she has no pain with either shoulder now. She will continue to work on strength over the next 6 months. Scapular stabilizing exercises and shrugs daily for bilateral shoulders. We reviewed their are no restrictions at this time with range of motion. Dental prophylaxis antibiotic needed prior to dental procedures in the future. We sent prescription for this today with refills.      We will see patient back in 6 months to repeat clinical check and LEFT shoulder x-rays at that time.      All questions were answered today and they know how to reach our office if needed prior to their next scheduled visit. Patient was discussed with Dr Nix and he agrees with the above plan.     All questions were answered today to their satisfaction and they know how to reach our office if needed prior to the  next scheduled visit with our office.      Should you have any questions or concerns after your visit today, please do not hesitate to call the office at [phone]. 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 She is 2 weeks s/p left reverse replacement. She reports doing well after surgery. She has had no pain since the nerve block wore off. She denies use of narcotic pain  medication at this time or Over-the-counter pain medication for the shoulder. Using ice as needed.  Sleeping in bed without difficulty. Denies redness or warmth at incision site. Denies any fever, chills, or paresthesia. They have been compliant with restrictions. Presents today wearing their shoulder sling. Doing well with daily home therapy for active elbow, wrist and hand exercises. She has not attempted any shoulder exercises at this point. No issues with her incision since the surgical glue was not used this time, due to her reaction with past surgery. She denies any drainage or issues with her incision today. Suture remain in place.      10/29/24  Mary See returns to clinic for their left shoulder. They are 3 months S/P left reverse total shoulder replacement. Repeat left shoulder XR performed today. Mary is doing very well and is very happy with her results so far. 0 out of 10 pain on average. She denies use of any medication at this time for her shoulder. She sleeps well most of the time. Her incision is healing very well. Denies any warmth or erythema. She continues to improve strength and ROM. She is interested in attending formal physical therapy to continue to improve. She continues to use her cane in her right arm without issues. She is interested in staring some formal PT to help her improve her strength now that her range of motion has returned well. No complaints or issues with her right reverse replacement at this time either. She is able to play the guitar without issue for up to 1 hour, which makes her very happy.      1/30/25  Mary See returns to clinic for their left shoulder. They are 6 months S/P left reverse total shoulder replacement. Repeat left shoulder XR performed today. She has no complaints today with either shoulder. She now has both shoulder replaced and feels great. She has resumed guitar playing and doing ADLs without any issues. Her range of motion has improved nicely  over the last 3 months. She continues to work on her strength building. She is sleeping without issues. 0 out of 10 pain on average. Denies use of any pain medication for her shoulders. No new issues or concerns today. No new injury to either shoulder. She is requesting dental antibiotic be sent today.     Review of Systems:   Review of systems for all 14 systems is negative for complaint except for the above noted findings in the history of present illness.     Family, social, and medical histories are obtained and reviewed.     Allergies:  Allergies         Allergies   Allergen Reactions    Ibuprofen Shortness of breath and Blurred vision       GI UPSET  tachycardia    Gluten Unknown       bloating,pain ,gas and diarrrhea    Adhesive Tape-Silicones Itching       incision glue, redness, blisters    Lactose GI Upset    Miconazole Hives    Soap Rash       Dial soap            Medications:    Current Medications      Current Outpatient Medications:     acetaminophen (Tylenol Extra Strength) 500 mg tablet, Take 2 tablets (1,000 mg) by mouth every 4 hours if needed., Disp: , Rfl:     albuterol 2.5 mg /3 mL (0.083 %) nebulizer solution, Take 3 mL (2.5 mg) by nebulization every 8 hours if needed., Disp: , Rfl:     albuterol 90 mcg/actuation inhaler, USE 2 INHALATIONS BY MOUTH  EVERY 6 HOURS AS NEEDED FOR SHORTNESS OF BREATH, Disp: 34 g, Rfl: 3    ALPRAZolam XR (Xanax XR) 0.5 mg 24 hr tablet, Take 1 tablet (0.5 mg) by mouth once daily., Disp: 90 tablet, Rfl: 0    ascorbic acid (VITAMIN C ORAL), Take 1 tablet by mouth once daily., Disp: , Rfl:     calcium carbonate-vitamin D3 600 mg-20 mcg (800 unit) tablet, Take 1 tablet by mouth 2 times a day., Disp: , Rfl:     carboxymethylcellulose-glycern 0.5-0.9 % drops, Use 1-2 Drops in both eyes as needed., Disp: , Rfl:     cetirizine (ZyrTEC) 10 mg tablet, Take 1 tablet (10 mg) by mouth once daily., Disp: , Rfl:     cholecalciferol (Vitamin D-3) 125 MCG (5000 UT) capsule, Take 1  capsule (125 mcg) by mouth once daily., Disp: , Rfl:     citalopram (CeleXA) 40 mg tablet, Take 1 tablet (40 mg) by mouth once daily., Disp: 90 tablet, Rfl: 3    clobetasol (Temovate) 0.05 % cream, APPLY TO AFFECTED AREA(S) TOPICALLY ONCE DAILY FOR 1 WEEK, THEN TWICE WEEKLY FOR MAINTENANCE., Disp: , Rfl:     clobetasol (Temovate) 0.05 % ointment, Apply topically 2 times a week., Disp: 60 g, Rfl: 1    diclofenac sodium (Voltaren Arthritis Pain) 1 % gel, Apply 4.5 inches (4 g) topically 2 times a day as needed., Disp: , Rfl:     docusate sodium (Colace) 100 mg capsule, Take 1 capsule (100 mg) by mouth 2 times a day as needed for constipation., Disp: , Rfl:     estradiol (Estrace) 0.01 % (0.1 mg/gram) vaginal cream, Insert 0.125 Applicatorfuls (0.5 g) into the vagina 3 times a week. Use a pea-sized amount at the vaginal opening at night 3 times weekly., Disp: 42.5 g, Rfl: 2    fluticasone (Flonase) 50 mcg/actuation nasal spray, Administer 2 sprays into each nostril once daily., Disp: 48 g, Rfl: 3    furosemide (Lasix) 20 mg tablet, Take 1 tablet (20 mg) by mouth once daily as needed (edema)., Disp: 90 tablet, Rfl: 3    gabapentin (Neurontin) 300 mg capsule, Take 1 capsule (300 mg) by mouth 2 times a day., Disp: 810 capsule, Rfl: 3    glucosamine-chondroit-vit C-Mn (Glucosamine Chondroitin MaxStr) 500-400 mg capsule, Take 1,500 mg by mouth once daily., Disp: , Rfl:     guaiFENesin (Mucinex) 1,200 mg tablet extended release 12hr, Take 1 tablet (1,200 mg) by mouth every 12 hours. FOR CONGESTION, Disp: , Rfl:     ketoconazole (NIZOral) 2 % cream, Apply topically twice a day., Disp: , Rfl:     krill/om3/dha/epa/om6/lip/astx (KRILL OIL, OMEGA 3 AND 6, ORAL), Take 1 capsule by mouth once daily., Disp: , Rfl:     levothyroxine (Synthroid, Levoxyl) 150 mcg tablet, TAKE 1 TABLET BY MOUTH ONCE  DAILY, Disp: 90 tablet, Rfl: 3    losartan (Cozaar) 50 mg tablet, TAKE 1 TABLET BY MOUTH ONCE  DAILY, Disp: 90 tablet, Rfl: 3     magnesium oxide-Mg AA chelate (Magnesium, oxide/AA chelate,) 300 mg capsule, Take 1 capsule (300 mg) by mouth once daily., Disp: , Rfl:     melatonin 3 mg tablet, Take 10 mg by mouth once daily at bedtime., Disp: , Rfl:     montelukast (Singulair) 10 mg tablet, TAKE 1 TABLET BY MOUTH DAILY AS  DIRECTED, Disp: 90 tablet, Rfl: 3    multivitamin with minerals (multivit-min-iron fum-folic ac) tablet, Take 1 tablet by mouth once daily., Disp: , Rfl:     nystatin (Mycostatin) 100,000 unit/gram powder, Apply topically once daily., Disp: 60 g, Rfl: 2    omeprazole (PriLOSEC) 40 mg DR capsule, TAKE 1 CAPSULE BY MOUTH TWICE  DAILY, Disp: 180 capsule, Rfl: 3    polyethylene glycol (GoLYTELY) 236-22.74-6.74 -5.86 gram solution, Drink 1/2 starting at 6 pm the night before your procedure then drink the 2nd 1/2 5 hours before procedure arrival time, Disp: 4000 mL, Rfl: 0    semaglutide 0.25 mg or 0.5 mg (2 mg/3 mL) pen injector, Inject 0.25 mg under the skin 1 (one) time per week., Disp: 6 mL, Rfl: 1    simethicone (Mylicon) 80 mg chewable tablet, Chew 1 tablet (80 mg) every 6 hours if needed., Disp: , Rfl:     triamterene-hydrochlorothiazid (Maxzide-25) 37.5-25 mg tablet, TAKE 1 TABLET BY MOUTH DAILY, Disp: 90 tablet, Rfl: 3    Wixela Inhub 250-50 mcg/dose diskus inhaler, USE 1 INHALATION BY MOUTH EVERY  12 HOURS, Disp: 180 each, Rfl: 3    zinc sulfate (Zincate) 220 (50 Zn) MG capsule, Take 1 capsule (50 mg of elemental zinc) by mouth once daily., Disp: , Rfl:         Diagnoses/Problems:  Left shoulder arthritis  S/p bilateral shoulder reverse replacements     Physical Examination:  Mary See is a well-developed well-nourished female in no acute distress. Breathes with normal chest rises. Eye exam reveals round pupils. Awake alert and oriented x3.      Examination of right shoulder reveals well healed incision. Range of motion 170° of forward elevation. 60° of external rotation. Internal rotation was to T12.      Examination  of left shoulder reveals skin is intact. Well healed incision. No edema. No ecchymosis. No erythema. Active forward elevation to 160°. External rotation to 60°. Internally rotates to L3. No pain over acromioclavicular joint. Neurovascular intact distally.      Results/Imaging:  Independent review of the imaging today (1/30/25) of left shoulder show excellent alignment status post reverse total shoulder replacement. No evidence of fracture, dislocation of other acute findings. Axillary view today. I personally spent time viewing digital images of the radiology testing with the patient.      *While intending to generate a timely document that accurately reflects the content of the visit, no guarantee can be provided that every grammatical or spelling mistake has been or will be identified or corrected. Thank you for your understanding.

## 2025-02-07 DIAGNOSIS — I10 BENIGN ESSENTIAL HYPERTENSION: ICD-10-CM

## 2025-02-07 DIAGNOSIS — J45.30 MILD PERSISTENT ASTHMA WITHOUT COMPLICATION (HHS-HCC): ICD-10-CM

## 2025-02-10 RX ORDER — MONTELUKAST SODIUM 10 MG/1
10 TABLET ORAL DAILY
Qty: 90 TABLET | Refills: 3 | OUTPATIENT
Start: 2025-02-10

## 2025-02-10 RX ORDER — TRIAMTERENE/HYDROCHLOROTHIAZID 37.5-25 MG
1 TABLET ORAL DAILY
Qty: 90 TABLET | Refills: 3 | OUTPATIENT
Start: 2025-02-10

## 2025-02-23 DIAGNOSIS — R21 SKIN RASH: ICD-10-CM

## 2025-02-23 DIAGNOSIS — J45.30 MILD PERSISTENT ASTHMA WITHOUT COMPLICATION (HHS-HCC): ICD-10-CM

## 2025-02-23 DIAGNOSIS — I10 BENIGN ESSENTIAL HYPERTENSION: ICD-10-CM

## 2025-02-23 DIAGNOSIS — J45.909 ASTHMA, UNSPECIFIED ASTHMA SEVERITY, UNSPECIFIED WHETHER COMPLICATED, UNSPECIFIED WHETHER PERSISTENT (HHS-HCC): ICD-10-CM

## 2025-02-24 RX ORDER — NYSTATIN 100000 [USP'U]/G
POWDER TOPICAL DAILY
Qty: 180 G | Refills: 2 | Status: SHIPPED | OUTPATIENT
Start: 2025-02-24

## 2025-02-25 ENCOUNTER — APPOINTMENT (OUTPATIENT)
Dept: PRIMARY CARE | Facility: CLINIC | Age: 75
End: 2025-02-25
Payer: MEDICARE

## 2025-02-25 VITALS
SYSTOLIC BLOOD PRESSURE: 143 MMHG | BODY MASS INDEX: 42.97 KG/M2 | TEMPERATURE: 96.6 F | WEIGHT: 242.6 LBS | HEART RATE: 71 BPM | DIASTOLIC BLOOD PRESSURE: 77 MMHG

## 2025-02-25 DIAGNOSIS — E66.01 CLASS 3 SEVERE OBESITY DUE TO EXCESS CALORIES WITH SERIOUS COMORBIDITY AND BODY MASS INDEX (BMI) OF 40.0 TO 44.9 IN ADULT: ICD-10-CM

## 2025-02-25 DIAGNOSIS — F41.9 ANXIETY: ICD-10-CM

## 2025-02-25 DIAGNOSIS — E78.00 HYPERCHOLESTEROLEMIA: ICD-10-CM

## 2025-02-25 DIAGNOSIS — F33.42 RECURRENT MAJOR DEPRESSIVE DISORDER, IN FULL REMISSION (CMS-HCC): ICD-10-CM

## 2025-02-25 DIAGNOSIS — E03.9 HYPOTHYROIDISM, UNSPECIFIED TYPE: ICD-10-CM

## 2025-02-25 DIAGNOSIS — J45.30 MILD PERSISTENT ASTHMA WITHOUT COMPLICATION (HHS-HCC): ICD-10-CM

## 2025-02-25 DIAGNOSIS — I10 BENIGN ESSENTIAL HYPERTENSION: ICD-10-CM

## 2025-02-25 DIAGNOSIS — Z96.653 ARTIFICIAL KNEE JOINT PRESENT, BILATERAL: ICD-10-CM

## 2025-02-25 DIAGNOSIS — Z96.642 PRESENCE OF LEFT ARTIFICIAL HIP JOINT: ICD-10-CM

## 2025-02-25 DIAGNOSIS — G47.33 OBSTRUCTIVE SLEEP APNEA (ADULT) (PEDIATRIC): ICD-10-CM

## 2025-02-25 DIAGNOSIS — J45.909 ASTHMA, UNSPECIFIED ASTHMA SEVERITY, UNSPECIFIED WHETHER COMPLICATED, UNSPECIFIED WHETHER PERSISTENT (HHS-HCC): ICD-10-CM

## 2025-02-25 DIAGNOSIS — R45.89 DEPRESSED MOOD: ICD-10-CM

## 2025-02-25 DIAGNOSIS — M79.7 FIBROMYALGIA: ICD-10-CM

## 2025-02-25 DIAGNOSIS — E66.813 CLASS 3 SEVERE OBESITY DUE TO EXCESS CALORIES WITH SERIOUS COMORBIDITY AND BODY MASS INDEX (BMI) OF 40.0 TO 44.9 IN ADULT: ICD-10-CM

## 2025-02-25 DIAGNOSIS — Z00.00 MEDICARE ANNUAL WELLNESS VISIT, SUBSEQUENT: Primary | ICD-10-CM

## 2025-02-25 PROBLEM — N95.2 VAGINAL ATROPHY: Status: RESOLVED | Noted: 2023-03-16 | Resolved: 2025-02-25

## 2025-02-25 PROBLEM — F32.5 MAJOR DEPRESSIVE DISORDER, SINGLE EPISODE IN FULL REMISSION (CMS-HCC): Status: RESOLVED | Noted: 2022-05-02 | Resolved: 2025-02-25

## 2025-02-25 PROBLEM — M17.10 ARTHRITIS OF KNEE: Status: RESOLVED | Noted: 2023-03-16 | Resolved: 2025-02-25

## 2025-02-25 PROBLEM — D22.39 MELANOCYTIC NEVI OF OTHER PARTS OF FACE: Status: RESOLVED | Noted: 2023-01-31 | Resolved: 2025-02-25

## 2025-02-25 PROBLEM — N90.4 LICHEN SCLEROSUS OF FEMALE GENITALIA: Status: RESOLVED | Noted: 2023-03-16 | Resolved: 2025-02-25

## 2025-02-25 PROBLEM — M16.11 OSTEOARTHRITIS OF RIGHT HIP: Status: RESOLVED | Noted: 2023-03-16 | Resolved: 2025-02-25

## 2025-02-25 PROBLEM — M54.12 CERVICAL RADICULAR PAIN: Status: RESOLVED | Noted: 2023-03-16 | Resolved: 2025-02-25

## 2025-02-25 PROBLEM — M51.369 OTHER INTERVERTEBRAL DISC DEGENERATION, LUMBAR REGION: Status: RESOLVED | Noted: 2022-05-02 | Resolved: 2025-02-25

## 2025-02-25 PROBLEM — Z93.0 STATUS POST TRACHEOSTOMY (MULTI): Status: RESOLVED | Noted: 2023-03-16 | Resolved: 2025-02-25

## 2025-02-25 PROBLEM — J06.9 ACUTE URI: Status: RESOLVED | Noted: 2023-03-16 | Resolved: 2025-02-25

## 2025-02-25 PROBLEM — K25.9 GASTRIC ULCER: Status: RESOLVED | Noted: 2023-03-16 | Resolved: 2025-02-25

## 2025-02-25 PROBLEM — M21.969 ANKLE AND FOOT DEFORMITY, ACQUIRED: Status: RESOLVED | Noted: 2023-03-16 | Resolved: 2025-02-25

## 2025-02-25 PROBLEM — J32.9 CHRONIC SINUSITIS: Status: RESOLVED | Noted: 2023-03-16 | Resolved: 2025-02-25

## 2025-02-25 PROBLEM — U07.1 COVID-19 VIRUS INFECTION: Status: RESOLVED | Noted: 2023-03-16 | Resolved: 2025-02-25

## 2025-02-25 PROBLEM — M21.969: Status: RESOLVED | Noted: 2022-05-02 | Resolved: 2025-02-25

## 2025-02-25 PROBLEM — D12.6 ADENOMATOUS POLYP OF COLON: Status: RESOLVED | Noted: 2023-03-16 | Resolved: 2025-02-25

## 2025-02-25 PROBLEM — M19.019 SHOULDER ARTHRITIS: Status: RESOLVED | Noted: 2023-03-16 | Resolved: 2025-02-25

## 2025-02-25 PROBLEM — K58.0 IRRITABLE BOWEL SYNDROME WITH DIARRHEA: Status: RESOLVED | Noted: 2023-03-16 | Resolved: 2025-02-25

## 2025-02-25 PROBLEM — R06.00 DYSPNEA: Status: RESOLVED | Noted: 2023-03-16 | Resolved: 2025-02-25

## 2025-02-25 PROBLEM — M21.40 FLAT FOOT: Status: RESOLVED | Noted: 2024-03-20 | Resolved: 2025-02-25

## 2025-02-25 PROBLEM — M19.072 PRIMARY OSTEOARTHRITIS OF BOTH FEET: Status: RESOLVED | Noted: 2023-12-11 | Resolved: 2025-02-25

## 2025-02-25 PROBLEM — R05.8 UPPER AIRWAY COUGH SYNDROME: Status: RESOLVED | Noted: 2023-03-16 | Resolved: 2025-02-25

## 2025-02-25 PROBLEM — M41.9 SCOLIOSIS: Status: RESOLVED | Noted: 2022-05-02 | Resolved: 2025-02-25

## 2025-02-25 PROBLEM — J31.0 RHINITIS, CHRONIC: Status: RESOLVED | Noted: 2023-03-16 | Resolved: 2025-02-25

## 2025-02-25 PROBLEM — M14.672 CHARCOT'S JOINT OF LEFT FOOT: Status: RESOLVED | Noted: 2024-03-20 | Resolved: 2025-02-25

## 2025-02-25 PROBLEM — M48.061 SPINAL STENOSIS OF LUMBAR REGION: Status: RESOLVED | Noted: 2023-03-16 | Resolved: 2025-02-25

## 2025-02-25 PROBLEM — M51.369 DISC DEGENERATION, LUMBAR: Status: RESOLVED | Noted: 2023-03-16 | Resolved: 2025-02-25

## 2025-02-25 PROBLEM — B35.6 TINEA CRURIS: Status: RESOLVED | Noted: 2023-03-16 | Resolved: 2025-02-25

## 2025-02-25 PROBLEM — J30.2 SEASONAL ALLERGIC RHINITIS: Status: RESOLVED | Noted: 2023-03-16 | Resolved: 2025-02-25

## 2025-02-25 PROBLEM — Z96.611 STATUS POST REPLACEMENT OF RIGHT SHOULDER JOINT: Status: RESOLVED | Noted: 2023-03-16 | Resolved: 2025-02-25

## 2025-02-25 PROBLEM — R53.1 WEAKNESS GENERALIZED: Status: RESOLVED | Noted: 2023-03-16 | Resolved: 2025-02-25

## 2025-02-25 PROBLEM — D18.01 HEMANGIOMA OF SKIN AND SUBCUTANEOUS TISSUE: Status: RESOLVED | Noted: 2023-01-31 | Resolved: 2025-02-25

## 2025-02-25 PROBLEM — R73.09 ABNORMAL GLUCOSE: Status: RESOLVED | Noted: 2023-03-16 | Resolved: 2025-02-25

## 2025-02-25 PROBLEM — R05.3 COUGH, PERSISTENT: Status: RESOLVED | Noted: 2023-03-16 | Resolved: 2025-02-25

## 2025-02-25 PROBLEM — Z79.82 LONG TERM (CURRENT) USE OF ASPIRIN: Status: RESOLVED | Noted: 2022-05-02 | Resolved: 2025-02-25

## 2025-02-25 PROBLEM — L57.0 ACTINIC KERATOSIS: Status: RESOLVED | Noted: 2023-01-31 | Resolved: 2025-02-25

## 2025-02-25 PROBLEM — D48.5 NEOPLASM OF UNCERTAIN BEHAVIOR OF SKIN: Status: RESOLVED | Noted: 2023-01-31 | Resolved: 2025-02-25

## 2025-02-25 PROBLEM — R19.7 DIARRHEA: Status: RESOLVED | Noted: 2023-03-16 | Resolved: 2025-02-25

## 2025-02-25 PROBLEM — M47.812 SPONDYLOSIS OF CERVICAL REGION WITHOUT MYELOPATHY OR RADICULOPATHY: Status: RESOLVED | Noted: 2023-03-16 | Resolved: 2025-02-25

## 2025-02-25 PROBLEM — E11.9 TYPE 2 DIABETES MELLITUS WITHOUT COMPLICATIONS (MULTI): Status: RESOLVED | Noted: 2022-05-02 | Resolved: 2025-02-25

## 2025-02-25 PROBLEM — M25.519 SHOULDER PAIN: Status: RESOLVED | Noted: 2023-03-16 | Resolved: 2025-02-25

## 2025-02-25 PROBLEM — M47.816 SPONDYLOSIS WITHOUT MYELOPATHY OR RADICULOPATHY, LUMBAR REGION: Status: RESOLVED | Noted: 2022-05-02 | Resolved: 2025-02-25

## 2025-02-25 PROBLEM — M17.11 LOCALIZED PRIMARY OSTEOARTHRITIS OF LOWER LEG, RIGHT: Status: RESOLVED | Noted: 2023-03-16 | Resolved: 2025-02-25

## 2025-02-25 PROBLEM — D22.5 MELANOCYTIC NEVI OF TRUNK: Status: RESOLVED | Noted: 2023-01-31 | Resolved: 2025-02-25

## 2025-02-25 PROBLEM — R26.89 IMBALANCE: Status: RESOLVED | Noted: 2023-10-11 | Resolved: 2025-02-25

## 2025-02-25 PROBLEM — J45.40 MODERATE PERSISTENT ASTHMA WITHOUT COMPLICATION (HHS-HCC): Status: RESOLVED | Noted: 2023-03-16 | Resolved: 2025-02-25

## 2025-02-25 PROBLEM — H52.201 ASTIGMATISM OF RIGHT EYE: Status: RESOLVED | Noted: 2017-08-24 | Resolved: 2025-02-25

## 2025-02-25 PROBLEM — M47.22 OTHER SPONDYLOSIS WITH RADICULOPATHY, CERVICAL REGION: Status: RESOLVED | Noted: 2022-05-02 | Resolved: 2025-02-25

## 2025-02-25 PROBLEM — M25.569 JOINT PAIN, KNEE: Status: RESOLVED | Noted: 2023-03-16 | Resolved: 2025-02-25

## 2025-02-25 PROBLEM — T78.3XXA ANGIOEDEMA: Status: RESOLVED | Noted: 2023-03-16 | Resolved: 2025-02-25

## 2025-02-25 PROBLEM — M54.2 NECK PAIN: Status: RESOLVED | Noted: 2023-03-16 | Resolved: 2025-02-25

## 2025-02-25 PROBLEM — E73.9 LACTOSE INTOLERANCE: Status: RESOLVED | Noted: 2023-03-16 | Resolved: 2025-02-25

## 2025-02-25 PROBLEM — R49.8 VOCAL FATIGUE: Status: RESOLVED | Noted: 2023-03-16 | Resolved: 2025-02-25

## 2025-02-25 PROBLEM — R20.2 HAND PARESTHESIA: Status: RESOLVED | Noted: 2023-03-16 | Resolved: 2025-02-25

## 2025-02-25 PROBLEM — L82.0 INFLAMED SEBORRHEIC KERATOSIS: Status: RESOLVED | Noted: 2023-01-31 | Resolved: 2025-02-25

## 2025-02-25 PROBLEM — L57.9 SKIN CHANGES DUE TO CHRONIC EXPOSURE TO NONIONIZING RADIATION, UNSPECIFIED: Status: RESOLVED | Noted: 2023-01-31 | Resolved: 2025-02-25

## 2025-02-25 PROBLEM — M25.559 ARTHRALGIA OF HIP: Status: RESOLVED | Noted: 2023-03-16 | Resolved: 2025-02-25

## 2025-02-25 PROBLEM — D22.60 MELANOCYTIC NEVI OF UNSPECIFIED UPPER LIMB, INCLUDING SHOULDER: Status: RESOLVED | Noted: 2023-01-31 | Resolved: 2025-02-25
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
## 2025-02-25 PROBLEM — G56.00 SEVERE CARPAL TUNNEL SYNDROME: Status: RESOLVED | Noted: 2023-03-16 | Resolved: 2025-02-25

## 2025-02-25 PROBLEM — Z87.891 PERSONAL HISTORY OF NICOTINE DEPENDENCE: Status: RESOLVED | Noted: 2022-05-02 | Resolved: 2025-02-25

## 2025-02-25 PROBLEM — F43.10 PTSD (POST-TRAUMATIC STRESS DISORDER): Status: RESOLVED | Noted: 2022-05-02 | Resolved: 2025-02-25

## 2025-02-25 PROBLEM — M14.671 CHARCOT'S JOINT OF RIGHT FOOT: Status: RESOLVED | Noted: 2024-03-20 | Resolved: 2025-02-25

## 2025-02-25 PROBLEM — K21.9 GERD (GASTROESOPHAGEAL REFLUX DISEASE): Status: RESOLVED | Noted: 2023-03-16 | Resolved: 2025-02-25

## 2025-02-25 PROBLEM — R07.9 CHEST PAIN: Status: RESOLVED | Noted: 2023-03-16 | Resolved: 2025-02-25

## 2025-02-25 PROBLEM — R29.898 WEAKNESS OF BOTH HIPS: Status: RESOLVED | Noted: 2023-03-16 | Resolved: 2025-02-25

## 2025-02-25 PROBLEM — J38.3 VOCAL CORD DYSFUNCTION: Status: RESOLVED | Noted: 2023-03-16 | Resolved: 2025-02-25

## 2025-02-25 PROBLEM — M16.12 ARTHRITIS OF LEFT HIP: Status: RESOLVED | Noted: 2023-03-16 | Resolved: 2025-02-25

## 2025-02-25 PROBLEM — D22.4 MELANOCYTIC NEVI OF SCALP AND NECK: Status: RESOLVED | Noted: 2023-01-31 | Resolved: 2025-02-25

## 2025-02-25 PROBLEM — D22.70 MELANOCYTIC NEVI OF UNSPECIFIED LOWER LIMB, INCLUDING HIP: Status: RESOLVED | Noted: 2023-01-31 | Resolved: 2025-02-25

## 2025-02-25 PROBLEM — R13.10 DYSPHAGIA: Status: RESOLVED | Noted: 2023-03-16 | Resolved: 2025-02-25

## 2025-02-25 PROBLEM — R29.898 WEAKNESS OF BOTH LOWER EXTREMITIES: Status: RESOLVED | Noted: 2024-03-20 | Resolved: 2025-02-25

## 2025-02-25 PROBLEM — M48.00 SPINAL STENOSIS: Status: RESOLVED | Noted: 2023-03-16 | Resolved: 2025-02-25

## 2025-02-25 PROBLEM — N90.89 VULVAR LESION: Status: RESOLVED | Noted: 2023-03-16 | Resolved: 2025-02-25

## 2025-02-25 PROBLEM — L02.01 ABSCESS OF SUBMENTAL REGION: Status: RESOLVED | Noted: 2023-03-16 | Resolved: 2025-02-25

## 2025-02-25 PROBLEM — M70.61 GREATER TROCHANTERIC BURSITIS OF RIGHT HIP: Status: RESOLVED | Noted: 2023-03-16 | Resolved: 2025-02-25

## 2025-02-25 PROBLEM — M19.071 PRIMARY OSTEOARTHRITIS OF BOTH FEET: Status: RESOLVED | Noted: 2023-12-11 | Resolved: 2025-02-25

## 2025-02-25 PROBLEM — J95.04 TRACHEOCUTANEOUS FISTULA FOLLOWING TRACHEOSTOMY (MULTI): Status: RESOLVED | Noted: 2023-03-16 | Resolved: 2025-02-25

## 2025-02-25 PROBLEM — H52.202 ASTIGMATISM, LEFT: Status: RESOLVED | Noted: 2017-08-24 | Resolved: 2025-02-25

## 2025-02-25 PROBLEM — L30.4 ERYTHEMA INTERTRIGO: Status: RESOLVED | Noted: 2023-01-31 | Resolved: 2025-02-25

## 2025-02-25 PROBLEM — S43.429A ROTATOR CUFF (CAPSULE) SPRAIN: Status: RESOLVED | Noted: 2023-03-16 | Resolved: 2025-02-25

## 2025-02-25 PROBLEM — R06.1 STRIDOR: Status: RESOLVED | Noted: 2023-03-16 | Resolved: 2025-02-25

## 2025-02-25 PROBLEM — R09.82 POST-NASAL DRIP: Status: RESOLVED | Noted: 2023-03-16 | Resolved: 2025-02-25

## 2025-02-25 PROCEDURE — 1125F AMNT PAIN NOTED PAIN PRSNT: CPT | Performed by: STUDENT IN AN ORGANIZED HEALTH CARE EDUCATION/TRAINING PROGRAM

## 2025-02-25 PROCEDURE — G0439 PPPS, SUBSEQ VISIT: HCPCS | Performed by: STUDENT IN AN ORGANIZED HEALTH CARE EDUCATION/TRAINING PROGRAM

## 2025-02-25 PROCEDURE — 1158F ADVNC CARE PLAN TLK DOCD: CPT | Performed by: STUDENT IN AN ORGANIZED HEALTH CARE EDUCATION/TRAINING PROGRAM

## 2025-02-25 PROCEDURE — G2211 COMPLEX E/M VISIT ADD ON: HCPCS | Performed by: STUDENT IN AN ORGANIZED HEALTH CARE EDUCATION/TRAINING PROGRAM

## 2025-02-25 PROCEDURE — 1160F RVW MEDS BY RX/DR IN RCRD: CPT | Performed by: STUDENT IN AN ORGANIZED HEALTH CARE EDUCATION/TRAINING PROGRAM

## 2025-02-25 PROCEDURE — 3078F DIAST BP <80 MM HG: CPT | Performed by: STUDENT IN AN ORGANIZED HEALTH CARE EDUCATION/TRAINING PROGRAM

## 2025-02-25 PROCEDURE — 1036F TOBACCO NON-USER: CPT | Performed by: STUDENT IN AN ORGANIZED HEALTH CARE EDUCATION/TRAINING PROGRAM

## 2025-02-25 PROCEDURE — 3077F SYST BP >= 140 MM HG: CPT | Performed by: STUDENT IN AN ORGANIZED HEALTH CARE EDUCATION/TRAINING PROGRAM

## 2025-02-25 PROCEDURE — 1170F FXNL STATUS ASSESSED: CPT | Performed by: STUDENT IN AN ORGANIZED HEALTH CARE EDUCATION/TRAINING PROGRAM

## 2025-02-25 PROCEDURE — 1123F ACP DISCUSS/DSCN MKR DOCD: CPT | Performed by: STUDENT IN AN ORGANIZED HEALTH CARE EDUCATION/TRAINING PROGRAM

## 2025-02-25 PROCEDURE — 1159F MED LIST DOCD IN RCRD: CPT | Performed by: STUDENT IN AN ORGANIZED HEALTH CARE EDUCATION/TRAINING PROGRAM

## 2025-02-25 PROCEDURE — 99215 OFFICE O/P EST HI 40 MIN: CPT | Performed by: STUDENT IN AN ORGANIZED HEALTH CARE EDUCATION/TRAINING PROGRAM

## 2025-02-25 RX ORDER — ALBUTEROL SULFATE 90 UG/1
2 INHALANT RESPIRATORY (INHALATION) EVERY 6 HOURS PRN
Qty: 34 G | Refills: 3 | Status: SHIPPED | OUTPATIENT
Start: 2025-02-25

## 2025-02-25 RX ORDER — CITALOPRAM 40 MG/1
40 TABLET, FILM COATED ORAL DAILY
Qty: 90 TABLET | Refills: 3 | Status: SHIPPED | OUTPATIENT
Start: 2025-02-25 | End: 2026-02-25

## 2025-02-25 RX ORDER — ALPRAZOLAM 0.5 MG/1
0.5 TABLET, EXTENDED RELEASE ORAL DAILY
Qty: 90 TABLET | Refills: 0 | Status: SHIPPED | OUTPATIENT
Start: 2025-02-25

## 2025-02-25 RX ORDER — TRIAMTERENE/HYDROCHLOROTHIAZID 37.5-25 MG
1 TABLET ORAL DAILY
Qty: 90 TABLET | Refills: 3 | Status: SHIPPED | OUTPATIENT
Start: 2025-02-25

## 2025-02-25 RX ORDER — MONTELUKAST SODIUM 10 MG/1
10 TABLET ORAL DAILY
Qty: 90 TABLET | Refills: 3 | Status: SHIPPED | OUTPATIENT
Start: 2025-02-25

## 2025-02-25 RX ORDER — LOSARTAN POTASSIUM 50 MG/1
50 TABLET ORAL DAILY
Qty: 90 TABLET | Refills: 3 | Status: SHIPPED | OUTPATIENT
Start: 2025-02-25

## 2025-02-25 ASSESSMENT — ACTIVITIES OF DAILY LIVING (ADL)
ADLS_COMMENTS: WALKS WITH CANE
DRESSING: INDEPENDENT
DOING_HOUSEWORK: INDEPENDENT
MANAGING_FINANCES: INDEPENDENT
BATHING: INDEPENDENT
TAKING_MEDICATION: INDEPENDENT
GROCERY_SHOPPING: NEEDS ASSISTANCE

## 2025-02-25 ASSESSMENT — ENCOUNTER SYMPTOMS
LOSS OF SENSATION IN FEET: 1
OCCASIONAL FEELINGS OF UNSTEADINESS: 1
DEPRESSION: 0

## 2025-02-25 ASSESSMENT — PATIENT HEALTH QUESTIONNAIRE - PHQ9
SUM OF ALL RESPONSES TO PHQ9 QUESTIONS 1 AND 2: 0
1. LITTLE INTEREST OR PLEASURE IN DOING THINGS: NOT AT ALL
2. FEELING DOWN, DEPRESSED OR HOPELESS: NOT AT ALL

## 2025-02-25 ASSESSMENT — PAIN SCALES - GENERAL: PAINLEVEL_OUTOF10: 4

## 2025-02-25 NOTE — PATIENT INSTRUCTIONS
Your blood work is up to date; no need for additional draw at this appointment    I have renewed your chronic medications today.     We signed a controlled substance agreement today.     I recommend that you lose weight via lifestyle modifications such as diet and exercise.     See me in 6 months.

## 2025-02-25 NOTE — PROGRESS NOTES
Subjective   Reason for Visit: Mary See is an 75 y.o. female here for a Medicare Wellness visit.     Past Medical, Surgical, and Family History reviewed and updated in chart.    Reviewed all medications by prescribing practitioner or clinical pharmacist (such as prescriptions, OTCs, herbal therapies and supplements) and documented in the medical record.    HPI  Patient transitioning care from Dr. Skip Sal who has since retired. Please refer to his excellent notes for full details.     MWV completed today    First appointment with me. Needs refills on all her medications.    Re: MSK - Had 6 joint replacements in her lifetime, most recent 7/24 was her shoulder.    Re: obesity - BMI > 40. On GLP-1 therapy. See [facility] notes.     Re: CV - BP controlled on multidrug regmien. Reports no sx high or low from HTN; denies blurry vision, HA, dizziness LoC CP SoB [place] and leg swelling     Re: Hypothyroid - longstanding diagnosis. Takes synthroid for this. Asx.    Re: MALIHA - on CPAP. Doing well.     Re: Depression/Anx - longstanding diagnosis. On SSRI and daily ER Xanax.     Given the anticipated/current nature of controlled substance use (stimulant, benzo, etc...) the appropriate controlled substance agreement was signed by myself and the patient, most recently on this date: 02/25/25     Re: HM - Mamm current. CRS current. In office shots UTD.     PMHx, FHx, Social Hx, Surg Hx personally reviewed at this appointment. No pertinent findings and/or changes from prior (if applicable).    ROS: Denies wt gain/loss f/c HA LoC CP SOB NVDC. See HPI above, and scanned sheet (if applicable). All other systems are reviewed and are without complaint.      Patient Care Team:  Roscoe Sal MD as PCP - General (Internal Medicine)  Skip Sal MD as PCP [facility] Attributed Provider     Review of Systems    Objective   Vitals:  /77 (BP Location: Left arm, Patient Position: Sitting, BP Cuff Size: Adult)   Pulse 71   Temp  35.9 °C (96.6 °F) (Temporal)   Wt 110 kg (242 lb 9.6 oz)   BMI 42.97 kg/m²       Physical Exam  Gen: morbidly obese, NAD. AAO x3.  HEENT: NC/AT. Anicteric sclera, symmetric pupils. MMM no thrush.  Neck: Soft, supple. No LAD. No goiter.  CV: RRR nl s1s2 no m/r/g  Pulm: CTAB no w/r/r, good air exchange  GI: obese, soft NTND BS+ no hsm  Ext: WWP no edema  Neuro: II-XII grossly intact, nonfocal systemic findings  MSK: 5/5 strength b/l UE and LE  Gait: unremarkable     Lab Results   Component Value Date    WBC 6.3 07/18/2024    HGB 11.9 (L) 07/18/2024    HCT 36.5 07/18/2024     07/18/2024    CHOL 278 (H) 05/28/2024    TRIG 205 (H) 05/28/2024    HDL 67.6 05/28/2024    ALT 15 05/28/2024    AST 20 05/28/2024     07/18/2024    K 3.3 (L) 07/18/2024    CL 98 07/18/2024    CREATININE 0.90 07/18/2024    BUN 19 07/18/2024    CO2 29 07/18/2024    TSH 1.30 05/28/2024    INR 1.0 04/02/2018    HGBA1C 4.9 07/18/2024     par    XR shoulder left 2+ views  Narrative: Interpreted By:  Christopher Jaquez,   STUDY:  XR SHOULDER LEFT 2+ VIEWS; ;  1/30/2025 10:48 am      INDICATION:  Signs/Symptoms:sp.      ,Z96.612 Presence of left artificial shoulder joint      COMPARISON:  10/29/2024      ACCESSION NUMBER(S):  [id number]      ORDERING CLINICIAN:  SARAI JACOBSEN      FINDINGS:  Left shoulder, four views      Reverse total shoulder arthroplasty in place. There is no  periprosthetic fracture lucency were there is no dislocation. There  is moderate acromioclavicular joint osteoarthritis      Impression: No hardware failure about the reverse total shoulder arthroplasty          MACRO:  None      Signed by: Christopher Jaquez 1/31/2025 5:44 PM  Dictation workstation:   [id number]      Current Outpatient Medications   Medication Instructions    acetaminophen (TYLENOL EXTRA STRENGTH) 1,000 mg, oral, Every 4 hours PRN    albuterol 90 mcg/actuation inhaler 2 puffs, inhalation, Every 6 hours PRN    albuterol 2.5 mg, nebulization, Every 8  hours PRN    ALPRAZolam XR (XANAX XR) 0.5 mg, oral, Daily    ascorbic acid (VITAMIN C ORAL) 1 tablet, oral, Daily    calcium carbonate-vitamin D3 600 mg-20 mcg (800 unit) tablet 1 tablet, oral, 2 times daily    carboxymethylcellulose-glycern 0.5-0.9 % drops Use 1-2 Drops in both eyes as needed.    cetirizine (ZyrTEC) 10 mg tablet 1 tablet, oral, Daily    cholecalciferol (VITAMIN D-3) 125 mcg, oral, Daily    citalopram (CELEXA) 40 mg, oral, Daily    clobetasol (Temovate) 0.05 % cream APPLY TO AFFECTED AREA(S) TOPICALLY ONCE DAILY FOR 1 WEEK, THEN TWICE WEEKLY FOR MAINTENANCE.    clobetasol (Temovate) 0.05 % ointment Topical, 2 times weekly    diclofenac sodium (VOLTAREN ARTHRITIS PAIN) 4 g, Topical, 2 times daily PRN    estradiol (ESTRACE) 0.5 g, vaginal, 3 times weekly    fluticasone (Flonase) 50 mcg/actuation nasal spray 2 sprays, Each Nostril, Daily    furosemide (LASIX) 20 mg, oral, Daily PRN    gabapentin (NEURONTIN) 300 mg, oral, 2 times daily    glucosamine-chondroit-vit C-Mn (Glucosamine Chondroitin MaxStr) 500-400 mg capsule 1,500 mg, oral, Daily    guaiFENesin (Mucinex) 1,200 mg tablet extended release 12hr 1 tablet, oral, Every 12 hours, FOR CONGESTION    ketoconazole (NIZOral) 2 % cream Topical, 2 times daily    krill/om3/dha/epa/om6/lip/astx (KRILL OIL, OMEGA 3 AND 6, ORAL) 1 capsule, oral, Daily    levothyroxine (SYNTHROID, LEVOXYL) 150 mcg, oral, Daily    losartan (COZAAR) 50 mg, oral, Daily    magnesium oxide-Mg AA chelate (Magnesium, oxide/AA chelate,) 300 mg capsule 1 capsule, oral, Daily    melatonin 3 mg tablet Take 10 mg by mouth once daily at bedtime.    montelukast (SINGULAIR) 10 mg, oral, Daily, as directed    Mounjaro 2.5 mg, subcutaneous, Weekly    multivitamin with minerals (multivit-min-iron fum-folic ac) tablet 1 tablet, oral, Daily    nystatin (Mycostatin) 100,000 unit/gram powder Topical, Daily    omeprazole (PRILOSEC) 40 mg, oral, 2 times daily    semaglutide 0.25 mg, subcutaneous,  Weekly    simethicone (MYLICON) 80 mg, oral, Every 6 hours PRN    triamterene-hydrochlorothiazid (Maxzide-25) 37.5-25 mg tablet 1 tablet, oral, Daily    Wixela Inhub 250-50 mcg/dose diskus inhaler 1 puff, inhalation, Every 12 hours    zinc sulfate (Zincate) 220 (50 Zn) MG capsule 1 capsule, oral, Daily      Assessment/Plan   Doing well all things considered given all of her comorbid conditions.   MWV completed    I have considered the risks abuse, dependence, addiction, and diversion and believe it is clinically appropriate for this patient to be on benzodiazepines for the diagnosis of chronic anxiety and/or insomnia. [place] report pulled. Patient compliant.    Given the anticipated/current nature of controlled substance use (stimulant, benzo, etc...) the appropriate controlled substance agreement was signed by myself and the patient, most recently on this date: 02/25/25      # Anxiety/Dep  - [place] run  - benzo renewed    # Obesity: BMI >40  - [facility] team on board; taking GLP-1/GIP  - routine lab work if not recent  - lifestyle modifications recommended and extensive patient education provided     # HTN: at/near goal  - continue current regimen  - routine lab work if not recent  - continue lifestyle modifications    # hypothyroidism  - TSH with reflex to free T4  - will adjust meds based on lab values    # MALIHA on CPAP  - continue using CPAP     # MSK: arthritis, multiple joint replacements  - follow up ortho    # Depression and/or Anxiety  - continue SSRI     # Health Maintenance  - routine blood work  - Colon Cancer Screening: no longer indicated due to age   - Mammogram: UTD  - DEXA: -  - Immunizations: UTD    60+ mins      Problem List Items Addressed This Visit       Anxiety    Relevant Medications    ALPRAZolam XR (Xanax XR) 0.5 mg 24 hr tablet    Asthma    Relevant Medications    montelukast (Singulair) 10 mg tablet    albuterol 90 mcg/actuation inhaler    Benign essential hypertension    Relevant Medications     losartan (Cozaar) 50 mg tablet    triamterene-hydrochlorothiazid (Maxzide-25) 37.5-25 mg tablet    Depressed mood    Relevant Medications    citalopram (CeleXA) 40 mg tablet

## 2025-02-26 NOTE — PROGRESS NOTES
Subjective     Mary See is a 75 y.o. female who presents for the following: Skin Exam.  She notes a flare of red, irritated patches underneath her abdomen recently.  She denies any new, changing, or concerning skin lesions since her last visit; no bleeding, itching, or burning lesions.      Review of Systems:  No other skin or systemic complaints other than what is documented elsewhere in the note.    The following portions of the chart were reviewed this encounter and updated as appropriate:       Skin Cancer History  No skin cancer on file.    Specialty Problems    None      Past Dermatologic / Past Relevant Medical History:    - history of AKs  - intertrigo  - no history of atypical nevi or skin cancer    Family History:    No family history of melanoma or skin cancer    Social History:    The patient's , Aakash, is a patient in our office and was seen today as well    Allergies:  Ibuprofen, Gluten, Adhesive tape-silicones, Lactose, Miconazole, and Soap    Current Medications / CAM's:    Current Outpatient Medications:     albuterol 2.5 mg /3 mL (0.083 %) nebulizer solution, Take 3 mL (2.5 mg) by nebulization every 8 hours if needed., Disp: , Rfl:     albuterol 90 mcg/actuation inhaler, Inhale 2 puffs every 6 hours if needed for wheezing., Disp: 34 g, Rfl: 3    ALPRAZolam XR (Xanax XR) 0.5 mg 24 hr tablet, Take 1 tablet (0.5 mg) by mouth once daily., Disp: 90 tablet, Rfl: 0    calcium carbonate-vitamin D3 600 mg-20 mcg (800 unit) tablet, Take 1 tablet by mouth 2 times a day., Disp: , Rfl:     cetirizine (ZyrTEC) 10 mg tablet, Take 1 tablet (10 mg) by mouth once daily., Disp: , Rfl:     citalopram (CeleXA) 40 mg tablet, Take 1 tablet (40 mg) by mouth once daily., Disp: 90 tablet, Rfl: 3    clobetasol (Temovate) 0.05 % cream, APPLY TO AFFECTED AREA(S) TOPICALLY ONCE DAILY FOR 1 WEEK, THEN TWICE WEEKLY FOR MAINTENANCE., Disp: , Rfl:     clobetasol (Temovate) 0.05 % ointment, Apply topically 2 times a  week., Disp: 60 g, Rfl: 1    diclofenac sodium (Voltaren Arthritis Pain) 1 % gel, Apply 4.5 inches (4 g) topically 2 times a day as needed., Disp: , Rfl:     estradiol (Estrace) 0.01 % (0.1 mg/gram) vaginal cream, Insert 0.125 Applicatorfuls (0.5 g) into the vagina 3 times a week., Disp: 42.5 g, Rfl: 2    fluticasone (Flonase) 50 mcg/actuation nasal spray, Administer 2 sprays into each nostril once daily., Disp: 48 g, Rfl: 3    furosemide (Lasix) 20 mg tablet, Take 1 tablet (20 mg) by mouth once daily as needed (edema)., Disp: 90 tablet, Rfl: 3    gabapentin (Neurontin) 300 mg capsule, Take 1 capsule (300 mg) by mouth 2 times a day., Disp: 810 capsule, Rfl: 3    glucosamine-chondroit-vit C-Mn (Glucosamine Chondroitin MaxStr) 500-400 mg capsule, Take 1,500 mg by mouth once daily., Disp: , Rfl:     guaiFENesin (Mucinex) 1,200 mg tablet extended release 12hr, Take 1 tablet (1,200 mg) by mouth every 12 hours. FOR CONGESTION, Disp: , Rfl:     ketoconazole (NIZOral) 2 % cream, Apply topically twice a day., Disp: , Rfl:     ketoconazole (NIZOral) 2 % cream, Apply topically 2 times a day for 28 days. To affected areas of skin for 2-3 weeks, then once or twice weekly for maintenance; may repeat treatment as needed for flares, Disp: 60 g, Rfl: 11    krill/om3/dha/epa/om6/lip/astx (KRILL OIL, OMEGA 3 AND 6, ORAL), Take 1 capsule by mouth once daily., Disp: , Rfl:     levothyroxine (Synthroid, Levoxyl) 150 mcg tablet, TAKE 1 TABLET BY MOUTH ONCE  DAILY, Disp: 90 tablet, Rfl: 3    losartan (Cozaar) 50 mg tablet, Take 1 tablet (50 mg) by mouth once daily., Disp: 90 tablet, Rfl: 3    magnesium oxide-Mg AA chelate (Magnesium, oxide/AA chelate,) 300 mg capsule, Take 1 capsule (300 mg) by mouth once daily., Disp: , Rfl:     melatonin 3 mg tablet, Take 10 mg by mouth once daily at bedtime., Disp: , Rfl:     montelukast (Singulair) 10 mg tablet, Take 1 tablet (10 mg) by mouth once daily. as directed, Disp: 90 tablet, Rfl: 3     multivitamin with minerals (multivit-min-iron fum-folic ac) tablet, Take 1 tablet by mouth once daily., Disp: , Rfl:     nystatin (Mycostatin) 100,000 unit/gram powder, APPLY TOPICALLY ONCE DAILY, Disp: 180 g, Rfl: 2    omeprazole (PriLOSEC) 40 mg DR capsule, TAKE 1 CAPSULE BY MOUTH TWICE  DAILY, Disp: 180 capsule, Rfl: 3    semaglutide 0.25 mg or 0.5 mg (2 mg/3 mL) pen injector, Inject 0.25 mg under the skin 1 (one) time per week., Disp: 6 mL, Rfl: 1    simethicone (Mylicon) 80 mg chewable tablet, Chew 1 tablet (80 mg) every 6 hours if needed., Disp: , Rfl:     tirzepatide (Mounjaro) 2.5 mg/0.5 mL pen injector, Inject 2.5 mg under the skin 1 (one) time per week., Disp: 2 mL, Rfl: 0    triamterene-hydrochlorothiazid (Maxzide-25) 37.5-25 mg tablet, Take 1 tablet by mouth once daily., Disp: 90 tablet, Rfl: 3    Wixela Inhub 250-50 mcg/dose diskus inhaler, USE 1 INHALATION BY MOUTH EVERY  12 HOURS, Disp: 180 each, Rfl: 3    zinc sulfate (Zincate) 220 (50 Zn) MG capsule, Take 1 capsule (50 mg of elemental zinc) by mouth once daily., Disp: , Rfl:      Objective   Well appearing patient in no apparent distress; mood and affect are within normal limits.    A full examination was performed including scalp, face, eyes, ears, nose, lips, neck, chest, axillae, abdomen, back, bilateral upper extremities, and bilateral lower extremities. All findings within normal limits unless otherwise noted below.    Assessment/Plan   1. Actinic keratosis (15)  Head - Anterior (Face) (15)  Scattered on the patient's face, mainly her bilateral cheeks, there are multiple erythematous, gritty, scaly macules     Actinic Keratoses -scattered on face.  The pre-cancerous nature of these lesions and treatment options were discussed with the patient today.  At this time, I recommend treatment with liquid nitrogen cryotherapy.  The patient expressed understanding, is in agreement with this plan, and wishes to proceed with cryotherapy today.    Destr of  lesion - Head - Anterior (Face) (15)  Complexity: simple    Destruction method: cryotherapy    Informed consent: discussed and consent obtained    Lesion destroyed using liquid nitrogen: Yes    Cryotherapy cycles:  1  Outcome: patient tolerated procedure well with no complications    Post-procedure details: wound care instructions given      2. Melanocytic nevus of trunk  Scattered on the patient's face, neck, trunk, and extremities, there are several small, round- to oval-shaped, brown-pigmented and pink-colored, symmetric, uniform-appearing macules and dome-shaped papules    Clinically benign- to slightly atypical-appearing nevi - the clinically benign- to slightly atypical-appearing nature of the patient's nevi was discussed with the patient today.  None of the patient's nevi meet threshold for biopsy today.  I emphasized the importance of performing monthly self-skin exams using the ABCDs of monitoring moles, which were reviewed with the patient today and an informational hand-out provided.  I also emphasized the importance of sun avoidance and sun protection with daily sunscreen use.  The patient expressed understanding and is in agreement with this plan.    3. Seborrheic keratosis  Scattered on the patient's face, neck, trunk, and extremities, there are multiple tan- to light brown-colored, hyperkeratotic, stuck-on appearing papules of varying size and shape    Seborrheic Keratoses - the benign nature of these lesions was discussed with the patient today and reassurance provided.  No treatment is medically indicated for these lesions at this time.    4. Hemangioma of skin  Scattered on the patient's face, neck, trunk, and extremities, there are multiple small, round, cherry red- to purplish-colored, symmetric, uniform, vascular-appearing macules and papules    Cherry Angiomas - the benign nature of these vascular lesions was discussed with the patient today and reassurance provided.  No treatment is medically  indicated for these lesions at this time.    5. Intertrigo  Left Inframammary Fold  In the patient's bilateral infra-abdominal folds, there are red, moist, slightly scaly patches located in skin folds.    Intertrigo -flare in bilateral infra-abdominal folds.  The potentially chronic and intermittently flaring nature of this condition, which likely involves colonization with Candidal yeast, and treatment options were discussed extensively with the patient today.  At this time, I recommend topical anti-fungal therapy with Ketoconazole 2% cream, which the patient was instructed to apply twice daily to the affected areas for the next 2-3 weeks, followed by taper to weekends only with use of over-the-counter Zeasorb AF powder once daily for maintenance; the patient may repeat treatment in a similar fashion as needed for future flares.  The risks, benefits, and side effects of these medications were discussed.  The patient expressed understanding and is in agreement with this plan.    ketoconazole (NIZOral) 2 % cream - Left Inframammary Fold  Apply topically 2 times a day for 28 days. To affected areas of skin for 2-3 weeks, then once or twice weekly for maintenance; may repeat treatment as needed for flares    6. History of actinic keratoses  There is evidence of photodamage in sun-exposed areas.    History of actinic keratoses and photodamage.  The signs and symptoms of skin cancer were reviewed and the patient was advised to practice sun protection and sun avoidance, use daily sunscreen, and perform regular self skin exams.  I will have the patient return to our office in 6 to 12 months for routine follow-up and skin exam, and the patient was instructed to call our office should the patient notice any new, changing, symptomatic, or otherwise concerning skin lesions before then.  The patient expressed understanding and is in agreement with this plan.    7. Diffuse photodamage of skin  Photodistributed  Diffuse  photodamage with actinic changes with telangiectasia and mottled pigmentation in sun-exposed areas.    Photodamage.  The signs and symptoms of skin cancer were reviewed and the patient was advised to practice sun protection and sun avoidance, use daily sunscreen, and perform regular self skin exams.  Sun protection was discussed, including avoiding the mid-day sun, wearing a sunscreen with SPF at least 50, and stressing the need for reapplication of sunscreen and applying more than they think they need.

## 2025-02-27 ENCOUNTER — APPOINTMENT (OUTPATIENT)
Dept: DERMATOLOGY | Facility: CLINIC | Age: 75
End: 2025-02-27
Payer: MEDICARE

## 2025-02-27 DIAGNOSIS — L57.0 ACTINIC KERATOSIS: Primary | ICD-10-CM

## 2025-02-27 DIAGNOSIS — L30.4 INTERTRIGO: ICD-10-CM

## 2025-02-27 DIAGNOSIS — D18.01 HEMANGIOMA OF SKIN: ICD-10-CM

## 2025-02-27 DIAGNOSIS — Z87.2 HISTORY OF ACTINIC KERATOSES: ICD-10-CM

## 2025-02-27 DIAGNOSIS — D22.5 MELANOCYTIC NEVUS OF TRUNK: ICD-10-CM

## 2025-02-27 DIAGNOSIS — L57.8 DIFFUSE PHOTODAMAGE OF SKIN: ICD-10-CM

## 2025-02-27 DIAGNOSIS — L82.1 SEBORRHEIC KERATOSIS: ICD-10-CM

## 2025-02-27 PROCEDURE — 17004 DESTROY PREMAL LESIONS 15/>: CPT | Performed by: DERMATOLOGY

## 2025-02-27 PROCEDURE — 99214 OFFICE O/P EST MOD 30 MIN: CPT | Performed by: DERMATOLOGY

## 2025-02-27 PROCEDURE — 1123F ACP DISCUSS/DSCN MKR DOCD: CPT | Performed by: DERMATOLOGY

## 2025-02-27 RX ORDER — KETOCONAZOLE 20 MG/G
CREAM TOPICAL 2 TIMES DAILY
Qty: 60 G | Refills: 11 | Status: SHIPPED | OUTPATIENT
Start: 2025-02-27 | End: 2025-03-27

## 2025-02-27 RX ORDER — TRIAMTERENE/HYDROCHLOROTHIAZID 37.5-25 MG
1 TABLET ORAL DAILY
Qty: 90 TABLET | Refills: 3 | OUTPATIENT
Start: 2025-02-27

## 2025-02-27 RX ORDER — MONTELUKAST SODIUM 10 MG/1
10 TABLET ORAL DAILY
Qty: 90 TABLET | Refills: 3 | OUTPATIENT
Start: 2025-02-27

## 2025-02-27 RX ORDER — ALBUTEROL SULFATE 90 UG/1
INHALANT RESPIRATORY (INHALATION)
Qty: 34 G | Refills: 3 | OUTPATIENT
Start: 2025-02-27

## 2025-02-27 ASSESSMENT — DERMATOLOGY PATIENT ASSESSMENT
DO YOU USE SUNSCREEN: OCCASIONALLY
DO YOU HAVE ANY NEW OR CHANGING LESIONS: NO
ARE YOU ON BIRTH CONTROL: NO
DO YOU HAVE IRREGULAR MENSTRUAL CYCLES: NO
DO YOU USE A TANNING BED: NO
ARE YOU TRYING TO GET PREGNANT: NO

## 2025-02-27 ASSESSMENT — DERMATOLOGY QUALITY OF LIFE (QOL) ASSESSMENT: ARE THERE EXCLUSIONS OR EXCEPTIONS FOR THE QUALITY OF LIFE ASSESSMENT: NO

## 2025-03-27 ENCOUNTER — TELEPHONE (OUTPATIENT)
Dept: PHARMACY | Facility: HOSPITAL | Age: 75
End: 2025-03-27
Payer: MEDICARE

## 2025-03-27 DIAGNOSIS — E11.9 TYPE 2 DIABETES MELLITUS WITHOUT COMPLICATION, UNSPECIFIED WHETHER LONG TERM INSULIN USE: Primary | ICD-10-CM

## 2025-03-27 DIAGNOSIS — E66.01 MORBID (SEVERE) OBESITY DUE TO EXCESS CALORIES (MULTI): ICD-10-CM

## 2025-03-27 RX ORDER — TIRZEPATIDE 2.5 MG/.5ML
2.5 INJECTION, SOLUTION SUBCUTANEOUS WEEKLY
Qty: 2 ML | Refills: 0 | Status: SHIPPED | OUTPATIENT
Start: 2025-03-27

## 2025-03-27 NOTE — TELEPHONE ENCOUNTER
Patient called to reestablish with clinical pharmacy, she was switched by PCP from Ozempic to Mounjaro for tolerability.      She has been on Mounjaro 2.5 mg weekly, tolerating well.  She does endorse mild GI upset and constipation but reports much fewer side effects than when on Ozempic.      She reports she has implemented diet changes and has noticed she has lost a few pounds since starting Mounjaro, also reporting appetite suppression and fewer food cravings.    We will continue this dose for another month given history of significant GI upset with Ozempic dose titration.  Sent prescription to [facility] per patient request.     Will schedule follow up for 3 weeks 4/17/25 220 pm virtual    Adria RudolphD  Clinical Pharmacy Specialist, Primary Care   [phone]

## 2025-04-17 ENCOUNTER — APPOINTMENT (OUTPATIENT)
Dept: PHARMACY | Facility: HOSPITAL | Age: 75
End: 2025-04-17
Payer: MEDICARE

## 2025-04-17 DIAGNOSIS — E66.01 MORBID (SEVERE) OBESITY DUE TO EXCESS CALORIES (MULTI): ICD-10-CM

## 2025-04-17 DIAGNOSIS — E11.9 TYPE 2 DIABETES MELLITUS WITHOUT COMPLICATION, UNSPECIFIED WHETHER LONG TERM INSULIN USE: ICD-10-CM

## 2025-04-17 NOTE — PROGRESS NOTES
Pharmacist Clinic: Weight Management    Mary See was referred to the Clinical Pharmacy Team for weight management.     Referring Provider: Roscoe Sal MD  Problem List Items Addressed This Visit    None  Visit Diagnoses         Type 2 diabetes mellitus without complication, unspecified whether long term insulin use          Morbid (severe) obesity due to excess calories (Multi)                  HISTORY OF PRESENT ILLNESS    Patient reports that she has stopped taking the Mounjaro since our phone call last, she reported intolerable gas, stomach cramping, and headache.  She noticed improvement in side effects after she stopped the Mounjaro and is feeling better and has more energy.  She has started a nutrition plan and has noticed a 17 lb weight loss since she stopped the Mounjaro, states she was only able to have carb heavier foods for easier digestion.       Diet: coffee and water, meat, vegetables, fruit; avoids bread and pasta, intolerant of gluten    Exercise Routine: experiences pain in feet, issues walking.  active around the house.     Weight  220 lb (initial)  Has lost 24 pounds in first 4 weeks  <5 lb weight loss since last pharmacy follow up    PHARMACY ASSESSMENT  Pertinent PMH Review:  - PMH of Pancreatitis: No  - PMH of Retinopathy: No  - PMH of MTC: No    Allergies: Ibuprofen, Gluten, Adhesive tape-silicones, Lactose, Miconazole, and Soap     [facility] [address] [place], [place] - [address] [facility]  [address] [id number]  Phone: [phone] Fax: [phone]    [facility] - [place], [place] - [address]  [address]  [facility] [address]  [facility] [address]  Phone: [phone] Fax: [phone]    Affordability/Accessibility: Ozempic and Mounjaro covered by insurance plan, off label use    DRUG INTERACTIONS  - No significant drug-drug interactions exist that require adjustment to therapy    LAB  Lab Results   Component Value Date     BILITOT 0.5 05/28/2024    CALCIUM 9.0 07/18/2024    CO2 29 07/18/2024    CL 98 07/18/2024    CREATININE 0.90 07/18/2024    GLUCOSE 79 07/18/2024    ALKPHOS 96 05/28/2024    K 3.3 (L) 07/18/2024    PROT 6.6 05/28/2024     07/18/2024    AST 20 05/28/2024    ALT 15 05/28/2024    BUN 19 07/18/2024    ANIONGAP 13 07/18/2024    PHOS 2.3 (L) 05/11/2021     05/08/2021    ALBUMIN 4.2 05/28/2024    EGFR 67 07/18/2024     Lab Results   Component Value Date    TRIG 205 (H) 05/28/2024    CHOL 278 (H) 05/28/2024    LDLCALC 169 (H) 05/28/2024    HDL 67.6 05/28/2024     Lab Results   Component Value Date    HGBA1C 4.9 07/18/2024       Current Outpatient Medications on File Prior to Visit   Medication Sig Dispense Refill    albuterol 2.5 mg /3 mL (0.083 %) nebulizer solution Take 3 mL (2.5 mg) by nebulization every 8 hours if needed.      albuterol 90 mcg/actuation inhaler Inhale 2 puffs every 6 hours if needed for wheezing. 34 g 3    ALPRAZolam XR (Xanax XR) 0.5 mg 24 hr tablet Take 1 tablet (0.5 mg) by mouth once daily. 90 tablet 0    calcium carbonate-vitamin D3 600 mg-20 mcg (800 unit) tablet Take 1 tablet by mouth 2 times a day.      cetirizine (ZyrTEC) 10 mg tablet Take 1 tablet (10 mg) by mouth once daily.      citalopram (CeleXA) 40 mg tablet Take 1 tablet (40 mg) by mouth once daily. 90 tablet 3    clobetasol (Temovate) 0.05 % cream APPLY TO AFFECTED AREA(S) TOPICALLY ONCE DAILY FOR 1 WEEK, THEN TWICE WEEKLY FOR MAINTENANCE.      clobetasol (Temovate) 0.05 % ointment Apply topically 2 times a week. 60 g 1    diclofenac sodium (Voltaren Arthritis Pain) 1 % gel Apply 4.5 inches (4 g) topically 2 times a day as needed.      estradiol (Estrace) 0.01 % (0.1 mg/gram) vaginal cream Insert 0.125 Applicatorfuls (0.5 g) into the vagina 3 times a week. 42.5 g 2    fluticasone (Flonase) 50 mcg/actuation nasal spray Administer 2 sprays into each nostril once daily. 48 g 3    furosemide (Lasix) 20 mg tablet Take 1 tablet (20  mg) by mouth once daily as needed (edema). 90 tablet 3    gabapentin (Neurontin) 300 mg capsule Take 1 capsule (300 mg) by mouth 2 times a day. 810 capsule 3    glucosamine-chondroit-vit C-Mn (Glucosamine Chondroitin MaxStr) 500-400 mg capsule Take 1,500 mg by mouth once daily.      guaiFENesin (Mucinex) 1,200 mg tablet extended release 12hr Take 1 tablet (1,200 mg) by mouth every 12 hours. FOR CONGESTION      ketoconazole (NIZOral) 2 % cream Apply topically twice a day.      krill/om3/dha/epa/om6/lip/astx (KRILL OIL, OMEGA 3 AND 6, ORAL) Take 1 capsule by mouth once daily.      levothyroxine (Synthroid, Levoxyl) 150 mcg tablet TAKE 1 TABLET BY MOUTH ONCE  DAILY 90 tablet 3    losartan (Cozaar) 50 mg tablet Take 1 tablet (50 mg) by mouth once daily. 90 tablet 3    magnesium oxide-Mg AA chelate (Magnesium, oxide/AA chelate,) 300 mg capsule Take 1 capsule (300 mg) by mouth once daily.      melatonin 3 mg tablet Take 10 mg by mouth once daily at bedtime.      montelukast (Singulair) 10 mg tablet Take 1 tablet (10 mg) by mouth once daily. as directed 90 tablet 3    multivitamin with minerals (multivit-min-iron fum-folic ac) tablet Take 1 tablet by mouth once daily.      nystatin (Mycostatin) 100,000 unit/gram powder APPLY TOPICALLY ONCE DAILY 180 g 2    omeprazole (PriLOSEC) 40 mg DR capsule TAKE 1 CAPSULE BY MOUTH TWICE  DAILY 180 capsule 3    simethicone (Mylicon) 80 mg chewable tablet Chew 1 tablet (80 mg) every 6 hours if needed.      triamterene-hydrochlorothiazid (Maxzide-25) 37.5-25 mg tablet Take 1 tablet by mouth once daily. 90 tablet 3    Wixela Inhub 250-50 mcg/dose diskus inhaler USE 1 INHALATION BY MOUTH EVERY  12 HOURS 180 each 3    zinc sulfate (Zincate) 220 (50 Zn) MG capsule Take 1 capsule (50 mg of elemental zinc) by mouth once daily.      [DISCONTINUED] semaglutide 0.25 mg or 0.5 mg (2 mg/3 mL) pen injector Inject 0.25 mg under the skin 1 (one) time per week. 6 mL 1    [DISCONTINUED] tirzepatide  (Mounjaro) 2.5 mg/0.5 mL pen injector Inject 2.5 mg under the skin 1 (one) time per week. 2 mL 0    [DISCONTINUED] tirzepatide (Mounjaro) 2.5 mg/0.5 mL pen injector Inject 2.5 mg under the skin 1 (one) time per week. 2 mL 0     No current facility-administered medications on file prior to visit.       PATIENT EDUCATION/GOALS  - Counseled patient on MOA, expectations, side effects, duration of therapy, contraindications, administration, and monitoring parameters  - Answered all patient questions and concerns; provided [facility] phone number if issues/questions arise    RECOMMENDATIONS/PLAN  Weight Loss Plan: Continue lifestyle modifications, diet and exercise.      Clinical Pharmacist follow up: as needed, patient provided pharmacist contact information with questions or concerns  PCP follow up: 8/25/25    Continue all meds under the continuation of care with the referring provider and clinical pharmacy team.    Stefanie Thacker, PharmD  Clinical Pharmacy Specialist, Primary Care   [phone]    Verbal consent to manage patient's drug therapy was obtained from patient. They were informed they may decline to participate or withdraw from participation in pharmacy services at any time.

## 2025-04-19 DIAGNOSIS — I10 BENIGN ESSENTIAL HYPERTENSION: ICD-10-CM

## 2025-04-25 RX ORDER — FUROSEMIDE 20 MG/1
20 TABLET ORAL DAILY PRN
Qty: 90 TABLET | Refills: 3 | Status: SHIPPED | OUTPATIENT
Start: 2025-04-25 | End: 2026-04-25

## 2025-06-04 DIAGNOSIS — F41.9 ANXIETY: ICD-10-CM

## 2025-06-04 RX ORDER — ALPRAZOLAM 0.5 MG/1
0.5 TABLET, EXTENDED RELEASE ORAL DAILY
Qty: 90 TABLET | Refills: 0 | Status: SHIPPED | OUTPATIENT
Start: 2025-06-04

## 2025-08-01 ENCOUNTER — APPOINTMENT (OUTPATIENT)
Dept: RADIOLOGY | Facility: HOSPITAL | Age: 75
End: 2025-08-01
Payer: MEDICARE

## 2025-08-06 ENCOUNTER — OFFICE VISIT (OUTPATIENT)
Dept: URGENT CARE | Age: 75
End: 2025-08-06
Payer: MEDICARE

## 2025-08-06 VITALS
BODY MASS INDEX: 42.87 KG/M2 | SYSTOLIC BLOOD PRESSURE: 127 MMHG | OXYGEN SATURATION: 97 % | RESPIRATION RATE: 18 BRPM | WEIGHT: 242 LBS | DIASTOLIC BLOOD PRESSURE: 59 MMHG | HEART RATE: 59 BPM | TEMPERATURE: 97.9 F

## 2025-08-06 DIAGNOSIS — J02.9 ACUTE PHARYNGITIS, UNSPECIFIED ETIOLOGY: ICD-10-CM

## 2025-08-06 DIAGNOSIS — J45.901 ASTHMA WITH ACUTE EXACERBATION, UNSPECIFIED ASTHMA SEVERITY, UNSPECIFIED WHETHER PERSISTENT (HHS-HCC): Primary | ICD-10-CM

## 2025-08-06 RX ORDER — AMOXICILLIN AND CLAVULANATE POTASSIUM 875; 125 MG/1; MG/1
875 TABLET, FILM COATED ORAL 2 TIMES DAILY
Qty: 20 TABLET | Refills: 0 | Status: SHIPPED | OUTPATIENT
Start: 2025-08-06

## 2025-08-06 RX ORDER — PREDNISONE 10 MG/1
TABLET ORAL
Qty: 21 TABLET | Refills: 0 | Status: SHIPPED | OUTPATIENT
Start: 2025-08-06 | End: 2025-08-12

## 2025-08-06 ASSESSMENT — ENCOUNTER SYMPTOMS
COUGH: 1
SINUS PAIN: 1
WHEEZING: 1
CONSTITUTIONAL NEGATIVE: 1

## 2025-08-06 ASSESSMENT — PATIENT HEALTH QUESTIONNAIRE - PHQ9
SUM OF ALL RESPONSES TO PHQ9 QUESTIONS 1 AND 2: 0
2. FEELING DOWN, DEPRESSED OR HOPELESS: NOT AT ALL
1. LITTLE INTEREST OR PLEASURE IN DOING THINGS: NOT AT ALL

## 2025-08-06 NOTE — PATIENT INSTRUCTIONS
Increase Fluid intake.  Gargle with warm salt water.   Either take 2 Tylenol as needed up to 3 times a day .     Use your Albuterol 4 times a day    If you did not improve in a week, follow up  Take Probiotics  for 2 weeks

## 2025-08-06 NOTE — PROGRESS NOTES
Subjective   Patient ID: Mary See is a 75 y.o. female. They present today with a chief complaint of Sore Throat (Sore throat, cough, pain in lower back rt side. Symptoms started Saturday evening. ).    History of Present Illness  HPI    Past Medical History  Allergies as of 08/06/2025 - Reviewed 08/06/2025   Allergen Reaction Noted    Ibuprofen Shortness of breath and Blurred vision 05/04/2022    Gluten Unknown 05/30/2023    Adhesive tape-silicones Itching 05/30/2023    Lactose GI Upset 05/09/2022    Miconazole Hives 05/30/2023    Soap Rash 05/30/2023       Prescriptions Prior to Admission[1]     Medical History[2]    Surgical History[3]     reports that she quit smoking about 47 years ago. Her smoking use included cigarettes. She started smoking about 54 years ago. She has a 7 pack-year smoking history. She has never been exposed to tobacco smoke. She has never used smokeless tobacco. She reports that she does not currently use alcohol. She reports that she does not use drugs.    Review of Systems  Review of Systems   Constitutional: Negative.    HENT:  Positive for sinus pain. Negative for congestion.    Respiratory:  Positive for cough and wheezing.                                   Objective    Vitals:    08/06/25 1137   BP: 127/59   Pulse: 59   Resp: 18   Temp: 36.6 °C (97.9 °F)   SpO2: 97%   Weight: 110 kg (242 lb)     No LMP recorded. Patient is postmenopausal.    Physical Exam  Constitutional:       Appearance: Normal appearance.   HENT:      Mouth/Throat:      Pharynx: Posterior oropharyngeal erythema present.     Eyes:      Conjunctiva/sclera: Conjunctivae normal.       Cardiovascular:      Rate and Rhythm: Normal rate and regular rhythm.   Pulmonary:      Effort: Pulmonary effort is normal.      Breath sounds: Normal breath sounds.     Neurological:      Mental Status: She is alert.         Procedures    Point of Care Test & Imaging Results from this visit  No results found for this visit on  08/06/25.   Imaging  No results found.    Cardiology, Vascular, and Other Imaging  No other imaging results found for the past 2 days      Diagnostic study results (if any) were reviewed by Norma Heath MD.    Assessment/Plan   Allergies, medications, history, and pertinent labs/EKGs/Imaging reviewed by Norma Heath MD.     Medical Decision Making  Asthma with exacerbation    And Pharyngitis, will be treated with Augmentin and Prednisone    Orders and Diagnoses  There are no diagnoses linked to this encounter.    Medical Admin Record      Patient disposition: Home    Electronically signed by Norma Heath MD  12:20 PM           [1] (Not in a hospital admission)  [2]   Past Medical History:  Diagnosis Date    Abnormal amount of sputum     Mucinex BID    Anemia     Ankle and foot deformity, acquired 03/16/2023    Anxiety     Asthma, moderate persistent (HHS-HCC)     controlled on Wixela    Carpal tunnel syndrome, bilateral upper limbs 05/18/2020    Severe carpal tunnel syndrome of both wrists    Depression     Fibromyalgia     Gastric ulcer     GERD (gastroesophageal reflux disease)     Hyperlipidemia     Hypertension     Hypothyroidism     IBS (irritable bowel syndrome)     Inadequate sleep hygiene 10/01/2018    Inadequate sleep hygiene    Morbid obesity (Multi)     OA (osteoarthritis)     PTSD (post-traumatic stress disorder)     PVC (premature ventricular contraction)     Remote PVCs, PACs    Scoliosis     Sleep apnea     CPAP setting 10-20, keeps HOB elevated 33 degrees    Spinal stenosis     Status post replacement of right shoulder joint 03/16/2023    Status post tracheostomy (Multi) 03/16/2023    Submandibular abscess 05/2021    Vocal cord dysfunction     w/ chronic upper airway cough and reccurent laryngospasm   [3]   Past Surgical History:  Procedure Laterality Date    ABSCESS DRAINAGE      submandibular    ANKLE SURGERY Right 1994    CARPAL TUNNEL RELEASE      CATARACT EXTRACTION Bilateral  10/01/2018     SECTION, CLASSIC  1987     Section    COLONOSCOPY  2019    HERNIA REPAIR      Inguinal Hernia Repair    HIP DEBRIDEMENT      s/p replacement    LASIK  2018    ROTATOR CUFF REPAIR Left     TONSILLECTOMY  89    Tonsillectomy    TOTAL HIP ARTHROPLASTY Right 2014    TOTAL KNEE ARTHROPLASTY      , 2016    TOTAL SHOULDER ARTHROPLASTY Right 2022    TRACHEOSTOMY CLOSURE      TRACHEOSTOMY TUBE PLACEMENT  2021

## 2025-08-18 ENCOUNTER — APPOINTMENT (OUTPATIENT)
Dept: PRIMARY CARE | Facility: CLINIC | Age: 75
End: 2025-08-18
Payer: MEDICARE

## 2025-08-18 VITALS
BODY MASS INDEX: 43.59 KG/M2 | SYSTOLIC BLOOD PRESSURE: 129 MMHG | OXYGEN SATURATION: 97 % | HEIGHT: 63 IN | HEART RATE: 61 BPM | TEMPERATURE: 96.4 F | WEIGHT: 246 LBS | DIASTOLIC BLOOD PRESSURE: 76 MMHG

## 2025-08-18 DIAGNOSIS — R10.11 RUQ PAIN: Primary | ICD-10-CM

## 2025-08-18 PROCEDURE — 1125F AMNT PAIN NOTED PAIN PRSNT: CPT | Performed by: STUDENT IN AN ORGANIZED HEALTH CARE EDUCATION/TRAINING PROGRAM

## 2025-08-18 PROCEDURE — G2211 COMPLEX E/M VISIT ADD ON: HCPCS | Performed by: STUDENT IN AN ORGANIZED HEALTH CARE EDUCATION/TRAINING PROGRAM

## 2025-08-18 PROCEDURE — 3074F SYST BP LT 130 MM HG: CPT | Performed by: STUDENT IN AN ORGANIZED HEALTH CARE EDUCATION/TRAINING PROGRAM

## 2025-08-18 PROCEDURE — 99214 OFFICE O/P EST MOD 30 MIN: CPT | Performed by: STUDENT IN AN ORGANIZED HEALTH CARE EDUCATION/TRAINING PROGRAM

## 2025-08-18 PROCEDURE — 3078F DIAST BP <80 MM HG: CPT | Performed by: STUDENT IN AN ORGANIZED HEALTH CARE EDUCATION/TRAINING PROGRAM

## 2025-08-18 ASSESSMENT — PAIN SCALES - GENERAL: PAINLEVEL_OUTOF10: 4

## 2025-08-19 ENCOUNTER — HOSPITAL ENCOUNTER (OUTPATIENT)
Dept: RADIOLOGY | Facility: CLINIC | Age: 75
Discharge: HOME | End: 2025-08-19
Payer: MEDICARE

## 2025-08-19 ENCOUNTER — APPOINTMENT (OUTPATIENT)
Dept: RADIOLOGY | Facility: CLINIC | Age: 75
End: 2025-08-19
Payer: MEDICARE

## 2025-08-19 DIAGNOSIS — R10.11 RUQ PAIN: ICD-10-CM

## 2025-08-19 LAB
ALBUMIN SERPL-MCNC: 4.5 G/DL (ref 3.6–5.1)
ALP SERPL-CCNC: 81 U/L (ref 37–153)
ALT SERPL-CCNC: 17 U/L (ref 6–29)
ANION GAP SERPL CALCULATED.4IONS-SCNC: 10 MMOL/L (CALC) (ref 7–17)
AST SERPL-CCNC: 19 U/L (ref 10–35)
BASOPHILS # BLD AUTO: 38 CELLS/UL (ref 0–200)
BASOPHILS NFR BLD AUTO: 0.5 %
BILIRUB SERPL-MCNC: 0.8 MG/DL (ref 0.2–1.2)
BUN SERPL-MCNC: 22 MG/DL (ref 7–25)
CALCIUM SERPL-MCNC: 9.2 MG/DL (ref 8.6–10.4)
CHLORIDE SERPL-SCNC: 99 MMOL/L (ref 98–110)
CO2 SERPL-SCNC: 30 MMOL/L (ref 20–32)
CREAT SERPL-MCNC: 1.03 MG/DL (ref 0.6–1)
EGFRCR SERPLBLD CKD-EPI 2021: 57 ML/MIN/1.73M2
EOSINOPHIL # BLD AUTO: 122 CELLS/UL (ref 15–500)
EOSINOPHIL NFR BLD AUTO: 1.6 %
ERYTHROCYTE [DISTWIDTH] IN BLOOD BY AUTOMATED COUNT: 12.6 % (ref 11–15)
GLUCOSE SERPL-MCNC: 81 MG/DL (ref 65–139)
HCT VFR BLD AUTO: 37.5 % (ref 35–45)
HGB BLD-MCNC: 12.2 G/DL (ref 11.7–15.5)
LYMPHOCYTES # BLD AUTO: 1680 CELLS/UL (ref 850–3900)
LYMPHOCYTES NFR BLD AUTO: 22.1 %
MCH RBC QN AUTO: 29.5 PG (ref 27–33)
MCHC RBC AUTO-ENTMCNC: 32.5 G/DL (ref 32–36)
MCV RBC AUTO: 90.8 FL (ref 80–100)
MONOCYTES # BLD AUTO: 540 CELLS/UL (ref 200–950)
MONOCYTES NFR BLD AUTO: 7.1 %
NEUTROPHILS # BLD AUTO: 5221 CELLS/UL (ref 1500–7800)
NEUTROPHILS NFR BLD AUTO: 68.7 %
PLATELET # BLD AUTO: 216 THOUSAND/UL (ref 140–400)
PMV BLD REES-ECKER: 10 FL (ref 7.5–12.5)
POTASSIUM SERPL-SCNC: 4 MMOL/L (ref 3.5–5.3)
PROT SERPL-MCNC: 6.8 G/DL (ref 6.1–8.1)
RBC # BLD AUTO: 4.13 MILLION/UL (ref 3.8–5.1)
SODIUM SERPL-SCNC: 139 MMOL/L (ref 135–146)
WBC # BLD AUTO: 7.6 THOUSAND/UL (ref 3.8–10.8)

## 2025-08-19 PROCEDURE — 76705 ECHO EXAM OF ABDOMEN: CPT

## 2025-08-19 PROCEDURE — 76705 ECHO EXAM OF ABDOMEN: CPT | Performed by: STUDENT IN AN ORGANIZED HEALTH CARE EDUCATION/TRAINING PROGRAM

## 2025-08-25 ENCOUNTER — APPOINTMENT (OUTPATIENT)
Dept: PRIMARY CARE | Facility: CLINIC | Age: 75
End: 2025-08-25
Payer: MEDICARE

## 2025-08-27 DIAGNOSIS — F41.9 ANXIETY: ICD-10-CM

## 2025-08-28 RX ORDER — ALPRAZOLAM 0.5 MG/1
0.5 TABLET, EXTENDED RELEASE ORAL DAILY
Qty: 90 TABLET | Refills: 0 | Status: SHIPPED | OUTPATIENT
Start: 2025-08-28

## 2025-08-29 ENCOUNTER — PATIENT MESSAGE (OUTPATIENT)
Dept: PRIMARY CARE | Facility: CLINIC | Age: 75
End: 2025-08-29
Payer: MEDICARE

## 2025-08-29 DIAGNOSIS — E78.00 HYPERCHOLESTEROLEMIA: ICD-10-CM

## 2025-08-29 DIAGNOSIS — I10 BENIGN ESSENTIAL HYPERTENSION: Primary | ICD-10-CM

## 2025-09-03 ENCOUNTER — HOSPITAL ENCOUNTER (OUTPATIENT)
Dept: RADIOLOGY | Facility: CLINIC | Age: 75
Discharge: HOME | End: 2025-09-03
Payer: MEDICARE

## 2025-09-03 DIAGNOSIS — I10 BENIGN ESSENTIAL HYPERTENSION: ICD-10-CM

## 2025-09-03 DIAGNOSIS — E78.00 HYPERCHOLESTEROLEMIA: ICD-10-CM

## 2025-09-03 PROCEDURE — 75571 CT HRT W/O DYE W/CA TEST: CPT

## 2025-09-09 ENCOUNTER — APPOINTMENT (OUTPATIENT)
Dept: ORTHOPEDIC SURGERY | Facility: HOSPITAL | Age: 75
End: 2025-09-09
Payer: MEDICARE

## 2025-09-29 ENCOUNTER — APPOINTMENT (OUTPATIENT)
Dept: OBSTETRICS AND GYNECOLOGY | Facility: CLINIC | Age: 75
End: 2025-09-29
Payer: MEDICARE

## 2026-02-25 ENCOUNTER — APPOINTMENT (OUTPATIENT)
Dept: DERMATOLOGY | Facility: CLINIC | Age: 76
End: 2026-02-25
Payer: MEDICARE

## (undated) DEVICE — DRAPE, INSTRUMENT, W/POUCH, STERI DRAPE, 7 X 11 IN, DISPOSABLE, STERILE

## (undated) DEVICE — ADHESIVE, SKIN, LIQUIBAND EXCEED

## (undated) DEVICE — SUTURE, PROLENE, 3-0, 18 IN, PS1, BLUE

## (undated) DEVICE — HIGH FLOW TIP FOR INTERPULSE HANDPIECE SET

## (undated) DEVICE — SUTURE, ETHIBOND XTRA, 5 V-37, GRN/BR, LF

## (undated) DEVICE — SUTURE, VICRYL, 0, 36 IN, CT-1, UNDYED

## (undated) DEVICE — DRAPE, SHEET, THREE QUARTER, FAN FOLD, 57 X 77 IN

## (undated) DEVICE — GLOVE, SURGICAL, PROTEXIS PI ORTHO, 8.5, PF, LF

## (undated) DEVICE — KIT, BEACH CHAIR TRIMANO

## (undated) DEVICE — NEEDLE, REVERSE CUTTING, W/NITINOL LOOP, C-13, 1/2 CIRCLE, 36.6MML

## (undated) DEVICE — SPONGE, LAP, XRAY DECT, 18IN X 18IN, W/MASTER DMT, STERILE

## (undated) DEVICE — K-WIRE, 2.4MM 9 IN

## (undated) DEVICE — TIP, SUCTION, YANKAUER, FLEXIBLE

## (undated) DEVICE — MIXER, CEMENT, MIXEVAC III HIGH VACUUM KIT, STERILE

## (undated) DEVICE — DRAPE, INCISE, ANTIMICROBIAL, IOBAN 2, LARGE, 17 X 23 IN, DISPOSABLE, STERILE

## (undated) DEVICE — INTERPULSE HANDPIECE SET W/ 10FT SUCTION TUBING

## (undated) DEVICE — COVER, BACK TABLE, 65 X 90, HVY REINFORCED

## (undated) DEVICE — PAD, GROUNDING, ELECTROSURGICAL, W/9 FT CABLE, POLYHESIVE II, ADULT, LF

## (undated) DEVICE — BLADE, SAW PFC DUAL CUT 25 X 90

## (undated) DEVICE — SUTURE TAPE, 1.3MM 40IN, BLK/WH, W/TAPER NDL 36.6MM

## (undated) DEVICE — Device

## (undated) DEVICE — SUTURE, VICRYL, 3-0, 27 IN, SH

## (undated) DEVICE — HOOD, STERISHIELD T4 SYSTEM

## (undated) DEVICE — DRAPE, SHEET, 17 X 23 IN

## (undated) DEVICE — RETRIEVER, SUTURE, HEWSON